# Patient Record
Sex: FEMALE | Race: WHITE | NOT HISPANIC OR LATINO | Employment: UNEMPLOYED | URBAN - METROPOLITAN AREA
[De-identification: names, ages, dates, MRNs, and addresses within clinical notes are randomized per-mention and may not be internally consistent; named-entity substitution may affect disease eponyms.]

---

## 2017-01-05 ENCOUNTER — AMBULATORY - HEALTHEAST (OUTPATIENT)
Dept: LAB | Facility: CLINIC | Age: 42
End: 2017-01-05

## 2017-01-05 ENCOUNTER — AMBULATORY - HEALTHEAST (OUTPATIENT)
Dept: BEHAVIORAL HEALTH | Facility: CLINIC | Age: 42
End: 2017-01-05

## 2017-01-05 ENCOUNTER — COMMUNICATION - HEALTHEAST (OUTPATIENT)
Dept: BEHAVIORAL HEALTH | Facility: CLINIC | Age: 42
End: 2017-01-05

## 2017-01-05 ENCOUNTER — OFFICE VISIT - HEALTHEAST (OUTPATIENT)
Dept: BEHAVIORAL HEALTH | Facility: CLINIC | Age: 42
End: 2017-01-05

## 2017-01-05 DIAGNOSIS — F43.10 PTSD (POST-TRAUMATIC STRESS DISORDER): ICD-10-CM

## 2017-01-05 DIAGNOSIS — F10.20 ALCOHOL DEPENDENCE, CONTINUOUS (H): ICD-10-CM

## 2017-01-05 DIAGNOSIS — F41.1 GAD (GENERALIZED ANXIETY DISORDER): ICD-10-CM

## 2017-01-05 DIAGNOSIS — F14.20 COCAINE DEPENDENCE, CONTINUOUS (H): ICD-10-CM

## 2017-01-05 DIAGNOSIS — F31.31 BIPOLAR 1 DISORDER, DEPRESSED, MILD (H): ICD-10-CM

## 2017-01-05 DIAGNOSIS — Z79.899 ENCOUNTER FOR LONG-TERM (CURRENT) USE OF MEDICATIONS: ICD-10-CM

## 2017-01-05 DIAGNOSIS — F10.21 ALCOHOL DEPENDENCE IN REMISSION (H): ICD-10-CM

## 2017-01-05 DIAGNOSIS — F14.21: ICD-10-CM

## 2017-01-09 ENCOUNTER — AMBULATORY - HEALTHEAST (OUTPATIENT)
Dept: LAB | Facility: CLINIC | Age: 42
End: 2017-01-09

## 2017-01-09 ENCOUNTER — COMMUNICATION - HEALTHEAST (OUTPATIENT)
Dept: BEHAVIORAL HEALTH | Facility: CLINIC | Age: 42
End: 2017-01-09

## 2017-01-09 DIAGNOSIS — F10.21 ALCOHOL DEPENDENCE IN REMISSION (H): ICD-10-CM

## 2017-01-09 DIAGNOSIS — F14.21: ICD-10-CM

## 2017-01-11 ENCOUNTER — OFFICE VISIT - HEALTHEAST (OUTPATIENT)
Dept: ADDICTION MEDICINE | Facility: CLINIC | Age: 42
End: 2017-01-11

## 2017-01-11 DIAGNOSIS — F14.20 COCAINE USE DISORDER, SEVERE, DEPENDENCE (H): ICD-10-CM

## 2017-01-16 ENCOUNTER — COMMUNICATION - HEALTHEAST (OUTPATIENT)
Dept: BEHAVIORAL HEALTH | Facility: CLINIC | Age: 42
End: 2017-01-16

## 2017-01-16 ENCOUNTER — OFFICE VISIT - HEALTHEAST (OUTPATIENT)
Dept: BEHAVIORAL HEALTH | Facility: CLINIC | Age: 42
End: 2017-01-16

## 2017-01-16 DIAGNOSIS — F10.21 ALCOHOL DEPENDENCE IN REMISSION (H): ICD-10-CM

## 2017-01-16 DIAGNOSIS — F43.10 PTSD (POST-TRAUMATIC STRESS DISORDER): ICD-10-CM

## 2017-01-16 DIAGNOSIS — F14.21: ICD-10-CM

## 2017-01-16 DIAGNOSIS — F41.1 GAD (GENERALIZED ANXIETY DISORDER): ICD-10-CM

## 2017-01-16 DIAGNOSIS — F31.31 BIPOLAR 1 DISORDER, DEPRESSED, MILD (H): ICD-10-CM

## 2017-01-18 ENCOUNTER — COMMUNICATION - HEALTHEAST (OUTPATIENT)
Dept: ADDICTION MEDICINE | Facility: CLINIC | Age: 42
End: 2017-01-18

## 2017-01-19 ENCOUNTER — OFFICE VISIT - HEALTHEAST (OUTPATIENT)
Dept: BEHAVIORAL HEALTH | Facility: CLINIC | Age: 42
End: 2017-01-19

## 2017-01-19 DIAGNOSIS — F31.31 BIPOLAR 1 DISORDER, DEPRESSED, MILD (H): ICD-10-CM

## 2017-01-19 DIAGNOSIS — F14.21: ICD-10-CM

## 2017-01-19 DIAGNOSIS — F43.10 PTSD (POST-TRAUMATIC STRESS DISORDER): ICD-10-CM

## 2017-01-23 ENCOUNTER — OFFICE VISIT - HEALTHEAST (OUTPATIENT)
Dept: BEHAVIORAL HEALTH | Facility: CLINIC | Age: 42
End: 2017-01-23

## 2017-01-23 ENCOUNTER — COMMUNICATION - HEALTHEAST (OUTPATIENT)
Dept: BEHAVIORAL HEALTH | Facility: CLINIC | Age: 42
End: 2017-01-23

## 2017-01-23 ENCOUNTER — AMBULATORY - HEALTHEAST (OUTPATIENT)
Dept: LAB | Facility: CLINIC | Age: 42
End: 2017-01-23

## 2017-01-23 DIAGNOSIS — F43.10 PTSD (POST-TRAUMATIC STRESS DISORDER): ICD-10-CM

## 2017-01-23 DIAGNOSIS — F31.31 BIPOLAR 1 DISORDER, DEPRESSED, MILD (H): ICD-10-CM

## 2017-01-23 DIAGNOSIS — F10.21 ALCOHOL DEPENDENCE IN REMISSION (H): ICD-10-CM

## 2017-01-23 DIAGNOSIS — F41.1 GAD (GENERALIZED ANXIETY DISORDER): ICD-10-CM

## 2017-01-23 DIAGNOSIS — F14.21: ICD-10-CM

## 2017-01-24 ENCOUNTER — OFFICE VISIT - HEALTHEAST (OUTPATIENT)
Dept: ADDICTION MEDICINE | Facility: CLINIC | Age: 42
End: 2017-01-24

## 2017-01-24 DIAGNOSIS — F14.20 COCAINE USE DISORDER, SEVERE, DEPENDENCE (H): ICD-10-CM

## 2017-01-25 ENCOUNTER — OFFICE VISIT - HEALTHEAST (OUTPATIENT)
Dept: ADDICTION MEDICINE | Facility: CLINIC | Age: 42
End: 2017-01-25

## 2017-01-25 ENCOUNTER — COMMUNICATION - HEALTHEAST (OUTPATIENT)
Dept: BEHAVIORAL HEALTH | Facility: CLINIC | Age: 42
End: 2017-01-25

## 2017-01-25 DIAGNOSIS — F14.20 COCAINE USE DISORDER, SEVERE, DEPENDENCE (H): ICD-10-CM

## 2017-01-27 ENCOUNTER — OFFICE VISIT - HEALTHEAST (OUTPATIENT)
Dept: ADDICTION MEDICINE | Facility: CLINIC | Age: 42
End: 2017-01-27

## 2017-01-27 ENCOUNTER — AMBULATORY - HEALTHEAST (OUTPATIENT)
Dept: ADDICTION MEDICINE | Facility: CLINIC | Age: 42
End: 2017-01-27

## 2017-01-27 DIAGNOSIS — F14.20 COCAINE USE DISORDER, SEVERE, DEPENDENCE (H): ICD-10-CM

## 2017-01-30 ENCOUNTER — AMBULATORY - HEALTHEAST (OUTPATIENT)
Dept: LAB | Facility: CLINIC | Age: 42
End: 2017-01-30

## 2017-01-30 ENCOUNTER — OFFICE VISIT - HEALTHEAST (OUTPATIENT)
Dept: ADDICTION MEDICINE | Facility: CLINIC | Age: 42
End: 2017-01-30

## 2017-01-30 DIAGNOSIS — F14.20 COCAINE USE DISORDER, SEVERE, DEPENDENCE (H): ICD-10-CM

## 2017-01-30 DIAGNOSIS — F10.21 ALCOHOL DEPENDENCE IN REMISSION (H): ICD-10-CM

## 2017-01-30 DIAGNOSIS — F14.21: ICD-10-CM

## 2017-01-31 ENCOUNTER — COMMUNICATION - HEALTHEAST (OUTPATIENT)
Dept: BEHAVIORAL HEALTH | Facility: CLINIC | Age: 42
End: 2017-01-31

## 2017-02-01 ENCOUNTER — OFFICE VISIT - HEALTHEAST (OUTPATIENT)
Dept: ADDICTION MEDICINE | Facility: CLINIC | Age: 42
End: 2017-02-01

## 2017-02-01 DIAGNOSIS — F14.20 COCAINE USE DISORDER, SEVERE, DEPENDENCE (H): ICD-10-CM

## 2017-02-02 ENCOUNTER — AMBULATORY - HEALTHEAST (OUTPATIENT)
Dept: ADDICTION MEDICINE | Facility: CLINIC | Age: 42
End: 2017-02-02

## 2017-02-02 ENCOUNTER — OFFICE VISIT - HEALTHEAST (OUTPATIENT)
Dept: BEHAVIORAL HEALTH | Facility: CLINIC | Age: 42
End: 2017-02-02

## 2017-02-02 DIAGNOSIS — F41.1 GAD (GENERALIZED ANXIETY DISORDER): ICD-10-CM

## 2017-02-02 DIAGNOSIS — F14.21: ICD-10-CM

## 2017-02-02 DIAGNOSIS — F31.31 BIPOLAR 1 DISORDER, DEPRESSED, MILD (H): ICD-10-CM

## 2017-02-02 DIAGNOSIS — F43.10 PTSD (POST-TRAUMATIC STRESS DISORDER): ICD-10-CM

## 2017-02-03 ENCOUNTER — AMBULATORY - HEALTHEAST (OUTPATIENT)
Dept: ADDICTION MEDICINE | Facility: CLINIC | Age: 42
End: 2017-02-03

## 2017-02-03 ENCOUNTER — OFFICE VISIT - HEALTHEAST (OUTPATIENT)
Dept: ADDICTION MEDICINE | Facility: CLINIC | Age: 42
End: 2017-02-03

## 2017-02-03 DIAGNOSIS — F14.20 COCAINE USE DISORDER, SEVERE, DEPENDENCE (H): ICD-10-CM

## 2017-02-06 ENCOUNTER — COMMUNICATION - HEALTHEAST (OUTPATIENT)
Dept: BEHAVIORAL HEALTH | Facility: CLINIC | Age: 42
End: 2017-02-06

## 2017-02-06 ENCOUNTER — OFFICE VISIT - HEALTHEAST (OUTPATIENT)
Dept: ADDICTION MEDICINE | Facility: CLINIC | Age: 42
End: 2017-02-06

## 2017-02-06 ENCOUNTER — OFFICE VISIT - HEALTHEAST (OUTPATIENT)
Dept: BEHAVIORAL HEALTH | Facility: CLINIC | Age: 42
End: 2017-02-06

## 2017-02-06 ENCOUNTER — AMBULATORY - HEALTHEAST (OUTPATIENT)
Dept: LAB | Facility: CLINIC | Age: 42
End: 2017-02-06

## 2017-02-06 DIAGNOSIS — F14.21: ICD-10-CM

## 2017-02-06 DIAGNOSIS — F10.21 ALCOHOL DEPENDENCE IN REMISSION (H): ICD-10-CM

## 2017-02-06 DIAGNOSIS — F43.10 PTSD (POST-TRAUMATIC STRESS DISORDER): ICD-10-CM

## 2017-02-06 DIAGNOSIS — F14.20 COCAINE USE DISORDER, SEVERE, DEPENDENCE (H): ICD-10-CM

## 2017-02-06 DIAGNOSIS — F41.1 GAD (GENERALIZED ANXIETY DISORDER): ICD-10-CM

## 2017-02-06 DIAGNOSIS — F31.31 BIPOLAR 1 DISORDER, DEPRESSED, MILD (H): ICD-10-CM

## 2017-02-10 ENCOUNTER — OFFICE VISIT - HEALTHEAST (OUTPATIENT)
Dept: ADDICTION MEDICINE | Facility: CLINIC | Age: 42
End: 2017-02-10

## 2017-02-10 DIAGNOSIS — F14.20 COCAINE USE DISORDER, SEVERE, DEPENDENCE (H): ICD-10-CM

## 2017-02-13 ENCOUNTER — OFFICE VISIT - HEALTHEAST (OUTPATIENT)
Dept: ADDICTION MEDICINE | Facility: CLINIC | Age: 42
End: 2017-02-13

## 2017-02-13 ENCOUNTER — OFFICE VISIT - HEALTHEAST (OUTPATIENT)
Dept: BEHAVIORAL HEALTH | Facility: CLINIC | Age: 42
End: 2017-02-13

## 2017-02-13 ENCOUNTER — AMBULATORY - HEALTHEAST (OUTPATIENT)
Dept: LAB | Facility: CLINIC | Age: 42
End: 2017-02-13

## 2017-02-13 DIAGNOSIS — F10.21 ALCOHOL DEPENDENCE IN REMISSION (H): ICD-10-CM

## 2017-02-13 DIAGNOSIS — F14.21: ICD-10-CM

## 2017-02-13 DIAGNOSIS — F43.10 PTSD (POST-TRAUMATIC STRESS DISORDER): ICD-10-CM

## 2017-02-13 DIAGNOSIS — F14.20 COCAINE USE DISORDER, SEVERE, DEPENDENCE (H): ICD-10-CM

## 2017-02-13 DIAGNOSIS — F41.1 GAD (GENERALIZED ANXIETY DISORDER): ICD-10-CM

## 2017-02-13 DIAGNOSIS — F31.31 BIPOLAR 1 DISORDER, DEPRESSED, MILD (H): ICD-10-CM

## 2017-02-14 ENCOUNTER — COMMUNICATION - HEALTHEAST (OUTPATIENT)
Dept: BEHAVIORAL HEALTH | Facility: CLINIC | Age: 42
End: 2017-02-14

## 2017-02-14 ENCOUNTER — OFFICE VISIT - HEALTHEAST (OUTPATIENT)
Dept: ADDICTION MEDICINE | Facility: CLINIC | Age: 42
End: 2017-02-14

## 2017-02-14 DIAGNOSIS — F14.20 COCAINE USE DISORDER, SEVERE, DEPENDENCE (H): ICD-10-CM

## 2017-02-15 ENCOUNTER — OFFICE VISIT - HEALTHEAST (OUTPATIENT)
Dept: ADDICTION MEDICINE | Facility: CLINIC | Age: 42
End: 2017-02-15

## 2017-02-15 DIAGNOSIS — F14.20 COCAINE USE DISORDER, SEVERE, DEPENDENCE (H): ICD-10-CM

## 2017-02-16 ENCOUNTER — OFFICE VISIT - HEALTHEAST (OUTPATIENT)
Dept: BEHAVIORAL HEALTH | Facility: CLINIC | Age: 42
End: 2017-02-16

## 2017-02-16 ENCOUNTER — AMBULATORY - HEALTHEAST (OUTPATIENT)
Dept: ADDICTION MEDICINE | Facility: CLINIC | Age: 42
End: 2017-02-16

## 2017-02-16 DIAGNOSIS — F14.21: ICD-10-CM

## 2017-02-16 DIAGNOSIS — F41.1 GAD (GENERALIZED ANXIETY DISORDER): ICD-10-CM

## 2017-02-16 DIAGNOSIS — F43.10 PTSD (POST-TRAUMATIC STRESS DISORDER): ICD-10-CM

## 2017-02-16 DIAGNOSIS — F31.31 BIPOLAR 1 DISORDER, DEPRESSED, MILD (H): ICD-10-CM

## 2017-02-17 ENCOUNTER — OFFICE VISIT - HEALTHEAST (OUTPATIENT)
Dept: ADDICTION MEDICINE | Facility: CLINIC | Age: 42
End: 2017-02-17

## 2017-02-17 DIAGNOSIS — F14.20 COCAINE USE DISORDER, SEVERE, DEPENDENCE (H): ICD-10-CM

## 2017-02-20 ENCOUNTER — AMBULATORY - HEALTHEAST (OUTPATIENT)
Dept: LAB | Facility: CLINIC | Age: 42
End: 2017-02-20

## 2017-02-20 ENCOUNTER — OFFICE VISIT - HEALTHEAST (OUTPATIENT)
Dept: ADDICTION MEDICINE | Facility: CLINIC | Age: 42
End: 2017-02-20

## 2017-02-20 DIAGNOSIS — F14.20 COCAINE USE DISORDER, SEVERE, DEPENDENCE (H): ICD-10-CM

## 2017-02-20 DIAGNOSIS — F14.21: ICD-10-CM

## 2017-02-20 DIAGNOSIS — F10.21 ALCOHOL DEPENDENCE IN REMISSION (H): ICD-10-CM

## 2017-02-21 ENCOUNTER — COMMUNICATION - HEALTHEAST (OUTPATIENT)
Dept: BEHAVIORAL HEALTH | Facility: CLINIC | Age: 42
End: 2017-02-21

## 2017-02-21 ENCOUNTER — OFFICE VISIT - HEALTHEAST (OUTPATIENT)
Dept: ADDICTION MEDICINE | Facility: CLINIC | Age: 42
End: 2017-02-21

## 2017-02-21 DIAGNOSIS — F14.20 COCAINE USE DISORDER, SEVERE, DEPENDENCE (H): ICD-10-CM

## 2017-02-22 ENCOUNTER — OFFICE VISIT - HEALTHEAST (OUTPATIENT)
Dept: ADDICTION MEDICINE | Facility: CLINIC | Age: 42
End: 2017-02-22

## 2017-02-22 ENCOUNTER — AMBULATORY - HEALTHEAST (OUTPATIENT)
Dept: ADDICTION MEDICINE | Facility: CLINIC | Age: 42
End: 2017-02-22

## 2017-02-22 DIAGNOSIS — F14.20 COCAINE USE DISORDER, SEVERE, DEPENDENCE (H): ICD-10-CM

## 2017-02-24 ENCOUNTER — OFFICE VISIT - HEALTHEAST (OUTPATIENT)
Dept: ADDICTION MEDICINE | Facility: CLINIC | Age: 42
End: 2017-02-24

## 2017-02-24 DIAGNOSIS — F14.20 COCAINE USE DISORDER, SEVERE, DEPENDENCE (H): ICD-10-CM

## 2017-02-27 ENCOUNTER — AMBULATORY - HEALTHEAST (OUTPATIENT)
Dept: LAB | Facility: CLINIC | Age: 42
End: 2017-02-27

## 2017-02-27 ENCOUNTER — COMMUNICATION - HEALTHEAST (OUTPATIENT)
Dept: BEHAVIORAL HEALTH | Facility: CLINIC | Age: 42
End: 2017-02-27

## 2017-02-27 DIAGNOSIS — F14.21: ICD-10-CM

## 2017-02-27 DIAGNOSIS — F10.21 ALCOHOL DEPENDENCE IN REMISSION (H): ICD-10-CM

## 2017-02-28 ENCOUNTER — OFFICE VISIT - HEALTHEAST (OUTPATIENT)
Dept: ADDICTION MEDICINE | Facility: CLINIC | Age: 42
End: 2017-02-28

## 2017-02-28 ENCOUNTER — COMMUNICATION - HEALTHEAST (OUTPATIENT)
Dept: BEHAVIORAL HEALTH | Facility: CLINIC | Age: 42
End: 2017-02-28

## 2017-02-28 DIAGNOSIS — F14.20 COCAINE USE DISORDER, SEVERE, DEPENDENCE (H): ICD-10-CM

## 2017-03-01 ENCOUNTER — OFFICE VISIT - HEALTHEAST (OUTPATIENT)
Dept: ADDICTION MEDICINE | Facility: CLINIC | Age: 42
End: 2017-03-01

## 2017-03-01 DIAGNOSIS — F14.20 COCAINE USE DISORDER, SEVERE, DEPENDENCE (H): ICD-10-CM

## 2017-03-03 ENCOUNTER — OFFICE VISIT - HEALTHEAST (OUTPATIENT)
Dept: ADDICTION MEDICINE | Facility: CLINIC | Age: 42
End: 2017-03-03

## 2017-03-03 DIAGNOSIS — F14.20 COCAINE USE DISORDER, SEVERE, DEPENDENCE (H): ICD-10-CM

## 2017-03-06 ENCOUNTER — OFFICE VISIT - HEALTHEAST (OUTPATIENT)
Dept: ADDICTION MEDICINE | Facility: CLINIC | Age: 42
End: 2017-03-06

## 2017-03-06 DIAGNOSIS — F14.20 COCAINE USE DISORDER, SEVERE, DEPENDENCE (H): ICD-10-CM

## 2017-03-07 ENCOUNTER — AMBULATORY - HEALTHEAST (OUTPATIENT)
Dept: ADDICTION MEDICINE | Facility: CLINIC | Age: 42
End: 2017-03-07

## 2017-03-07 ENCOUNTER — OFFICE VISIT - HEALTHEAST (OUTPATIENT)
Dept: ADDICTION MEDICINE | Facility: CLINIC | Age: 42
End: 2017-03-07

## 2017-03-07 ENCOUNTER — OFFICE VISIT - HEALTHEAST (OUTPATIENT)
Dept: BEHAVIORAL HEALTH | Facility: CLINIC | Age: 42
End: 2017-03-07

## 2017-03-07 DIAGNOSIS — F14.20 COCAINE USE DISORDER, SEVERE, DEPENDENCE (H): ICD-10-CM

## 2017-03-07 DIAGNOSIS — F10.21 ALCOHOL DEPENDENCE IN REMISSION (H): ICD-10-CM

## 2017-03-07 DIAGNOSIS — F31.32 MODERATE DEPRESSED BIPOLAR I DISORDER (H): ICD-10-CM

## 2017-03-07 DIAGNOSIS — F14.21 COCAINE DEPENDENCE, IN REMISSION (H): ICD-10-CM

## 2017-03-07 DIAGNOSIS — F43.10 PTSD (POST-TRAUMATIC STRESS DISORDER): ICD-10-CM

## 2017-03-07 DIAGNOSIS — F12.20 MARIJUANA DEPENDENCE (H): ICD-10-CM

## 2017-03-07 DIAGNOSIS — F31.31 MILD DEPRESSED BIPOLAR I DISORDER (H): ICD-10-CM

## 2017-03-07 DIAGNOSIS — F17.200 NICOTINE ADDICTION: ICD-10-CM

## 2017-03-07 DIAGNOSIS — F41.1 GAD (GENERALIZED ANXIETY DISORDER): ICD-10-CM

## 2017-03-07 ASSESSMENT — MIFFLIN-ST. JEOR: SCORE: 1448.75

## 2017-03-08 ENCOUNTER — OFFICE VISIT - HEALTHEAST (OUTPATIENT)
Dept: ADDICTION MEDICINE | Facility: CLINIC | Age: 42
End: 2017-03-08

## 2017-03-08 DIAGNOSIS — F14.20 COCAINE USE DISORDER, SEVERE, DEPENDENCE (H): ICD-10-CM

## 2017-03-10 ENCOUNTER — OFFICE VISIT - HEALTHEAST (OUTPATIENT)
Dept: ADDICTION MEDICINE | Facility: CLINIC | Age: 42
End: 2017-03-10

## 2017-03-10 DIAGNOSIS — F14.20 COCAINE USE DISORDER, SEVERE, DEPENDENCE (H): ICD-10-CM

## 2017-03-15 ENCOUNTER — OFFICE VISIT - HEALTHEAST (OUTPATIENT)
Dept: ADDICTION MEDICINE | Facility: CLINIC | Age: 42
End: 2017-03-15

## 2017-03-15 DIAGNOSIS — F14.20 COCAINE USE DISORDER, SEVERE, DEPENDENCE (H): ICD-10-CM

## 2017-03-16 ENCOUNTER — AMBULATORY - HEALTHEAST (OUTPATIENT)
Dept: LAB | Facility: CLINIC | Age: 42
End: 2017-03-16

## 2017-03-16 DIAGNOSIS — F10.21 ALCOHOL DEPENDENCE IN REMISSION (H): ICD-10-CM

## 2017-03-16 DIAGNOSIS — F14.21: ICD-10-CM

## 2017-03-17 ENCOUNTER — COMMUNICATION - HEALTHEAST (OUTPATIENT)
Dept: BEHAVIORAL HEALTH | Facility: CLINIC | Age: 42
End: 2017-03-17

## 2017-03-17 ENCOUNTER — OFFICE VISIT - HEALTHEAST (OUTPATIENT)
Dept: ADDICTION MEDICINE | Facility: CLINIC | Age: 42
End: 2017-03-17

## 2017-03-17 DIAGNOSIS — F14.20 COCAINE USE DISORDER, SEVERE, DEPENDENCE (H): ICD-10-CM

## 2017-03-20 ENCOUNTER — AMBULATORY - HEALTHEAST (OUTPATIENT)
Dept: ADDICTION MEDICINE | Facility: CLINIC | Age: 42
End: 2017-03-20

## 2017-03-22 ENCOUNTER — OFFICE VISIT - HEALTHEAST (OUTPATIENT)
Dept: ADDICTION MEDICINE | Facility: CLINIC | Age: 42
End: 2017-03-22

## 2017-03-22 DIAGNOSIS — F14.20 COCAINE USE DISORDER, SEVERE, DEPENDENCE (H): ICD-10-CM

## 2017-03-23 ENCOUNTER — OFFICE VISIT - HEALTHEAST (OUTPATIENT)
Dept: BEHAVIORAL HEALTH | Facility: CLINIC | Age: 42
End: 2017-03-23

## 2017-03-23 DIAGNOSIS — F31.31 MILD DEPRESSED BIPOLAR I DISORDER (H): ICD-10-CM

## 2017-03-23 DIAGNOSIS — F14.21 COCAINE DEPENDENCE, IN REMISSION (H): ICD-10-CM

## 2017-03-23 DIAGNOSIS — F43.10 PTSD (POST-TRAUMATIC STRESS DISORDER): ICD-10-CM

## 2017-03-23 DIAGNOSIS — F41.1 GAD (GENERALIZED ANXIETY DISORDER): ICD-10-CM

## 2017-03-24 ENCOUNTER — OFFICE VISIT - HEALTHEAST (OUTPATIENT)
Dept: ADDICTION MEDICINE | Facility: CLINIC | Age: 42
End: 2017-03-24

## 2017-03-24 DIAGNOSIS — F14.20 COCAINE USE DISORDER, SEVERE, DEPENDENCE (H): ICD-10-CM

## 2017-03-29 ENCOUNTER — OFFICE VISIT - HEALTHEAST (OUTPATIENT)
Dept: ADDICTION MEDICINE | Facility: CLINIC | Age: 42
End: 2017-03-29

## 2017-03-29 DIAGNOSIS — F14.20 COCAINE USE DISORDER, SEVERE, DEPENDENCE (H): ICD-10-CM

## 2017-03-30 ENCOUNTER — AMBULATORY - HEALTHEAST (OUTPATIENT)
Dept: ADDICTION MEDICINE | Facility: CLINIC | Age: 42
End: 2017-03-30

## 2017-03-31 ENCOUNTER — OFFICE VISIT - HEALTHEAST (OUTPATIENT)
Dept: ADDICTION MEDICINE | Facility: CLINIC | Age: 42
End: 2017-03-31

## 2017-03-31 DIAGNOSIS — F14.20 COCAINE USE DISORDER, SEVERE, DEPENDENCE (H): ICD-10-CM

## 2017-04-05 ENCOUNTER — OFFICE VISIT - HEALTHEAST (OUTPATIENT)
Dept: ADDICTION MEDICINE | Facility: CLINIC | Age: 42
End: 2017-04-05

## 2017-04-05 DIAGNOSIS — F14.20 COCAINE USE DISORDER, SEVERE, DEPENDENCE (H): ICD-10-CM

## 2017-04-07 ENCOUNTER — OFFICE VISIT - HEALTHEAST (OUTPATIENT)
Dept: ADDICTION MEDICINE | Facility: CLINIC | Age: 42
End: 2017-04-07

## 2017-04-07 DIAGNOSIS — F14.20 COCAINE USE DISORDER, SEVERE, DEPENDENCE (H): ICD-10-CM

## 2017-04-10 ENCOUNTER — AMBULATORY - HEALTHEAST (OUTPATIENT)
Dept: ADDICTION MEDICINE | Facility: CLINIC | Age: 42
End: 2017-04-10

## 2017-04-12 ENCOUNTER — OFFICE VISIT - HEALTHEAST (OUTPATIENT)
Dept: ADDICTION MEDICINE | Facility: CLINIC | Age: 42
End: 2017-04-12

## 2017-04-12 DIAGNOSIS — F14.20 COCAINE USE DISORDER, SEVERE, DEPENDENCE (H): ICD-10-CM

## 2017-04-13 ENCOUNTER — OFFICE VISIT - HEALTHEAST (OUTPATIENT)
Dept: BEHAVIORAL HEALTH | Facility: CLINIC | Age: 42
End: 2017-04-13

## 2017-04-13 DIAGNOSIS — F31.31 MILD DEPRESSED BIPOLAR I DISORDER (H): ICD-10-CM

## 2017-04-13 DIAGNOSIS — F14.21 COCAINE DEPENDENCE, IN REMISSION (H): ICD-10-CM

## 2017-04-13 DIAGNOSIS — F41.1 GAD (GENERALIZED ANXIETY DISORDER): ICD-10-CM

## 2017-04-13 DIAGNOSIS — F43.10 PTSD (POST-TRAUMATIC STRESS DISORDER): ICD-10-CM

## 2017-04-14 ENCOUNTER — OFFICE VISIT - HEALTHEAST (OUTPATIENT)
Dept: ADDICTION MEDICINE | Facility: CLINIC | Age: 42
End: 2017-04-14

## 2017-04-14 DIAGNOSIS — F14.20 COCAINE USE DISORDER, SEVERE, DEPENDENCE (H): ICD-10-CM

## 2017-04-19 ENCOUNTER — OFFICE VISIT - HEALTHEAST (OUTPATIENT)
Dept: ADDICTION MEDICINE | Facility: CLINIC | Age: 42
End: 2017-04-19

## 2017-04-19 ENCOUNTER — AMBULATORY - HEALTHEAST (OUTPATIENT)
Dept: ADDICTION MEDICINE | Facility: CLINIC | Age: 42
End: 2017-04-19

## 2017-04-19 DIAGNOSIS — F14.20 COCAINE USE DISORDER, SEVERE, DEPENDENCE (H): ICD-10-CM

## 2017-04-21 ENCOUNTER — OFFICE VISIT - HEALTHEAST (OUTPATIENT)
Dept: ADDICTION MEDICINE | Facility: CLINIC | Age: 42
End: 2017-04-21

## 2017-04-21 DIAGNOSIS — F14.20 COCAINE USE DISORDER, SEVERE, DEPENDENCE (H): ICD-10-CM

## 2017-04-24 ENCOUNTER — AMBULATORY - HEALTHEAST (OUTPATIENT)
Dept: ADDICTION MEDICINE | Facility: CLINIC | Age: 42
End: 2017-04-24

## 2017-04-25 ENCOUNTER — OFFICE VISIT - HEALTHEAST (OUTPATIENT)
Dept: ADDICTION MEDICINE | Facility: CLINIC | Age: 42
End: 2017-04-25

## 2017-04-25 DIAGNOSIS — F14.20 COCAINE USE DISORDER, SEVERE, DEPENDENCE (H): ICD-10-CM

## 2017-04-26 ENCOUNTER — COMMUNICATION - HEALTHEAST (OUTPATIENT)
Dept: BEHAVIORAL HEALTH | Facility: CLINIC | Age: 42
End: 2017-04-26

## 2017-04-26 ENCOUNTER — AMBULATORY - HEALTHEAST (OUTPATIENT)
Dept: ADDICTION MEDICINE | Facility: CLINIC | Age: 42
End: 2017-04-26

## 2017-04-26 DIAGNOSIS — F31.32 MODERATE DEPRESSED BIPOLAR I DISORDER (H): ICD-10-CM

## 2017-04-27 ENCOUNTER — COMMUNICATION - HEALTHEAST (OUTPATIENT)
Dept: BEHAVIORAL HEALTH | Facility: CLINIC | Age: 42
End: 2017-04-27

## 2017-04-27 DIAGNOSIS — F31.32 MODERATE DEPRESSED BIPOLAR I DISORDER (H): ICD-10-CM

## 2017-04-28 ENCOUNTER — AMBULATORY - HEALTHEAST (OUTPATIENT)
Dept: ADDICTION MEDICINE | Facility: CLINIC | Age: 42
End: 2017-04-28

## 2017-05-02 ENCOUNTER — OFFICE VISIT - HEALTHEAST (OUTPATIENT)
Dept: ADDICTION MEDICINE | Facility: CLINIC | Age: 42
End: 2017-05-02

## 2017-05-02 DIAGNOSIS — F14.20 COCAINE USE DISORDER, SEVERE, DEPENDENCE (H): ICD-10-CM

## 2017-05-03 ENCOUNTER — AMBULATORY - HEALTHEAST (OUTPATIENT)
Dept: ADDICTION MEDICINE | Facility: CLINIC | Age: 42
End: 2017-05-03

## 2017-05-04 ENCOUNTER — OFFICE VISIT - HEALTHEAST (OUTPATIENT)
Dept: BEHAVIORAL HEALTH | Facility: CLINIC | Age: 42
End: 2017-05-04

## 2017-05-04 DIAGNOSIS — F14.21 COCAINE DEPENDENCE, IN REMISSION (H): ICD-10-CM

## 2017-05-04 DIAGNOSIS — F41.1 GAD (GENERALIZED ANXIETY DISORDER): ICD-10-CM

## 2017-05-04 DIAGNOSIS — F31.31 MILD DEPRESSED BIPOLAR I DISORDER (H): ICD-10-CM

## 2017-05-04 DIAGNOSIS — F43.10 PTSD (POST-TRAUMATIC STRESS DISORDER): ICD-10-CM

## 2017-05-05 ENCOUNTER — OFFICE VISIT - HEALTHEAST (OUTPATIENT)
Dept: ADDICTION MEDICINE | Facility: CLINIC | Age: 42
End: 2017-05-05

## 2017-05-05 DIAGNOSIS — F14.20 COCAINE USE DISORDER, SEVERE, DEPENDENCE (H): ICD-10-CM

## 2017-05-09 ENCOUNTER — OFFICE VISIT - HEALTHEAST (OUTPATIENT)
Dept: ADDICTION MEDICINE | Facility: CLINIC | Age: 42
End: 2017-05-09

## 2017-05-09 DIAGNOSIS — F14.20 COCAINE USE DISORDER, SEVERE, DEPENDENCE (H): ICD-10-CM

## 2017-05-10 ENCOUNTER — OFFICE VISIT - HEALTHEAST (OUTPATIENT)
Dept: BEHAVIORAL HEALTH | Facility: CLINIC | Age: 42
End: 2017-05-10

## 2017-05-10 ENCOUNTER — AMBULATORY - HEALTHEAST (OUTPATIENT)
Dept: ADDICTION MEDICINE | Facility: CLINIC | Age: 42
End: 2017-05-10

## 2017-05-10 DIAGNOSIS — F10.21 ALCOHOL DEPENDENCE IN REMISSION (H): ICD-10-CM

## 2017-05-10 DIAGNOSIS — F31.31 MILD DEPRESSED BIPOLAR I DISORDER (H): ICD-10-CM

## 2017-05-10 DIAGNOSIS — F41.1 GAD (GENERALIZED ANXIETY DISORDER): ICD-10-CM

## 2017-05-10 DIAGNOSIS — Z79.899 ENCOUNTER FOR LONG-TERM (CURRENT) USE OF HIGH-RISK MEDICATION: ICD-10-CM

## 2017-05-10 DIAGNOSIS — F43.10 PTSD (POST-TRAUMATIC STRESS DISORDER): ICD-10-CM

## 2017-05-10 DIAGNOSIS — F14.21 COCAINE DEPENDENCE, IN REMISSION (H): ICD-10-CM

## 2017-05-10 DIAGNOSIS — F17.200 NICOTINE ADDICTION: ICD-10-CM

## 2017-05-10 DIAGNOSIS — F12.11 MARIJUANA ABUSE IN REMISSION: ICD-10-CM

## 2017-05-10 ASSESSMENT — MIFFLIN-ST. JEOR: SCORE: 1485.03

## 2017-05-11 ENCOUNTER — OFFICE VISIT - HEALTHEAST (OUTPATIENT)
Dept: BEHAVIORAL HEALTH | Facility: CLINIC | Age: 42
End: 2017-05-11

## 2017-05-11 DIAGNOSIS — F31.31 MILD DEPRESSED BIPOLAR I DISORDER (H): ICD-10-CM

## 2017-05-11 DIAGNOSIS — F41.1 GAD (GENERALIZED ANXIETY DISORDER): ICD-10-CM

## 2017-05-11 DIAGNOSIS — F43.10 PTSD (POST-TRAUMATIC STRESS DISORDER): ICD-10-CM

## 2017-05-11 DIAGNOSIS — F14.21 COCAINE DEPENDENCE, IN REMISSION (H): ICD-10-CM

## 2017-05-14 ENCOUNTER — COMMUNICATION - HEALTHEAST (OUTPATIENT)
Dept: BEHAVIORAL HEALTH | Facility: CLINIC | Age: 42
End: 2017-05-14

## 2017-05-14 DIAGNOSIS — F14.21 COCAINE DEPENDENCE, IN REMISSION (H): ICD-10-CM

## 2017-05-16 ENCOUNTER — OFFICE VISIT - HEALTHEAST (OUTPATIENT)
Dept: ADDICTION MEDICINE | Facility: CLINIC | Age: 42
End: 2017-05-16

## 2017-05-16 DIAGNOSIS — F14.20 COCAINE USE DISORDER, SEVERE, DEPENDENCE (H): ICD-10-CM

## 2017-05-17 ENCOUNTER — AMBULATORY - HEALTHEAST (OUTPATIENT)
Dept: ADDICTION MEDICINE | Facility: CLINIC | Age: 42
End: 2017-05-17

## 2017-05-18 ENCOUNTER — OFFICE VISIT - HEALTHEAST (OUTPATIENT)
Dept: BEHAVIORAL HEALTH | Facility: CLINIC | Age: 42
End: 2017-05-18

## 2017-05-18 DIAGNOSIS — F43.10 PTSD (POST-TRAUMATIC STRESS DISORDER): ICD-10-CM

## 2017-05-18 DIAGNOSIS — F31.31 MILD DEPRESSED BIPOLAR I DISORDER (H): ICD-10-CM

## 2017-05-18 DIAGNOSIS — F41.1 GAD (GENERALIZED ANXIETY DISORDER): ICD-10-CM

## 2017-05-23 ENCOUNTER — OFFICE VISIT - HEALTHEAST (OUTPATIENT)
Dept: ADDICTION MEDICINE | Facility: CLINIC | Age: 42
End: 2017-05-23

## 2017-05-23 DIAGNOSIS — F14.20 COCAINE USE DISORDER, SEVERE, DEPENDENCE (H): ICD-10-CM

## 2017-05-24 ENCOUNTER — AMBULATORY - HEALTHEAST (OUTPATIENT)
Dept: ADDICTION MEDICINE | Facility: CLINIC | Age: 42
End: 2017-05-24

## 2017-05-25 ENCOUNTER — OFFICE VISIT - HEALTHEAST (OUTPATIENT)
Dept: BEHAVIORAL HEALTH | Facility: CLINIC | Age: 42
End: 2017-05-25

## 2017-05-25 DIAGNOSIS — F41.1 GAD (GENERALIZED ANXIETY DISORDER): ICD-10-CM

## 2017-05-25 DIAGNOSIS — F43.10 PTSD (POST-TRAUMATIC STRESS DISORDER): ICD-10-CM

## 2017-05-25 DIAGNOSIS — F12.11 MARIJUANA ABUSE IN REMISSION: ICD-10-CM

## 2017-05-25 DIAGNOSIS — F14.21 COCAINE DEPENDENCE, IN REMISSION (H): ICD-10-CM

## 2017-05-25 DIAGNOSIS — F31.31 MILD DEPRESSED BIPOLAR I DISORDER (H): ICD-10-CM

## 2017-05-25 DIAGNOSIS — F10.21 ALCOHOL DEPENDENCE IN REMISSION (H): ICD-10-CM

## 2017-05-30 ENCOUNTER — OFFICE VISIT - HEALTHEAST (OUTPATIENT)
Dept: BEHAVIORAL HEALTH | Facility: CLINIC | Age: 42
End: 2017-05-30

## 2017-05-30 ENCOUNTER — OFFICE VISIT - HEALTHEAST (OUTPATIENT)
Dept: ADDICTION MEDICINE | Facility: CLINIC | Age: 42
End: 2017-05-30

## 2017-05-30 DIAGNOSIS — F31.31 MILD DEPRESSED BIPOLAR I DISORDER (H): ICD-10-CM

## 2017-05-30 DIAGNOSIS — F10.21 ALCOHOL DEPENDENCE IN REMISSION (H): ICD-10-CM

## 2017-05-30 DIAGNOSIS — F12.20 MARIJUANA DEPENDENCE (H): ICD-10-CM

## 2017-05-30 DIAGNOSIS — F43.10 PTSD (POST-TRAUMATIC STRESS DISORDER): ICD-10-CM

## 2017-05-30 DIAGNOSIS — F31.9 BIPOLAR 1 DISORDER (H): ICD-10-CM

## 2017-05-30 DIAGNOSIS — F14.20 COCAINE USE DISORDER, SEVERE, DEPENDENCE (H): ICD-10-CM

## 2017-05-30 DIAGNOSIS — F41.1 GAD (GENERALIZED ANXIETY DISORDER): ICD-10-CM

## 2017-05-30 DIAGNOSIS — F17.200 NICOTINE ADDICTION: ICD-10-CM

## 2017-05-30 DIAGNOSIS — F14.21 COCAINE DEPENDENCE, IN REMISSION (H): ICD-10-CM

## 2017-05-30 DIAGNOSIS — F31.32 BIPOLAR 1 DISORDER, DEPRESSED, MODERATE (H): ICD-10-CM

## 2017-05-30 DIAGNOSIS — Z79.899 ENCOUNTER FOR LONG-TERM (CURRENT) USE OF HIGH-RISK MEDICATION: ICD-10-CM

## 2017-05-30 ASSESSMENT — MIFFLIN-ST. JEOR: SCORE: 1475.96

## 2017-05-31 ENCOUNTER — OFFICE VISIT - HEALTHEAST (OUTPATIENT)
Dept: BEHAVIORAL HEALTH | Facility: CLINIC | Age: 42
End: 2017-05-31

## 2017-05-31 ENCOUNTER — AMBULATORY - HEALTHEAST (OUTPATIENT)
Dept: ADDICTION MEDICINE | Facility: CLINIC | Age: 42
End: 2017-05-31

## 2017-05-31 DIAGNOSIS — F14.21 COCAINE DEPENDENCE, IN REMISSION (H): ICD-10-CM

## 2017-05-31 DIAGNOSIS — F41.1 GAD (GENERALIZED ANXIETY DISORDER): ICD-10-CM

## 2017-05-31 DIAGNOSIS — F31.31 MILD DEPRESSED BIPOLAR I DISORDER (H): ICD-10-CM

## 2017-05-31 DIAGNOSIS — F43.10 PTSD (POST-TRAUMATIC STRESS DISORDER): ICD-10-CM

## 2017-06-01 ENCOUNTER — OFFICE VISIT - HEALTHEAST (OUTPATIENT)
Dept: BEHAVIORAL HEALTH | Facility: CLINIC | Age: 42
End: 2017-06-01

## 2017-06-01 DIAGNOSIS — F31.31 MILD DEPRESSED BIPOLAR I DISORDER (H): ICD-10-CM

## 2017-06-01 DIAGNOSIS — F41.1 GAD (GENERALIZED ANXIETY DISORDER): ICD-10-CM

## 2017-06-01 DIAGNOSIS — F43.10 PTSD (POST-TRAUMATIC STRESS DISORDER): ICD-10-CM

## 2017-06-05 ENCOUNTER — AMBULATORY - HEALTHEAST (OUTPATIENT)
Dept: LAB | Facility: CLINIC | Age: 42
End: 2017-06-05

## 2017-06-05 DIAGNOSIS — Z79.899 ENCOUNTER FOR LONG-TERM (CURRENT) USE OF HIGH-RISK MEDICATION: ICD-10-CM

## 2017-06-06 ENCOUNTER — COMMUNICATION - HEALTHEAST (OUTPATIENT)
Dept: BEHAVIORAL HEALTH | Facility: CLINIC | Age: 42
End: 2017-06-06

## 2017-06-06 ENCOUNTER — OFFICE VISIT - HEALTHEAST (OUTPATIENT)
Dept: ADDICTION MEDICINE | Facility: CLINIC | Age: 42
End: 2017-06-06

## 2017-06-06 DIAGNOSIS — F14.20 COCAINE USE DISORDER, SEVERE, DEPENDENCE (H): ICD-10-CM

## 2017-06-06 DIAGNOSIS — F14.21 COCAINE DEPENDENCE, IN REMISSION (H): ICD-10-CM

## 2017-06-07 ENCOUNTER — AMBULATORY - HEALTHEAST (OUTPATIENT)
Dept: ADDICTION MEDICINE | Facility: CLINIC | Age: 42
End: 2017-06-07

## 2017-06-13 ENCOUNTER — OFFICE VISIT - HEALTHEAST (OUTPATIENT)
Dept: ADDICTION MEDICINE | Facility: CLINIC | Age: 42
End: 2017-06-13

## 2017-06-13 DIAGNOSIS — F14.20 COCAINE USE DISORDER, SEVERE, DEPENDENCE (H): ICD-10-CM

## 2017-06-14 ENCOUNTER — OFFICE VISIT - HEALTHEAST (OUTPATIENT)
Dept: BEHAVIORAL HEALTH | Facility: CLINIC | Age: 42
End: 2017-06-14

## 2017-06-14 ENCOUNTER — AMBULATORY - HEALTHEAST (OUTPATIENT)
Dept: ADDICTION MEDICINE | Facility: CLINIC | Age: 42
End: 2017-06-14

## 2017-06-14 DIAGNOSIS — F41.1 GAD (GENERALIZED ANXIETY DISORDER): ICD-10-CM

## 2017-06-14 DIAGNOSIS — F31.31 MILD DEPRESSED BIPOLAR I DISORDER (H): ICD-10-CM

## 2017-06-14 DIAGNOSIS — F43.10 PTSD (POST-TRAUMATIC STRESS DISORDER): ICD-10-CM

## 2017-06-14 DIAGNOSIS — F14.21 COCAINE DEPENDENCE, IN REMISSION (H): ICD-10-CM

## 2017-06-15 ENCOUNTER — OFFICE VISIT - HEALTHEAST (OUTPATIENT)
Dept: BEHAVIORAL HEALTH | Facility: CLINIC | Age: 42
End: 2017-06-15

## 2017-06-15 DIAGNOSIS — F41.1 GAD (GENERALIZED ANXIETY DISORDER): ICD-10-CM

## 2017-06-15 DIAGNOSIS — F31.31 MILD DEPRESSED BIPOLAR I DISORDER (H): ICD-10-CM

## 2017-06-15 DIAGNOSIS — F43.10 PTSD (POST-TRAUMATIC STRESS DISORDER): ICD-10-CM

## 2017-06-20 ENCOUNTER — OFFICE VISIT - HEALTHEAST (OUTPATIENT)
Dept: ADDICTION MEDICINE | Facility: CLINIC | Age: 42
End: 2017-06-20

## 2017-06-20 DIAGNOSIS — F14.20 COCAINE USE DISORDER, SEVERE, DEPENDENCE (H): ICD-10-CM

## 2017-06-21 ENCOUNTER — RECORDS - HEALTHEAST (OUTPATIENT)
Dept: LAB | Facility: CLINIC | Age: 42
End: 2017-06-21

## 2017-06-21 LAB — HIV 1+2 AB+HIV1 P24 AG SERPL QL IA: NEGATIVE

## 2017-06-22 ENCOUNTER — OFFICE VISIT - HEALTHEAST (OUTPATIENT)
Dept: BEHAVIORAL HEALTH | Facility: CLINIC | Age: 42
End: 2017-06-22

## 2017-06-22 DIAGNOSIS — F14.21 COCAINE DEPENDENCE, IN REMISSION (H): ICD-10-CM

## 2017-06-22 DIAGNOSIS — F41.1 GAD (GENERALIZED ANXIETY DISORDER): ICD-10-CM

## 2017-06-22 DIAGNOSIS — F31.31 MILD DEPRESSED BIPOLAR I DISORDER (H): ICD-10-CM

## 2017-06-22 DIAGNOSIS — F43.10 PTSD (POST-TRAUMATIC STRESS DISORDER): ICD-10-CM

## 2017-06-22 LAB — SYPHILIS RPR SCREEN - HISTORICAL: NORMAL

## 2017-06-23 ENCOUNTER — AMBULATORY - HEALTHEAST (OUTPATIENT)
Dept: ADDICTION MEDICINE | Facility: CLINIC | Age: 42
End: 2017-06-23

## 2017-06-26 ENCOUNTER — COMMUNICATION - HEALTHEAST (OUTPATIENT)
Dept: BEHAVIORAL HEALTH | Facility: CLINIC | Age: 42
End: 2017-06-26

## 2017-06-26 DIAGNOSIS — F43.10 PTSD (POST-TRAUMATIC STRESS DISORDER): ICD-10-CM

## 2017-06-27 ENCOUNTER — OFFICE VISIT - HEALTHEAST (OUTPATIENT)
Dept: ADDICTION MEDICINE | Facility: CLINIC | Age: 42
End: 2017-06-27

## 2017-06-27 ENCOUNTER — AMBULATORY - HEALTHEAST (OUTPATIENT)
Dept: ADDICTION MEDICINE | Facility: CLINIC | Age: 42
End: 2017-06-27

## 2017-06-27 DIAGNOSIS — F14.20 COCAINE USE DISORDER, SEVERE, DEPENDENCE (H): ICD-10-CM

## 2017-06-28 ENCOUNTER — OFFICE VISIT - HEALTHEAST (OUTPATIENT)
Dept: BEHAVIORAL HEALTH | Facility: CLINIC | Age: 42
End: 2017-06-28

## 2017-06-28 DIAGNOSIS — F31.9 BIPOLAR 1 DISORDER (H): ICD-10-CM

## 2017-06-28 DIAGNOSIS — F31.32 MODERATE DEPRESSED BIPOLAR I DISORDER (H): ICD-10-CM

## 2017-06-28 DIAGNOSIS — F41.1 GENERALIZED ANXIETY DISORDER: ICD-10-CM

## 2017-06-28 DIAGNOSIS — F43.21 ADJUSTMENT DISORDER WITH DEPRESSED MOOD: ICD-10-CM

## 2017-06-28 DIAGNOSIS — F14.21 COCAINE DEPENDENCE, IN REMISSION (H): ICD-10-CM

## 2017-06-28 DIAGNOSIS — F31.31 BIPOLAR 1 DISORDER, DEPRESSED, MILD (H): ICD-10-CM

## 2017-06-28 DIAGNOSIS — F43.10 PTSD (POST-TRAUMATIC STRESS DISORDER): ICD-10-CM

## 2017-06-28 ASSESSMENT — MIFFLIN-ST. JEOR: SCORE: 1475.96

## 2017-06-29 ENCOUNTER — OFFICE VISIT - HEALTHEAST (OUTPATIENT)
Dept: BEHAVIORAL HEALTH | Facility: CLINIC | Age: 42
End: 2017-06-29

## 2017-06-29 DIAGNOSIS — F43.10 PTSD (POST-TRAUMATIC STRESS DISORDER): ICD-10-CM

## 2017-06-29 DIAGNOSIS — F14.21 COCAINE DEPENDENCE, IN REMISSION (H): ICD-10-CM

## 2017-06-29 DIAGNOSIS — F41.1 GAD (GENERALIZED ANXIETY DISORDER): ICD-10-CM

## 2017-06-29 DIAGNOSIS — F31.31 BIPOLAR 1 DISORDER, DEPRESSED, MILD (H): ICD-10-CM

## 2017-07-06 ENCOUNTER — OFFICE VISIT - HEALTHEAST (OUTPATIENT)
Dept: BEHAVIORAL HEALTH | Facility: CLINIC | Age: 42
End: 2017-07-06

## 2017-07-06 DIAGNOSIS — F14.21 COCAINE DEPENDENCE, IN REMISSION (H): ICD-10-CM

## 2017-07-06 DIAGNOSIS — F43.10 PTSD (POST-TRAUMATIC STRESS DISORDER): ICD-10-CM

## 2017-07-06 DIAGNOSIS — F31.31 BIPOLAR 1 DISORDER, DEPRESSED, MILD (H): ICD-10-CM

## 2017-07-06 DIAGNOSIS — F41.1 GAD (GENERALIZED ANXIETY DISORDER): ICD-10-CM

## 2017-07-11 ENCOUNTER — OFFICE VISIT - HEALTHEAST (OUTPATIENT)
Dept: ADDICTION MEDICINE | Facility: CLINIC | Age: 42
End: 2017-07-11

## 2017-07-11 DIAGNOSIS — F14.20 COCAINE USE DISORDER, SEVERE, DEPENDENCE (H): ICD-10-CM

## 2017-07-12 ENCOUNTER — AMBULATORY - HEALTHEAST (OUTPATIENT)
Dept: ADDICTION MEDICINE | Facility: CLINIC | Age: 42
End: 2017-07-12

## 2017-07-13 ENCOUNTER — OFFICE VISIT - HEALTHEAST (OUTPATIENT)
Dept: BEHAVIORAL HEALTH | Facility: CLINIC | Age: 42
End: 2017-07-13

## 2017-07-13 DIAGNOSIS — F43.10 PTSD (POST-TRAUMATIC STRESS DISORDER): ICD-10-CM

## 2017-07-13 DIAGNOSIS — F31.31 BIPOLAR 1 DISORDER, DEPRESSED, MILD (H): ICD-10-CM

## 2017-07-13 DIAGNOSIS — F14.21 COCAINE DEPENDENCE, IN REMISSION (H): ICD-10-CM

## 2017-07-13 DIAGNOSIS — F41.1 GAD (GENERALIZED ANXIETY DISORDER): ICD-10-CM

## 2017-07-18 ENCOUNTER — OFFICE VISIT - HEALTHEAST (OUTPATIENT)
Dept: ADDICTION MEDICINE | Facility: CLINIC | Age: 42
End: 2017-07-18

## 2017-07-18 DIAGNOSIS — F14.20 COCAINE USE DISORDER, SEVERE, DEPENDENCE (H): ICD-10-CM

## 2017-07-19 ENCOUNTER — AMBULATORY - HEALTHEAST (OUTPATIENT)
Dept: ADDICTION MEDICINE | Facility: CLINIC | Age: 42
End: 2017-07-19

## 2017-07-20 ENCOUNTER — OFFICE VISIT - HEALTHEAST (OUTPATIENT)
Dept: BEHAVIORAL HEALTH | Facility: CLINIC | Age: 42
End: 2017-07-20

## 2017-07-20 DIAGNOSIS — F31.31 BIPOLAR 1 DISORDER, DEPRESSED, MILD (H): ICD-10-CM

## 2017-07-20 DIAGNOSIS — F41.1 GAD (GENERALIZED ANXIETY DISORDER): ICD-10-CM

## 2017-07-20 DIAGNOSIS — F43.10 PTSD (POST-TRAUMATIC STRESS DISORDER): ICD-10-CM

## 2017-07-20 DIAGNOSIS — F14.21 COCAINE DEPENDENCE, IN REMISSION (H): ICD-10-CM

## 2017-07-21 ENCOUNTER — AMBULATORY - HEALTHEAST (OUTPATIENT)
Dept: ADDICTION MEDICINE | Facility: CLINIC | Age: 42
End: 2017-07-21

## 2017-07-25 ENCOUNTER — OFFICE VISIT - HEALTHEAST (OUTPATIENT)
Dept: BEHAVIORAL HEALTH | Facility: CLINIC | Age: 42
End: 2017-07-25

## 2017-07-25 ENCOUNTER — OFFICE VISIT - HEALTHEAST (OUTPATIENT)
Dept: ADDICTION MEDICINE | Facility: CLINIC | Age: 42
End: 2017-07-25

## 2017-07-25 DIAGNOSIS — F14.21 COCAINE DEPENDENCE, IN REMISSION (H): ICD-10-CM

## 2017-07-25 DIAGNOSIS — F43.10 PTSD (POST-TRAUMATIC STRESS DISORDER): ICD-10-CM

## 2017-07-25 DIAGNOSIS — F31.31 BIPOLAR 1 DISORDER, DEPRESSED, MILD (H): ICD-10-CM

## 2017-07-25 DIAGNOSIS — F14.20 COCAINE USE DISORDER, SEVERE, DEPENDENCE (H): ICD-10-CM

## 2017-07-25 DIAGNOSIS — F41.1 GAD (GENERALIZED ANXIETY DISORDER): ICD-10-CM

## 2017-07-26 ENCOUNTER — AMBULATORY - HEALTHEAST (OUTPATIENT)
Dept: ADDICTION MEDICINE | Facility: CLINIC | Age: 42
End: 2017-07-26

## 2017-07-28 ENCOUNTER — OFFICE VISIT - HEALTHEAST (OUTPATIENT)
Dept: BEHAVIORAL HEALTH | Facility: CLINIC | Age: 42
End: 2017-07-28

## 2017-07-28 DIAGNOSIS — F31.31 BIPOLAR 1 DISORDER, DEPRESSED, MILD (H): ICD-10-CM

## 2017-07-28 DIAGNOSIS — F43.10 PTSD (POST-TRAUMATIC STRESS DISORDER): ICD-10-CM

## 2017-07-28 DIAGNOSIS — F14.21 COCAINE DEPENDENCE, IN REMISSION (H): ICD-10-CM

## 2017-07-28 DIAGNOSIS — F41.1 GAD (GENERALIZED ANXIETY DISORDER): ICD-10-CM

## 2017-07-31 ENCOUNTER — RECORDS - HEALTHEAST (OUTPATIENT)
Dept: ADMINISTRATIVE | Facility: OTHER | Age: 42
End: 2017-07-31

## 2017-08-01 ENCOUNTER — OFFICE VISIT - HEALTHEAST (OUTPATIENT)
Dept: ADDICTION MEDICINE | Facility: CLINIC | Age: 42
End: 2017-08-01

## 2017-08-01 DIAGNOSIS — F14.20 COCAINE USE DISORDER, SEVERE, DEPENDENCE (H): ICD-10-CM

## 2017-08-02 ENCOUNTER — AMBULATORY - HEALTHEAST (OUTPATIENT)
Dept: ADDICTION MEDICINE | Facility: CLINIC | Age: 42
End: 2017-08-02

## 2017-08-15 ENCOUNTER — OFFICE VISIT - HEALTHEAST (OUTPATIENT)
Dept: ADDICTION MEDICINE | Facility: CLINIC | Age: 42
End: 2017-08-15

## 2017-08-15 DIAGNOSIS — F14.20 COCAINE USE DISORDER, SEVERE, DEPENDENCE (H): ICD-10-CM

## 2017-08-16 ENCOUNTER — AMBULATORY - HEALTHEAST (OUTPATIENT)
Dept: ADDICTION MEDICINE | Facility: CLINIC | Age: 42
End: 2017-08-16

## 2017-08-22 ENCOUNTER — OFFICE VISIT - HEALTHEAST (OUTPATIENT)
Dept: ADDICTION MEDICINE | Facility: CLINIC | Age: 42
End: 2017-08-22

## 2017-08-22 DIAGNOSIS — F14.20 COCAINE USE DISORDER, SEVERE, DEPENDENCE (H): ICD-10-CM

## 2017-08-23 ENCOUNTER — AMBULATORY - HEALTHEAST (OUTPATIENT)
Dept: ADDICTION MEDICINE | Facility: CLINIC | Age: 42
End: 2017-08-23

## 2017-08-25 ENCOUNTER — OFFICE VISIT - HEALTHEAST (OUTPATIENT)
Dept: BEHAVIORAL HEALTH | Facility: CLINIC | Age: 42
End: 2017-08-25

## 2017-08-25 DIAGNOSIS — F31.31 BIPOLAR 1 DISORDER, DEPRESSED, MILD (H): ICD-10-CM

## 2017-08-25 DIAGNOSIS — F14.21 COCAINE DEPENDENCE, IN REMISSION (H): ICD-10-CM

## 2017-08-25 DIAGNOSIS — F41.1 GAD (GENERALIZED ANXIETY DISORDER): ICD-10-CM

## 2017-08-25 DIAGNOSIS — F43.10 PTSD (POST-TRAUMATIC STRESS DISORDER): ICD-10-CM

## 2017-08-31 ENCOUNTER — COMMUNICATION - HEALTHEAST (OUTPATIENT)
Dept: BEHAVIORAL HEALTH | Facility: CLINIC | Age: 42
End: 2017-08-31

## 2017-08-31 DIAGNOSIS — F31.9 BIPOLAR 1 DISORDER (H): ICD-10-CM

## 2017-09-04 ENCOUNTER — AMBULATORY - HEALTHEAST (OUTPATIENT)
Dept: ADDICTION MEDICINE | Facility: CLINIC | Age: 42
End: 2017-09-04

## 2017-09-05 ENCOUNTER — OFFICE VISIT - HEALTHEAST (OUTPATIENT)
Dept: ADDICTION MEDICINE | Facility: CLINIC | Age: 42
End: 2017-09-05

## 2017-09-05 DIAGNOSIS — F14.20 COCAINE USE DISORDER, SEVERE, DEPENDENCE (H): ICD-10-CM

## 2017-09-06 ENCOUNTER — AMBULATORY - HEALTHEAST (OUTPATIENT)
Dept: ADDICTION MEDICINE | Facility: CLINIC | Age: 42
End: 2017-09-06

## 2017-09-08 ENCOUNTER — OFFICE VISIT - HEALTHEAST (OUTPATIENT)
Dept: BEHAVIORAL HEALTH | Facility: CLINIC | Age: 42
End: 2017-09-08

## 2017-09-08 DIAGNOSIS — F14.21 COCAINE DEPENDENCE, IN REMISSION (H): ICD-10-CM

## 2017-09-08 DIAGNOSIS — F43.10 PTSD (POST-TRAUMATIC STRESS DISORDER): ICD-10-CM

## 2017-09-08 DIAGNOSIS — F41.1 GAD (GENERALIZED ANXIETY DISORDER): ICD-10-CM

## 2017-09-08 DIAGNOSIS — F31.31 BIPOLAR 1 DISORDER, DEPRESSED, MILD (H): ICD-10-CM

## 2017-09-19 ENCOUNTER — OFFICE VISIT - HEALTHEAST (OUTPATIENT)
Dept: ADDICTION MEDICINE | Facility: CLINIC | Age: 42
End: 2017-09-19

## 2017-09-19 DIAGNOSIS — F14.20 COCAINE USE DISORDER, SEVERE, DEPENDENCE (H): ICD-10-CM

## 2017-09-20 ENCOUNTER — AMBULATORY - HEALTHEAST (OUTPATIENT)
Dept: ADDICTION MEDICINE | Facility: CLINIC | Age: 42
End: 2017-09-20

## 2017-09-22 ENCOUNTER — OFFICE VISIT - HEALTHEAST (OUTPATIENT)
Dept: BEHAVIORAL HEALTH | Facility: CLINIC | Age: 42
End: 2017-09-22

## 2017-09-22 DIAGNOSIS — F43.10 PTSD (POST-TRAUMATIC STRESS DISORDER): ICD-10-CM

## 2017-09-22 DIAGNOSIS — F14.21 COCAINE DEPENDENCE, IN REMISSION (H): ICD-10-CM

## 2017-09-22 DIAGNOSIS — F41.1 GAD (GENERALIZED ANXIETY DISORDER): ICD-10-CM

## 2017-09-22 DIAGNOSIS — F31.31 BIPOLAR 1 DISORDER, DEPRESSED, MILD (H): ICD-10-CM

## 2017-09-26 ENCOUNTER — OFFICE VISIT - HEALTHEAST (OUTPATIENT)
Dept: ADDICTION MEDICINE | Facility: CLINIC | Age: 42
End: 2017-09-26

## 2017-09-26 DIAGNOSIS — F14.20 COCAINE USE DISORDER, SEVERE, DEPENDENCE (H): ICD-10-CM

## 2017-09-27 ENCOUNTER — AMBULATORY - HEALTHEAST (OUTPATIENT)
Dept: ADDICTION MEDICINE | Facility: CLINIC | Age: 42
End: 2017-09-27

## 2017-10-03 ENCOUNTER — OFFICE VISIT - HEALTHEAST (OUTPATIENT)
Dept: ADDICTION MEDICINE | Facility: CLINIC | Age: 42
End: 2017-10-03

## 2017-10-03 DIAGNOSIS — F14.20 COCAINE USE DISORDER, SEVERE, DEPENDENCE (H): ICD-10-CM

## 2017-10-04 ENCOUNTER — OFFICE VISIT - HEALTHEAST (OUTPATIENT)
Dept: BEHAVIORAL HEALTH | Facility: CLINIC | Age: 42
End: 2017-10-04

## 2017-10-04 ENCOUNTER — AMBULATORY - HEALTHEAST (OUTPATIENT)
Dept: ADDICTION MEDICINE | Facility: CLINIC | Age: 42
End: 2017-10-04

## 2017-10-04 DIAGNOSIS — F31.32 MODERATE DEPRESSED BIPOLAR I DISORDER (H): ICD-10-CM

## 2017-10-04 DIAGNOSIS — F43.21 ADJUSTMENT DISORDER WITH DEPRESSED MOOD: ICD-10-CM

## 2017-10-04 DIAGNOSIS — F17.200 NICOTINE ADDICTION: ICD-10-CM

## 2017-10-04 DIAGNOSIS — F41.1 GAD (GENERALIZED ANXIETY DISORDER): ICD-10-CM

## 2017-10-04 DIAGNOSIS — F31.31 BIPOLAR 1 DISORDER, DEPRESSED, MILD (H): ICD-10-CM

## 2017-10-04 DIAGNOSIS — F43.10 PTSD (POST-TRAUMATIC STRESS DISORDER): ICD-10-CM

## 2017-10-04 DIAGNOSIS — F12.20 MARIJUANA DEPENDENCE (H): ICD-10-CM

## 2017-10-04 DIAGNOSIS — F14.21 COCAINE DEPENDENCE IN REMISSION (H): ICD-10-CM

## 2017-10-04 DIAGNOSIS — F14.21: ICD-10-CM

## 2017-10-04 DIAGNOSIS — F10.21 ALCOHOL DEPENDENCE IN REMISSION (H): ICD-10-CM

## 2017-10-04 DIAGNOSIS — F31.9 BIPOLAR 1 DISORDER (H): ICD-10-CM

## 2017-10-04 ASSESSMENT — MIFFLIN-ST. JEOR: SCORE: 1530.39

## 2017-10-17 ENCOUNTER — OFFICE VISIT - HEALTHEAST (OUTPATIENT)
Dept: ADDICTION MEDICINE | Facility: CLINIC | Age: 42
End: 2017-10-17

## 2017-10-17 DIAGNOSIS — F14.20 COCAINE USE DISORDER, SEVERE, DEPENDENCE (H): ICD-10-CM

## 2017-10-18 ENCOUNTER — OFFICE VISIT - HEALTHEAST (OUTPATIENT)
Dept: BEHAVIORAL HEALTH | Facility: CLINIC | Age: 42
End: 2017-10-18

## 2017-10-18 DIAGNOSIS — F31.31 BIPOLAR 1 DISORDER, DEPRESSED, MILD (H): ICD-10-CM

## 2017-10-18 DIAGNOSIS — F41.1 GAD (GENERALIZED ANXIETY DISORDER): ICD-10-CM

## 2017-10-18 DIAGNOSIS — F43.10 PTSD (POST-TRAUMATIC STRESS DISORDER): ICD-10-CM

## 2017-10-20 ENCOUNTER — AMBULATORY - HEALTHEAST (OUTPATIENT)
Dept: ADDICTION MEDICINE | Facility: CLINIC | Age: 42
End: 2017-10-20

## 2017-11-06 ENCOUNTER — AMBULATORY - HEALTHEAST (OUTPATIENT)
Dept: ADDICTION MEDICINE | Facility: CLINIC | Age: 42
End: 2017-11-06

## 2017-11-07 ENCOUNTER — OFFICE VISIT - HEALTHEAST (OUTPATIENT)
Dept: ADDICTION MEDICINE | Facility: CLINIC | Age: 42
End: 2017-11-07

## 2017-11-07 DIAGNOSIS — F14.20 COCAINE USE DISORDER, SEVERE, DEPENDENCE (H): ICD-10-CM

## 2017-11-10 ENCOUNTER — AMBULATORY - HEALTHEAST (OUTPATIENT)
Dept: ADDICTION MEDICINE | Facility: CLINIC | Age: 42
End: 2017-11-10

## 2017-11-14 ENCOUNTER — OFFICE VISIT - HEALTHEAST (OUTPATIENT)
Dept: ADDICTION MEDICINE | Facility: CLINIC | Age: 42
End: 2017-11-14

## 2017-11-14 DIAGNOSIS — F14.20 COCAINE USE DISORDER, SEVERE, DEPENDENCE (H): ICD-10-CM

## 2017-11-17 ENCOUNTER — AMBULATORY - HEALTHEAST (OUTPATIENT)
Dept: ADDICTION MEDICINE | Facility: CLINIC | Age: 42
End: 2017-11-17

## 2017-11-21 ENCOUNTER — AMBULATORY - HEALTHEAST (OUTPATIENT)
Dept: ADDICTION MEDICINE | Facility: CLINIC | Age: 42
End: 2017-11-21

## 2017-11-21 ENCOUNTER — OFFICE VISIT - HEALTHEAST (OUTPATIENT)
Dept: ADDICTION MEDICINE | Facility: CLINIC | Age: 42
End: 2017-11-21

## 2017-11-21 DIAGNOSIS — F14.20 COCAINE USE DISORDER, SEVERE, DEPENDENCE (H): ICD-10-CM

## 2017-11-28 ENCOUNTER — OFFICE VISIT - HEALTHEAST (OUTPATIENT)
Dept: ADDICTION MEDICINE | Facility: CLINIC | Age: 42
End: 2017-11-28

## 2017-11-28 DIAGNOSIS — F14.20 COCAINE USE DISORDER, SEVERE, DEPENDENCE (H): ICD-10-CM

## 2017-11-29 ENCOUNTER — AMBULATORY - HEALTHEAST (OUTPATIENT)
Dept: BEHAVIORAL HEALTH | Facility: CLINIC | Age: 42
End: 2017-11-29

## 2017-12-01 ENCOUNTER — AMBULATORY - HEALTHEAST (OUTPATIENT)
Dept: ADDICTION MEDICINE | Facility: CLINIC | Age: 42
End: 2017-12-01

## 2017-12-05 ENCOUNTER — AMBULATORY - HEALTHEAST (OUTPATIENT)
Dept: BEHAVIORAL HEALTH | Facility: HOSPITAL | Age: 42
End: 2017-12-05

## 2017-12-05 ENCOUNTER — AMBULATORY - HEALTHEAST (OUTPATIENT)
Dept: LAB | Facility: CLINIC | Age: 42
End: 2017-12-05

## 2017-12-05 DIAGNOSIS — Z79.899 ENCOUNTER FOR LONG-TERM (CURRENT) USE OF HIGH-RISK MEDICATION: ICD-10-CM

## 2017-12-06 ENCOUNTER — COMMUNICATION - HEALTHEAST (OUTPATIENT)
Dept: BEHAVIORAL HEALTH | Facility: CLINIC | Age: 42
End: 2017-12-06

## 2017-12-06 ENCOUNTER — OFFICE VISIT - HEALTHEAST (OUTPATIENT)
Dept: BEHAVIORAL HEALTH | Facility: CLINIC | Age: 42
End: 2017-12-06

## 2017-12-06 DIAGNOSIS — F31.31 BIPOLAR 1 DISORDER, DEPRESSED, MILD (H): ICD-10-CM

## 2017-12-06 DIAGNOSIS — F12.11 MARIJUANA ABUSE IN REMISSION: ICD-10-CM

## 2017-12-06 DIAGNOSIS — F31.32 MODERATE DEPRESSED BIPOLAR I DISORDER (H): ICD-10-CM

## 2017-12-06 DIAGNOSIS — F41.1 GAD (GENERALIZED ANXIETY DISORDER): ICD-10-CM

## 2017-12-06 DIAGNOSIS — F43.10 PTSD (POST-TRAUMATIC STRESS DISORDER): ICD-10-CM

## 2017-12-06 DIAGNOSIS — F10.21 ALCOHOL DEPENDENCE IN REMISSION (H): ICD-10-CM

## 2017-12-06 DIAGNOSIS — F43.21 ADJUSTMENT DISORDER WITH DEPRESSED MOOD: ICD-10-CM

## 2017-12-06 DIAGNOSIS — F14.21 COCAINE DEPENDENCE IN REMISSION (H): ICD-10-CM

## 2017-12-06 ASSESSMENT — MIFFLIN-ST. JEOR: SCORE: 1544.41

## 2017-12-20 ENCOUNTER — OFFICE VISIT - HEALTHEAST (OUTPATIENT)
Dept: BEHAVIORAL HEALTH | Facility: CLINIC | Age: 42
End: 2017-12-20

## 2017-12-20 DIAGNOSIS — F31.31 BIPOLAR 1 DISORDER, DEPRESSED, MILD (H): ICD-10-CM

## 2017-12-20 DIAGNOSIS — F43.10 PTSD (POST-TRAUMATIC STRESS DISORDER): ICD-10-CM

## 2017-12-20 DIAGNOSIS — F14.21 COCAINE DEPENDENCE IN REMISSION (H): ICD-10-CM

## 2017-12-20 DIAGNOSIS — F41.1 GAD (GENERALIZED ANXIETY DISORDER): ICD-10-CM

## 2017-12-27 ENCOUNTER — AMBULATORY - HEALTHEAST (OUTPATIENT)
Dept: BEHAVIORAL HEALTH | Facility: CLINIC | Age: 42
End: 2017-12-27

## 2018-02-07 ENCOUNTER — COMMUNICATION - HEALTHEAST (OUTPATIENT)
Dept: BEHAVIORAL HEALTH | Facility: CLINIC | Age: 43
End: 2018-02-07

## 2018-03-21 ENCOUNTER — OFFICE VISIT - HEALTHEAST (OUTPATIENT)
Dept: BEHAVIORAL HEALTH | Facility: CLINIC | Age: 43
End: 2018-03-21

## 2018-03-21 DIAGNOSIS — F41.1 GAD (GENERALIZED ANXIETY DISORDER): ICD-10-CM

## 2018-03-21 DIAGNOSIS — F14.21 COCAINE DEPENDENCE IN REMISSION (H): ICD-10-CM

## 2018-03-21 DIAGNOSIS — Z79.899 ENCOUNTER FOR LONG-TERM (CURRENT) USE OF MEDICATIONS: ICD-10-CM

## 2018-03-21 DIAGNOSIS — F31.75 BIPOLAR AFFECTIVE DISORDER, DEPRESSED IN PARTIAL REMISSION (H): ICD-10-CM

## 2018-03-21 DIAGNOSIS — F43.10 PTSD (POST-TRAUMATIC STRESS DISORDER): ICD-10-CM

## 2018-03-21 DIAGNOSIS — F31.32 MODERATE DEPRESSED BIPOLAR I DISORDER (H): ICD-10-CM

## 2018-03-21 DIAGNOSIS — F31.9 BIPOLAR 1 DISORDER (H): ICD-10-CM

## 2018-03-21 DIAGNOSIS — F31.31 BIPOLAR 1 DISORDER, DEPRESSED, MILD (H): ICD-10-CM

## 2018-03-21 ASSESSMENT — MIFFLIN-ST. JEOR: SCORE: 1557.61

## 2018-05-21 ENCOUNTER — RECORDS - HEALTHEAST (OUTPATIENT)
Dept: LAB | Facility: CLINIC | Age: 43
End: 2018-05-21

## 2018-05-21 LAB
ALBUMIN SERPL-MCNC: 3.6 G/DL (ref 3.5–5)
ALP SERPL-CCNC: 75 U/L (ref 45–120)
ALT SERPL W P-5'-P-CCNC: 13 U/L (ref 0–45)
ANION GAP SERPL CALCULATED.3IONS-SCNC: 11 MMOL/L (ref 5–18)
AST SERPL W P-5'-P-CCNC: 12 U/L (ref 0–40)
BILIRUB SERPL-MCNC: 0.4 MG/DL (ref 0–1)
BUN SERPL-MCNC: 10 MG/DL (ref 8–22)
CALCIUM SERPL-MCNC: 9.7 MG/DL (ref 8.5–10.5)
CHLORIDE BLD-SCNC: 101 MMOL/L (ref 98–107)
CHOLEST SERPL-MCNC: 155 MG/DL
CO2 SERPL-SCNC: 26 MMOL/L (ref 22–31)
CREAT SERPL-MCNC: 0.73 MG/DL (ref 0.6–1.1)
FASTING STATUS PATIENT QL REPORTED: NORMAL
GFR SERPL CREATININE-BSD FRML MDRD: >60 ML/MIN/1.73M2
GLUCOSE BLD-MCNC: 102 MG/DL (ref 70–125)
HDLC SERPL-MCNC: 65 MG/DL
LDLC SERPL CALC-MCNC: 79 MG/DL
POTASSIUM BLD-SCNC: 4.7 MMOL/L (ref 3.5–5)
PROT SERPL-MCNC: 6.9 G/DL (ref 6–8)
SODIUM SERPL-SCNC: 138 MMOL/L (ref 136–145)
TRIGL SERPL-MCNC: 56 MG/DL
TSH SERPL DL<=0.005 MIU/L-ACNC: 1.41 UIU/ML (ref 0.3–5)

## 2018-06-20 ENCOUNTER — OFFICE VISIT - HEALTHEAST (OUTPATIENT)
Dept: BEHAVIORAL HEALTH | Facility: CLINIC | Age: 43
End: 2018-06-20

## 2018-06-20 ENCOUNTER — AMBULATORY - HEALTHEAST (OUTPATIENT)
Dept: LAB | Facility: CLINIC | Age: 43
End: 2018-06-20

## 2018-06-20 DIAGNOSIS — F14.21 COCAINE DEPENDENCE IN REMISSION (H): ICD-10-CM

## 2018-06-20 DIAGNOSIS — F31.31 MILD DEPRESSED BIPOLAR I DISORDER (H): ICD-10-CM

## 2018-06-20 DIAGNOSIS — F43.10 PTSD (POST-TRAUMATIC STRESS DISORDER): ICD-10-CM

## 2018-06-20 DIAGNOSIS — F31.31 BIPOLAR 1 DISORDER, DEPRESSED, MILD (H): ICD-10-CM

## 2018-06-20 DIAGNOSIS — F41.1 GENERALIZED ANXIETY DISORDER: ICD-10-CM

## 2018-06-20 DIAGNOSIS — Z79.899 ENCOUNTER FOR LONG-TERM (CURRENT) USE OF MEDICATIONS: ICD-10-CM

## 2018-06-20 LAB
ALBUMIN SERPL-MCNC: 3.3 G/DL (ref 3.5–5)
ALP SERPL-CCNC: 73 U/L (ref 45–120)
ALT SERPL W P-5'-P-CCNC: 29 U/L (ref 0–45)
AST SERPL W P-5'-P-CCNC: ABNORMAL U/L (ref 0–40)
BILIRUB DIRECT SERPL-MCNC: <0.1 MG/DL
BILIRUB SERPL-MCNC: 0.2 MG/DL (ref 0–1)
PLATELET # BLD AUTO: 308 THOU/UL (ref 140–440)
PROT SERPL-MCNC: 6.7 G/DL (ref 6–8)
VALPROATE SERPL-MCNC: 86.4 UG/ML (ref 50–150)
WBC: 8 THOU/UL (ref 4–11)

## 2018-06-20 ASSESSMENT — MIFFLIN-ST. JEOR: SCORE: 1584.83

## 2018-09-19 ENCOUNTER — OFFICE VISIT - HEALTHEAST (OUTPATIENT)
Dept: BEHAVIORAL HEALTH | Facility: CLINIC | Age: 43
End: 2018-09-19

## 2018-09-19 DIAGNOSIS — F10.11 ALCOHOL ABUSE, IN REMISSION: ICD-10-CM

## 2018-09-19 DIAGNOSIS — F41.1 GAD (GENERALIZED ANXIETY DISORDER): ICD-10-CM

## 2018-09-19 DIAGNOSIS — F31.32 MODERATE DEPRESSED BIPOLAR I DISORDER (H): ICD-10-CM

## 2018-09-19 DIAGNOSIS — F14.21 COCAINE DEPENDENCE IN REMISSION (H): ICD-10-CM

## 2018-09-19 DIAGNOSIS — F31.9 BIPOLAR 1 DISORDER (H): ICD-10-CM

## 2018-09-19 DIAGNOSIS — F31.31 BIPOLAR 1 DISORDER, DEPRESSED, MILD (H): ICD-10-CM

## 2018-09-19 RX ORDER — HYDROXYZINE PAMOATE 50 MG/1
50 CAPSULE ORAL 3 TIMES DAILY PRN
Qty: 30 CAPSULE | Refills: 5 | Status: SHIPPED | OUTPATIENT
Start: 2018-09-19 | End: 2022-05-03

## 2018-09-19 ASSESSMENT — MIFFLIN-ST. JEOR: SCORE: 1575.75

## 2018-10-31 ENCOUNTER — COMMUNICATION - HEALTHEAST (OUTPATIENT)
Dept: BEHAVIORAL HEALTH | Facility: CLINIC | Age: 43
End: 2018-10-31

## 2018-10-31 DIAGNOSIS — F31.32 MODERATE DEPRESSED BIPOLAR I DISORDER (H): ICD-10-CM

## 2018-12-19 ENCOUNTER — AMBULATORY - HEALTHEAST (OUTPATIENT)
Dept: LAB | Facility: CLINIC | Age: 43
End: 2018-12-19

## 2018-12-19 ENCOUNTER — OFFICE VISIT - HEALTHEAST (OUTPATIENT)
Dept: BEHAVIORAL HEALTH | Facility: CLINIC | Age: 43
End: 2018-12-19

## 2018-12-19 DIAGNOSIS — Z79.899 ENCOUNTER FOR LONG-TERM (CURRENT) USE OF HIGH-RISK MEDICATION: ICD-10-CM

## 2018-12-19 DIAGNOSIS — F14.21 COCAINE DEPENDENCE IN REMISSION (H): ICD-10-CM

## 2018-12-19 DIAGNOSIS — F43.10 PTSD (POST-TRAUMATIC STRESS DISORDER): ICD-10-CM

## 2018-12-19 DIAGNOSIS — F31.31 BIPOLAR 1 DISORDER, DEPRESSED, MILD (H): ICD-10-CM

## 2018-12-19 DIAGNOSIS — F41.1 GAD (GENERALIZED ANXIETY DISORDER): ICD-10-CM

## 2018-12-19 DIAGNOSIS — F12.21 CANNABIS DEPENDENCE IN REMISSION (H): ICD-10-CM

## 2018-12-19 DIAGNOSIS — F17.200 NICOTINE DEPENDENCE, UNCOMPLICATED, UNSPECIFIED NICOTINE PRODUCT TYPE: ICD-10-CM

## 2018-12-19 DIAGNOSIS — F10.21 ALCOHOL DEPENDENCE IN REMISSION (H): ICD-10-CM

## 2018-12-19 LAB
ALBUMIN SERPL-MCNC: 3.4 G/DL (ref 3.5–5)
ALP SERPL-CCNC: 63 U/L (ref 45–120)
ALT SERPL W P-5'-P-CCNC: 19 U/L (ref 0–45)
AST SERPL W P-5'-P-CCNC: 13 U/L (ref 0–40)
BILIRUB DIRECT SERPL-MCNC: 0.1 MG/DL
BILIRUB SERPL-MCNC: 0.3 MG/DL (ref 0–1)
PLATELET # BLD AUTO: 330 THOU/UL (ref 140–440)
PROT SERPL-MCNC: 6.4 G/DL (ref 6–8)
VALPROATE SERPL-MCNC: 93.3 UG/ML (ref 50–150)
WBC: 8.5 THOU/UL (ref 4–11)

## 2018-12-19 ASSESSMENT — MIFFLIN-ST. JEOR: SCORE: 1585.01

## 2019-03-20 ENCOUNTER — OFFICE VISIT - HEALTHEAST (OUTPATIENT)
Dept: BEHAVIORAL HEALTH | Facility: CLINIC | Age: 44
End: 2019-03-20

## 2019-03-20 DIAGNOSIS — F31.31 BIPOLAR 1 DISORDER, DEPRESSED, MILD (H): ICD-10-CM

## 2019-03-20 DIAGNOSIS — F31.9 BIPOLAR 1 DISORDER (H): ICD-10-CM

## 2019-03-20 DIAGNOSIS — F41.1 GAD (GENERALIZED ANXIETY DISORDER): ICD-10-CM

## 2019-03-20 DIAGNOSIS — F10.21 ALCOHOL DEPENDENCE IN REMISSION (H): ICD-10-CM

## 2019-03-20 DIAGNOSIS — F31.32 MODERATE DEPRESSED BIPOLAR I DISORDER (H): ICD-10-CM

## 2019-03-20 DIAGNOSIS — F17.210 CIGARETTE NICOTINE DEPENDENCE WITHOUT COMPLICATION: ICD-10-CM

## 2019-03-20 DIAGNOSIS — F43.10 PTSD (POST-TRAUMATIC STRESS DISORDER): ICD-10-CM

## 2019-03-20 DIAGNOSIS — F14.21 COCAINE DEPENDENCE IN REMISSION (H): ICD-10-CM

## 2019-03-20 DIAGNOSIS — F12.21 CANNABIS DEPENDENCE IN REMISSION (H): ICD-10-CM

## 2019-03-20 RX ORDER — NICOTINE 21 MG/24HR
1 PATCH, TRANSDERMAL 24 HOURS TRANSDERMAL EVERY 24 HOURS
Qty: 7 PATCH | Refills: 0 | Status: SHIPPED | OUTPATIENT
Start: 2019-03-22 | End: 2022-05-03

## 2019-03-20 ASSESSMENT — MIFFLIN-ST. JEOR: SCORE: 1584.83

## 2019-05-09 ENCOUNTER — AMBULATORY - HEALTHEAST (OUTPATIENT)
Dept: BEHAVIORAL HEALTH | Facility: CLINIC | Age: 44
End: 2019-05-09

## 2019-05-17 ENCOUNTER — AMBULATORY - HEALTHEAST (OUTPATIENT)
Dept: BEHAVIORAL HEALTH | Facility: CLINIC | Age: 44
End: 2019-05-17

## 2019-05-17 ENCOUNTER — OFFICE VISIT - HEALTHEAST (OUTPATIENT)
Dept: BEHAVIORAL HEALTH | Facility: CLINIC | Age: 44
End: 2019-05-17

## 2019-05-17 DIAGNOSIS — F31.31 BIPOLAR 1 DISORDER, DEPRESSED, MILD (H): ICD-10-CM

## 2019-05-17 DIAGNOSIS — F43.10 PTSD (POST-TRAUMATIC STRESS DISORDER): ICD-10-CM

## 2019-05-17 DIAGNOSIS — F41.1 GAD (GENERALIZED ANXIETY DISORDER): ICD-10-CM

## 2019-06-26 ENCOUNTER — COMMUNICATION - HEALTHEAST (OUTPATIENT)
Dept: BEHAVIORAL HEALTH | Facility: CLINIC | Age: 44
End: 2019-06-26

## 2019-08-15 ENCOUNTER — AMBULATORY - HEALTHEAST (OUTPATIENT)
Dept: BEHAVIORAL HEALTH | Facility: CLINIC | Age: 44
End: 2019-08-15

## 2019-08-15 ENCOUNTER — OFFICE VISIT - HEALTHEAST (OUTPATIENT)
Dept: BEHAVIORAL HEALTH | Facility: CLINIC | Age: 44
End: 2019-08-15

## 2019-08-15 DIAGNOSIS — F31.31 BIPOLAR 1 DISORDER, DEPRESSED, MILD (H): ICD-10-CM

## 2019-08-15 DIAGNOSIS — F43.10 PTSD (POST-TRAUMATIC STRESS DISORDER): ICD-10-CM

## 2019-08-15 DIAGNOSIS — F14.21 COCAINE DEPENDENCE IN REMISSION (H): ICD-10-CM

## 2019-09-02 ENCOUNTER — COMMUNICATION - HEALTHEAST (OUTPATIENT)
Dept: BEHAVIORAL HEALTH | Facility: CLINIC | Age: 44
End: 2019-09-02

## 2019-09-02 DIAGNOSIS — F31.32 MODERATE DEPRESSED BIPOLAR I DISORDER (H): ICD-10-CM

## 2019-09-12 ENCOUNTER — COMMUNICATION - HEALTHEAST (OUTPATIENT)
Dept: BEHAVIORAL HEALTH | Facility: CLINIC | Age: 44
End: 2019-09-12

## 2019-09-12 ENCOUNTER — AMBULATORY - HEALTHEAST (OUTPATIENT)
Dept: BEHAVIORAL HEALTH | Facility: CLINIC | Age: 44
End: 2019-09-12

## 2019-09-25 ENCOUNTER — COMMUNICATION - HEALTHEAST (OUTPATIENT)
Dept: BEHAVIORAL HEALTH | Facility: CLINIC | Age: 44
End: 2019-09-25

## 2019-09-25 ENCOUNTER — AMBULATORY - HEALTHEAST (OUTPATIENT)
Dept: BEHAVIORAL HEALTH | Facility: CLINIC | Age: 44
End: 2019-09-25

## 2019-10-02 ENCOUNTER — COMMUNICATION - HEALTHEAST (OUTPATIENT)
Dept: BEHAVIORAL HEALTH | Facility: CLINIC | Age: 44
End: 2019-10-02

## 2019-10-02 DIAGNOSIS — F31.9 BIPOLAR 1 DISORDER (H): ICD-10-CM

## 2019-11-01 ENCOUNTER — COMMUNICATION - HEALTHEAST (OUTPATIENT)
Dept: BEHAVIORAL HEALTH | Facility: CLINIC | Age: 44
End: 2019-11-01

## 2019-11-01 DIAGNOSIS — F31.32 MODERATE DEPRESSED BIPOLAR I DISORDER (H): ICD-10-CM

## 2019-11-12 ENCOUNTER — OFFICE VISIT - HEALTHEAST (OUTPATIENT)
Dept: BEHAVIORAL HEALTH | Facility: CLINIC | Age: 44
End: 2019-11-12

## 2019-11-12 DIAGNOSIS — Z79.899 ENCOUNTER FOR LONG-TERM (CURRENT) USE OF MEDICATIONS: ICD-10-CM

## 2019-11-12 DIAGNOSIS — F31.31 BIPOLAR 1 DISORDER, DEPRESSED, MILD (H): ICD-10-CM

## 2019-11-12 DIAGNOSIS — F17.210 CIGARETTE NICOTINE DEPENDENCE WITHOUT COMPLICATION: ICD-10-CM

## 2019-11-12 DIAGNOSIS — F43.10 PTSD (POST-TRAUMATIC STRESS DISORDER): ICD-10-CM

## 2019-11-12 DIAGNOSIS — F14.21 COCAINE DEPENDENCE IN REMISSION (H): ICD-10-CM

## 2019-11-12 DIAGNOSIS — F10.21 ALCOHOL DEPENDENCE IN REMISSION (H): ICD-10-CM

## 2019-11-12 DIAGNOSIS — F41.1 GAD (GENERALIZED ANXIETY DISORDER): ICD-10-CM

## 2019-11-12 ASSESSMENT — MIFFLIN-ST. JEOR: SCORE: 1516.79

## 2020-02-12 ENCOUNTER — OFFICE VISIT - HEALTHEAST (OUTPATIENT)
Dept: BEHAVIORAL HEALTH | Facility: CLINIC | Age: 45
End: 2020-02-12

## 2020-02-12 DIAGNOSIS — F31.31 BIPOLAR 1 DISORDER, DEPRESSED, MILD (H): ICD-10-CM

## 2020-02-12 DIAGNOSIS — F12.21 CANNABIS DEPENDENCE IN REMISSION (H): ICD-10-CM

## 2020-02-12 DIAGNOSIS — F10.21 ALCOHOL DEPENDENCE IN REMISSION (H): ICD-10-CM

## 2020-02-12 DIAGNOSIS — F17.210 CIGARETTE NICOTINE DEPENDENCE WITHOUT COMPLICATION: ICD-10-CM

## 2020-02-12 DIAGNOSIS — F14.21 COCAINE DEPENDENCE IN REMISSION (H): ICD-10-CM

## 2020-02-12 DIAGNOSIS — F43.10 PTSD (POST-TRAUMATIC STRESS DISORDER): ICD-10-CM

## 2020-02-12 DIAGNOSIS — F41.1 GAD (GENERALIZED ANXIETY DISORDER): ICD-10-CM

## 2020-02-12 ASSESSMENT — MIFFLIN-ST. JEOR: SCORE: 1462.35

## 2020-02-14 ENCOUNTER — COMMUNICATION - HEALTHEAST (OUTPATIENT)
Dept: BEHAVIORAL HEALTH | Facility: CLINIC | Age: 45
End: 2020-02-14

## 2020-02-14 DIAGNOSIS — F31.31 BIPOLAR 1 DISORDER, DEPRESSED, MILD (H): ICD-10-CM

## 2020-02-17 ENCOUNTER — RECORDS - HEALTHEAST (OUTPATIENT)
Dept: LAB | Facility: CLINIC | Age: 45
End: 2020-02-17

## 2020-02-17 LAB
ALBUMIN SERPL-MCNC: 3.8 G/DL (ref 3.5–5)
ALP SERPL-CCNC: 63 U/L (ref 45–120)
ALT SERPL W P-5'-P-CCNC: 19 U/L (ref 0–45)
ANION GAP SERPL CALCULATED.3IONS-SCNC: 10 MMOL/L (ref 5–18)
AST SERPL W P-5'-P-CCNC: 12 U/L (ref 0–40)
BILIRUB SERPL-MCNC: 0.6 MG/DL (ref 0–1)
BUN SERPL-MCNC: 6 MG/DL (ref 8–22)
CALCIUM SERPL-MCNC: 9.7 MG/DL (ref 8.5–10.5)
CHLORIDE BLD-SCNC: 102 MMOL/L (ref 98–107)
CHOLEST SERPL-MCNC: 147 MG/DL
CO2 SERPL-SCNC: 27 MMOL/L (ref 22–31)
CREAT SERPL-MCNC: 0.75 MG/DL (ref 0.6–1.1)
FASTING STATUS PATIENT QL REPORTED: YES
GFR SERPL CREATININE-BSD FRML MDRD: >60 ML/MIN/1.73M2
GLUCOSE BLD-MCNC: 109 MG/DL (ref 70–125)
HDLC SERPL-MCNC: 63 MG/DL
HIV 1+2 AB+HIV1 P24 AG SERPL QL IA: NEGATIVE
LDLC SERPL CALC-MCNC: 77 MG/DL
POTASSIUM BLD-SCNC: 4.5 MMOL/L (ref 3.5–5)
PROT SERPL-MCNC: 6.5 G/DL (ref 6–8)
SODIUM SERPL-SCNC: 139 MMOL/L (ref 136–145)
TRIGL SERPL-MCNC: 37 MG/DL
TSH SERPL DL<=0.005 MIU/L-ACNC: 1.71 UIU/ML (ref 0.3–5)

## 2020-02-26 LAB
BKR LAB AP ABNORMAL BLEEDING: NO
BKR LAB AP BIRTH CONTROL/HORMONES: NORMAL
BKR LAB AP CERVICAL APPEARANCE: NORMAL
BKR LAB AP GYN ADEQUACY: NORMAL
BKR LAB AP GYN INTERPRETATION: NORMAL
BKR LAB AP HPV REFLEX: NORMAL
BKR LAB AP LMP: NORMAL
BKR LAB AP PATIENT STATUS: NORMAL
BKR LAB AP PREVIOUS ABNORMAL: NORMAL
BKR LAB AP PREVIOUS NORMAL: NORMAL
HIGH RISK?: YES
PATH REPORT.COMMENTS IMP SPEC: NORMAL
RESULT FLAG (HE HISTORICAL CONVERSION): NORMAL

## 2020-02-27 LAB
HPV SOURCE: NORMAL
HUMAN PAPILLOMA VIRUS 16 DNA: NEGATIVE
HUMAN PAPILLOMA VIRUS 18 DNA: NEGATIVE
HUMAN PAPILLOMA VIRUS FINAL DIAGNOSIS: NORMAL
HUMAN PAPILLOMA VIRUS OTHER HR: NEGATIVE
SPECIMEN DESCRIPTION: NORMAL

## 2020-03-02 ENCOUNTER — RECORDS - HEALTHEAST (OUTPATIENT)
Dept: LAB | Facility: CLINIC | Age: 45
End: 2020-03-02

## 2020-03-03 LAB
C TRACH DNA SPEC QL PROBE+SIG AMP: NEGATIVE
N GONORRHOEA DNA SPEC QL NAA+PROBE: NEGATIVE

## 2020-05-07 ENCOUNTER — OFFICE VISIT - HEALTHEAST (OUTPATIENT)
Dept: FAMILY MEDICINE | Facility: CLINIC | Age: 45
End: 2020-05-07

## 2020-05-07 ENCOUNTER — VIRTUAL VISIT (OUTPATIENT)
Dept: FAMILY MEDICINE | Facility: OTHER | Age: 45
End: 2020-05-07

## 2020-05-07 DIAGNOSIS — Z20.822 SUSPECTED COVID-19 VIRUS INFECTION: ICD-10-CM

## 2020-05-07 NOTE — PROGRESS NOTES
"Date: 2020 08:39:54  Clinician: Alexandro Boogie  Clinician NPI: [id number]  Patient: Kandy Yañez  Patient : 1975  Patient Address:  [place] [address]  Patient Phone: [phone]  Visit Protocol: URI  Patient Summary:  Kandy is a 44 year old ( : 1975 ) female who initiated a Visit for COVID-19 (Coronavirus) evaluation and screening. When asked the question \"Please sign me up to receive news, health information and promotions. \", Kandy responded \"No\".    Kandy states her symptoms started gradually 3-4 days ago. After her symptoms started, they improved and then got worse again.   Her symptoms consist of myalgia, rhinitis, a cough, nasal congestion, a headache, malaise, and chills.   Symptom details     Nasal secretions: The color of her mucus is yellow.    Cough: Kandy coughs a few times an hour and her cough is not more bothersome at night. Phlegm does not come into her throat when she coughs. She does not believe her cough is caused by post-nasal drip.     Headache: She states the headache is mild (1-3 on a 10 point pain scale).      Kandy denies having fever, facial pain or pressure, sore throat, vomiting, nausea, teeth pain, ageusia, anosmia, diarrhea, ear pain, and wheezing. She also denies taking antibiotic medication for the symptoms, having a sinus infection within the past year, and having recent facial or sinus surgery in the past 60 days. She is not experiencing dyspnea.   Precipitating events  She has not recently been exposed to someone with influenza. Kandy has been in close contact with the following high risk individuals: immunocompromised people.   Pertinent COVID-19 (Coronavirus) information  Kandy does not work or volunteer as healthcare worker or a  and does not work or volunteer in a healthcare facility.   She lives with a healthcare worker.   Kandy has not had a close contact with a laboratory-confirmed COVID-19 patient within 14 days of symptom " onset. She has had a close contact with a suspected COVID-19 patient within 14 days of symptom onset. Additional information about contact with COVID-19 (Coronavirus) patient as reported by the patient (free text): The place I work at has had an outbreak of the coronavirus. Because they have not told us who the positive cases are, I dont know for sure if I have been in direct contact with those people. The people who were in direct contact with the infected were just sent home. I've been in direct contact with those people.   Pertinent medical history  Kandy does not get yeast infections when she takes antibiotics.   Kandy needs a return to work/school note.   Weight: 185 lbs   Kandy smokes or uses smokeless tobacco.   She denies pregnancy and denies breastfeeding. She has menstruated in the past month.   Weight: 185 lbs    MEDICATIONS: Effexor XR oral, Depakote oral, ALLERGIES: NKDA  Clinician Response:  Dear Kandy,   Your symptoms show that you may have coronavirus (COVID-19). This illness can cause fever, cough and trouble breathing. Many people get a mild case and get better on their own. Some people can get very sick.   Will I be tested for COVID-19?  We would recommend you be tested for coronavirus. Here is how to get that scheduled:   Call [phone]. Tell them you were referred by [facility] to have a COVID-19 test. You will be scheduled at one of our [facility] testing locations (drive-up). Please have your [facility] visit information ready when you call including your visit ID number to verify you were referred.    How can I protect others in the meantime?  First, stay home and away from others (self-isolate) until:   You've had no fever---and no medicine that reduces fever---for 3 full days (72 hours). And...    Your other symptoms have gotten better. For example, your cough or breathing has improved. And...    At least 7 days have passed since your symptoms started.   During this time:   Don't go to work, school or  anywhere else.     Stay away from others in your home. No hugging, kissing or shaking hands.    Don't let anyone visit.    Cover your mouth and nose with a mask, tissue or wash cloth to avoid spreading germs.    Wash your hands and face often. Use soap and water.   How can I take care of myself?  1.Take Tylenol (acetaminophen) for fever or pain. If you have liver or kidney problems, ask your family doctor if it's okay to take Tylenol.   Adults can take either:    650 mg (two 325 mg pills) every 4 to 6 hours, or...    1,000 mg (two 500 mg pills) every 8 hours as needed.     Note: Don't take more than 3,000 mg in one day.   For children, check the Tylenol bottle for the right dose. The dose is based on the child's age or weight.  2.If you have other health problems (like cancer, heart failure, an organ transplant or severe kidney disease): Call your specialty clinic if you don't feel better in the next 2 days.  3.Know when to call 911: If your breathing is so bad that it keeps you from doing normal activities, call 911 or go to the emergency room. Tell them that you've been staying home and may have COVID-19.  4.Sign up for [employer]. We know it's scary to hear that you might have COVID-19. We want to track your symptoms to make sure you're okay over the next 2 weeks. Please look for an email from [employer]---this is a free, online program that we'll use to keep in touch. To sign up, follow the link in the email. Learn more at [email or web address].  Where can I get more information?  To learn more about COVID-19 and how to care for yourself at home, please visit the CDC website at https://www.cdc.gov/coronavirus/2019-ncov/about/steps-when-sick.html.  For more about your care at [facility], please visit [email or web address]        COVID-19 (Coronavirus) General Information  Because there is currently no vaccine to prevent infection, the best way to protect yourself is to  avoid being exposed to this virus. Common symptoms of COVID-19 include but are not limited to fever, cough, and shortness of breath. These symptoms appear 2-14 days after you are exposed to the virus that causes COVID-19. Click here for more information from the CDC on how to protect yourself.  If you are sick with COVID-19 or suspect you are infected with the virus that causes COVID-19, follow the steps here from the CDC to help prevent the disease from spreading to people in your home and community.  Click here for general information from the CDC on testing.  If you develop any of these emergency warning signs for COVID-19, get medical attention immediately:     Trouble breathing    Persistent pain or pressure in the chest    New confusion or inability to arouse    Bluish lips or face      Call your doctor or clinic before going in. Call 911 if you have a medical emergency and notify the  you have or think you may have COVID-19.  For more detailed and up to date information on COVID-19 (Coronavirus), please visit the CDC website.   Diagnosis: Cough  Diagnosis ICD: R05  Addendum created: May 07 12:55:14, 2020 created by: Mendoza Lee body: I reviewed the e-visit and discussed the patient with the resident and agree with the resident's assessment and plan of care as documented.

## 2020-05-09 ENCOUNTER — COMMUNICATION - HEALTHEAST (OUTPATIENT)
Dept: FAMILY MEDICINE | Facility: CLINIC | Age: 45
End: 2020-05-09

## 2020-05-11 ENCOUNTER — NURSE TRIAGE (OUTPATIENT)
Dept: NURSING | Facility: CLINIC | Age: 45
End: 2020-05-11

## 2020-05-11 ENCOUNTER — VIRTUAL VISIT (OUTPATIENT)
Dept: FAMILY MEDICINE | Facility: OTHER | Age: 45
End: 2020-05-11

## 2020-05-11 NOTE — PROGRESS NOTES
"Date: 2020 10:56:27  Clinician: Michael Cerda  Clinician NPI: [id number]  Patient: Kandy Yañez  Patient : 1975  Patient Address:  [place] [address]  Patient Phone: [phone]  Visit Protocol: URI  Patient Summary:  Kandy is a 44 year old ( : 1975 ) female who initiated a Visit for COVID-19 (Coronavirus) evaluation and screening. When asked the question \"Please sign me up to receive news, health information and promotions. \", Kandy responded \"No\".    Kandy does not have any symptoms. Kandy denies having fever, myalgias, rhinitis, facial pain or pressure, sore throat, cough, nasal congestion, vomiting, nausea, teeth pain, ageusia, anosmia, diarrhea, ear pain, headache, malaise, wheezing, enlarged lymph nodes, and chills. She also denies taking antibiotic medication for the symptoms and having recent facial or sinus surgery in the past 60 days. She is not experiencing dyspnea.    Pertinent COVID-19 (Coronavirus) information  Kandy does not work or volunteer as healthcare worker or a  and does not work or volunteer in a healthcare facility.   She lives with a healthcare worker.   Kandy has had a close contact with a laboratory-confirmed COVID-19 patient within 14 days of symptom onset. She also has had a close contact with a suspected COVID-19 patient within 14 days of symptom onset. Additional information about contact with COVID-19 (Coronavirus) patient as reported by the patient (free text): I was tested for covid on 20 at 1:30pm at [facility] in [place], [place]. My work wants proof that I tested negative in order for me to return to work.   Triage Point(s) temporarily suspended for COVID-19 (Coronavirus) screening  Kandy reported the following symptoms which were previously protocol referral points. These protocol referral points have temporarily been removed for purposes of COVID-19 (Coronavirus) screening.   Denied all URI symptoms   Pertinent " medical history  Kandy does not get yeast infections when she takes antibiotics.   Kandy needs a return to work/school note.   Weight: 183 lbs   Kandy smokes or uses smokeless tobacco.   She denies pregnancy and denies breastfeeding. She has menstruated in the past month.   Additional information as reported by the patient (free text): I need to know my test results from 5/7/20. [facility] said [facility] is nothing [facility] In order for me to return to work I have to show proof that I tested with negative results.   Weight: 183 lbs    MEDICATIONS: Depakote oral, Effexor XR oral, ALLERGIES: NKDA  Clinician Response:  Dear Kandy,  Based on the information provided, you have a viral upper respiratory infection, otherwise known as a cold. Symptoms vary from person to person, but can include sneezing, coughing, a runny nose, sore throat, and headache and range from mild to severe.  Unfortunately, there are no medications that can cure a cold, so treatment is focused on controlling symptoms as much as possible. Most people gradually feel better until symptoms are gone in 1-2 weeks.  Medication information  Because you have a viral infection, antibiotics will not help you get better. Treating a viral infection with antibiotics could actually make you feel worse.  Self care  Steps you can take to be as comfortable as possible:     Rest.    Drink plenty of fluids.     Also, as your provider, I need you to know that becoming tobacco-free is the most important thing you can do to protect your current and future health.  When to seek care  Please be seen in a clinic or urgent care if any of the following occur:   New symptoms develop, or symptoms become worse   Call ahead before going to the clinic or urgent care.  COVID-19 (Coronavirus) General Information  Because there is currently no vaccine to prevent infection, the best way to protect yourself is to avoid being exposed to this virus. Common  symptoms of COVID-19 include but are not limited to fever, cough, and shortness of breath. These symptoms appear 2-14 days after you are exposed to the virus that causes COVID-19. Click here for more information from the CDC on how to protect yourself.  If you are sick with COVID-19 or suspect you are infected with the virus that causes COVID-19, follow the steps here from the CDC to help prevent the disease from spreading to people in your home and community.  Click here for general information from the CDC on testing.  If you develop any of these emergency warning signs for COVID-19, get medical attention immediately:     Trouble breathing    Persistent pain or pressure in the chest    New confusion or inability to arouse    Bluish lips or face      Call your doctor or clinic before going in. Call 911 if you have a medical emergency and notify the  you have or think you may have COVID-19.  For more detailed and up to date information on COVID-19 (Coronavirus), please visit the CDC website.   Diagnosis: Viral URI  Diagnosis ICD: J06.9

## 2020-05-11 NOTE — TELEPHONE ENCOUNTER
Pateint calling about [facility] covid results.  None in [facility] as [facility].  Call [facility] or go [facility].    Azul Orta RN  [facility] Nurse Advisor

## 2020-05-14 ENCOUNTER — COMMUNICATION - HEALTHEAST (OUTPATIENT)
Dept: FAMILY MEDICINE | Facility: CLINIC | Age: 45
End: 2020-05-14

## 2020-06-09 ENCOUNTER — AMBULATORY - HEALTHEAST (OUTPATIENT)
Dept: LAB | Facility: CLINIC | Age: 45
End: 2020-06-09

## 2020-06-09 DIAGNOSIS — Z79.899 ENCOUNTER FOR LONG-TERM (CURRENT) USE OF MEDICATIONS: ICD-10-CM

## 2020-06-09 LAB
ALBUMIN SERPL-MCNC: 3.9 G/DL (ref 3.5–5)
ALP SERPL-CCNC: 58 U/L (ref 45–120)
ALT SERPL W P-5'-P-CCNC: 13 U/L (ref 0–45)
AST SERPL W P-5'-P-CCNC: 15 U/L (ref 0–40)
BILIRUB DIRECT SERPL-MCNC: 0.2 MG/DL
BILIRUB SERPL-MCNC: 0.7 MG/DL (ref 0–1)
PLATELET # BLD AUTO: 345 THOU/UL (ref 140–440)
PROT SERPL-MCNC: 7.1 G/DL (ref 6–8)
VALPROATE SERPL-MCNC: 47.1 UG/ML (ref 50–150)
WBC: 7.2 THOU/UL (ref 4–11)

## 2020-06-10 ENCOUNTER — OFFICE VISIT - HEALTHEAST (OUTPATIENT)
Dept: BEHAVIORAL HEALTH | Facility: CLINIC | Age: 45
End: 2020-06-10

## 2020-06-10 DIAGNOSIS — Z79.899 ENCOUNTER FOR LONG-TERM (CURRENT) USE OF MEDICATIONS: ICD-10-CM

## 2020-06-10 DIAGNOSIS — F31.31 BIPOLAR 1 DISORDER, DEPRESSED, MILD (H): ICD-10-CM

## 2020-06-10 DIAGNOSIS — F43.23 ADJUSTMENT DISORDER WITH MIXED ANXIETY AND DEPRESSED MOOD: ICD-10-CM

## 2020-06-10 DIAGNOSIS — F43.10 PTSD (POST-TRAUMATIC STRESS DISORDER): ICD-10-CM

## 2020-06-10 DIAGNOSIS — F41.1 GAD (GENERALIZED ANXIETY DISORDER): ICD-10-CM

## 2020-06-10 DIAGNOSIS — F31.32 BIPOLAR 1 DISORDER, DEPRESSED, MODERATE (H): ICD-10-CM

## 2020-06-10 DIAGNOSIS — F17.210 CIGARETTE NICOTINE DEPENDENCE WITHOUT COMPLICATION: ICD-10-CM

## 2020-06-10 RX ORDER — DIVALPROEX SODIUM 500 MG/1
1500 TABLET, EXTENDED RELEASE ORAL AT BEDTIME
Qty: 90 TABLET | Refills: 5 | Status: SHIPPED | OUTPATIENT
Start: 2020-06-10 | End: 2022-04-13 | Stop reason: DRUGHIGH

## 2020-06-10 RX ORDER — VENLAFAXINE HYDROCHLORIDE 150 MG/1
150 CAPSULE, EXTENDED RELEASE ORAL DAILY
Qty: 30 CAPSULE | Refills: 5 | Status: SHIPPED | OUTPATIENT
Start: 2020-06-10 | End: 2022-04-13 | Stop reason: DRUGHIGH

## 2020-06-10 RX ORDER — TRAZODONE HYDROCHLORIDE 50 MG/1
50-100 TABLET, FILM COATED ORAL AT BEDTIME
Qty: 60 TABLET | Refills: 5 | Status: SHIPPED | OUTPATIENT
Start: 2020-06-10 | End: 2022-04-13

## 2020-06-13 ENCOUNTER — COMMUNICATION - HEALTHEAST (OUTPATIENT)
Dept: BEHAVIORAL HEALTH | Facility: CLINIC | Age: 45
End: 2020-06-13

## 2020-06-13 DIAGNOSIS — F31.31 BIPOLAR 1 DISORDER, DEPRESSED, MILD (H): ICD-10-CM

## 2020-06-21 ENCOUNTER — RECORDS - HEALTHEAST (OUTPATIENT)
Dept: LAB | Facility: CLINIC | Age: 45
End: 2020-06-21

## 2020-06-22 LAB
BACTERIA SPEC CULT: ABNORMAL
BACTERIA SPEC CULT: ABNORMAL
HIV 1+2 AB+HIV1 P24 AG SERPL QL IA: NEGATIVE

## 2020-06-23 LAB
C TRACH DNA SPEC QL PROBE+SIG AMP: NEGATIVE
HCV AB SERPL QL IA: NEGATIVE
N GONORRHOEA DNA SPEC QL NAA+PROBE: NEGATIVE
T PALLIDUM AB SER QL: NEGATIVE

## 2020-07-01 ENCOUNTER — COMMUNICATION - HEALTHEAST (OUTPATIENT)
Dept: BEHAVIORAL HEALTH | Facility: CLINIC | Age: 45
End: 2020-07-01

## 2020-08-07 DIAGNOSIS — Z11.59 SCREENING FOR VIRAL DISEASE: ICD-10-CM

## 2020-09-14 ENCOUNTER — COMMUNICATION - HEALTHEAST (OUTPATIENT)
Dept: BEHAVIORAL HEALTH | Facility: CLINIC | Age: 45
End: 2020-09-14

## 2020-11-23 ENCOUNTER — COMMUNICATION - HEALTHEAST (OUTPATIENT)
Dept: SCHEDULING | Facility: CLINIC | Age: 45
End: 2020-11-23

## 2021-05-28 NOTE — PROGRESS NOTES
MENTAL HEALTH VISIT NOTE    05/17/2019  Start time: 901 Stop Time: 958  Session # 18    Kandy Yañez is a 43 y.o. female being seen today for follow-up depression.    New symptoms or complaints: Patient reported struggling with 2 weeks of depression.     Present for session: Patient and therapist     Type of Session: Individual     Functional Impairment:   Personal: 3  Family: 3  Work: 2  Social:3    Mental statue: Patient presented on time for her scheduled session. Patient was pleasant and cooperative. She was oriented X4. She made appropriate eye contact. She was well dressed with good grooming and hygiene. Recent and remote memories were intact. Concentration and focus: Focused and on task. Speech (tone, volume, and rate) were intact. Mood and affect were congruently depressed but appropriate for the content of the session. Fund of knowledge was adequate. Thought process was logical and linear. Insight and judgment were intact. Reviewed and changes made as needed.     Suicidal/Homicidal Ideation present: None Reported This Session    Patient's impression of their current status: Patient indicated feeling depressed. Patient reported she had two weeks of deep depression. Patient indicated she never had suicidal thoughts just very depressed and stayed in bed. Patient reported she made excuses for not going to work and meetings. Patient indicated this week she was finally able to start going places again. Patient reported she is struggling with where she is at in life. Patient indicated now that tax season is over she is hardly getting any hours at work and needs to find a new job. Patient reported also struggling with what she wants in her relationship.     Therapist impression of patients current state: Patient appears to have fair insight into her mental health. This therapist processed with patient what she noticed with her depression symptoms. This therapist challenged patient what she wants from a job and  trying different jobs out. This therapist processed with patient starting to identify what she wants and needs from her relationship.    Progress toward short term goals: Progress as expected medication management with Dr. Betancourt, noticing mental health symptoms.     Review of long term goals: Treatment Plan Updated on 05/17/2019    Diagnosis:   1. Bipolar 1 disorder, depressed, mild (H)    2. JAYLIN (generalized anxiety disorder)    3. PTSD (post-traumatic stress disorder)      Plan and Follow up: Patient will return in one week for scheduled session. Patient should work on identifying what she wants from her relationship. Patient would benefit from starting to identify her interests and jobs that she feels would fit those interests.     Discharge Criteria/Planning: Patient will continue with follow-up until therapy can be discontinued without return of signs and symptoms.    Encounter performed and documented by FOREIGN Vieira

## 2021-05-28 NOTE — PROGRESS NOTES
Outpatient Mental Health Treatment Plan    Name:  Kandy Yañez  :  1975  MRN:  [id number]    Treatment Plan:  Updated Treatment Plan  Intake/initial treatment plan date:  2016  Benefit and risks and alternatives have been discussed: Yes  Is this treatment appropriate with minimal intrusion/restrictions: Yes  Estimated duration of treatment:  Ongoing therapy at this time  Anticipated frequency of services:  Weekly  Necessity for frequency: This frequency is needed to establish therapeutic goals and for continuity of care in order to monitor progress.  Necessity for treatment: To address cognitive, behavioral, and/or emotional barriers in order to work toward goals and to improve quality of life.    Plan:         ?   ? Anxiety    Goal:  Decrease average anxiety level from 4 to 2.   Strategies: ? [x]Learn and practice relaxation techniques and other coping strategies      ? [x] Increase involvement in meaningful activities     ? [x] Discuss sleep hygiene     ? [x] Explore thoughts and expectations about self and others     ? [x] Identify and monitor triggers for panic/anxiety symptoms     ? [x] Implement physical activity routine (with physician approval)     ? [x] Consider introduction of bibliotherapy and/or videos     ? [x] Continue compliance with medical treatment plan                                          Degree to which this is a problem (1-4): 4  Degree to which goal is met (1-4): 2    Date of next review: 2019     ? Depression    Goal:  Decrease average depression level from 4 to 1.   Strategies:    ?[x] Decrease social isolation     [x] Increase involvement in meaningful activities     ?[x] Discuss sleep hygiene     ?[x] Explore thoughts and expectations about self and others     ?[x] Assess for suicide risk     ?[x] Implement physical activity routine (with physician approval)     [x] Consider introduction of bibliotherapy and/or videos     [x] Continue compliance with medical  treatment plan      ?  Degree to which this is a problem (1-4): 4  Degree to which goal is met (1-4): 2    Date of next review: 08/17/2019    Substance use  Goal:  Maintain Sobriety .   Strategies: ? [] Consider referral for chemical dependency evaluation     ? [] Discuss barriers to participating in AA or other peer-facilitated groups         [x] Address environmental factors which may interfere with sobriety     ? [x] Explore short-term versus long-term consequences of use     ? [x] Continue compliance with medical treatment plan     Degree to which this is a problem (1-4): 2  Degree to which goal is met (1-4): 4     ?  Date of next review: 08/17/2019    PTSD   Goal: Identify triggers, separate the traumatic memory from the debilitating emotion associated with it.   Strategies:   [X]Learn and practice relaxation techniques and other coping strategies (e.g., thought stopping, reframing, meditation)   ? [X] Cognitive restructuring   ? [X] Discuss sleep hygiene   ? [X] Identify and change maladaptive coping behaviors   ? [X] Prolonged exposure therapy   ? [X] Identify cues and symptoms of PTSD     Degree to which this is a problem (1-4): 4  Degree to which goal is met (1-4): 2    Date of next review: 08/17/2019    Functional Impairment: 1=Not at all/Rarely  2=Some days  3=Most Days  4=Every Day   Personal: 3  Family: 3  Social: 4  Work: 4    Diagnosis:  bipolar disorder, mixed, severe without psychosis   generalized anxiety disorder   PTSD   cocaine use disorder    Clinical assessments completed: JAYLIN-7, PHQ-9, Mood Questionnaire current, CAGE-AID, PANSI.    Strengths: Patient denied any strengths at this time.    Limitations: Patient indicated her barriers to growth includes herself, drugs, men and money.     Cultural Identification: (Patient indicated the following:)   Race: White or    Experience of cultural bias: Patient denied ever experiencing any cultural bias or discrimination.   Impact of culture:  Patient reported her culture affected her by not having a relationship to look up to in life.   Immigration/history of status[place] in [place].   Level of acculturation: Acculturated   Time orientation: Middle and present focused   Verbal Communication Style/languages: Does best in English.   Locus of Control: Inward and outward      Persons responsible for this plan:  ? [x] Patient ? [x] Provider ? [x] Other: Dr. Betancoutr      Provider:Performed and documented by FOREIGN Vieira 5/17/2019    Date:  5/17/2019  Time:  12:10 PM        Patient Signature:____________________________________ Date: ______________     Guardian Signature: __________________________________ Date: ______________     Therapist Signature: __________________________________ Date: ______________

## 2021-05-30 ENCOUNTER — HEALTH MAINTENANCE LETTER (OUTPATIENT)
Age: 46
End: 2021-05-30

## 2021-05-30 ENCOUNTER — RECORDS - HEALTHEAST (OUTPATIENT)
Dept: ADMINISTRATIVE | Facility: CLINIC | Age: 46
End: 2021-05-30

## 2021-05-30 VITALS — HEIGHT: 65 IN | WEIGHT: 177 LBS | BODY MASS INDEX: 29.49 KG/M2

## 2021-05-30 VITALS — WEIGHT: 185 LBS | HEIGHT: 65 IN | BODY MASS INDEX: 30.82 KG/M2

## 2021-05-30 NOTE — TELEPHONE ENCOUNTER
Left voice message regarding missed appointment. Encouraged to call the Clinic and reschedule.

## 2021-05-31 VITALS — BODY MASS INDEX: 30.49 KG/M2 | HEIGHT: 65 IN | WEIGHT: 183 LBS

## 2021-05-31 VITALS — BODY MASS INDEX: 32.49 KG/M2 | HEIGHT: 65 IN | WEIGHT: 195 LBS

## 2021-05-31 VITALS — WEIGHT: 198.09 LBS | BODY MASS INDEX: 33 KG/M2 | HEIGHT: 65 IN

## 2021-05-31 VITALS — HEIGHT: 65 IN | BODY MASS INDEX: 30.49 KG/M2 | WEIGHT: 183 LBS

## 2021-05-31 NOTE — TELEPHONE ENCOUNTER
Date of Last Office Visit: 3/20/19  Date of Next Office Visit: 9/25/19  No shows since last visit: 6/26/19  Cancellations since last visit: no  ED visits since last visit: no    Medication Effexor  XR 75 date last ordered: 3/20/19  Qty: 90  Refills: 5    venlafaxine (EFFEXOR XR) 75 MG 24 hr capsule 90 capsule 5 3/20/2019     Sig - Route: Take 3 capsules (225 mg total) by mouth daily. - Oral        Lapse in therapy greater than 7 days: no  Medication refill request verified as identical to current order: yes  Result of Last DAM, VPA, Li+ Level, CBC, or Carbamazepine Level (at or since last visit):Depakote level on 12/19/18: 93.3  WBC, Platlets, and hepatic profile  - see under labs, last completed 12/18/19        []Eligibility - not seen in last year    []Supervision - no future appointment    [x]Compliance : N/S  6/26/19    []Verification - order discrepancy    []Controlled Medication    []90 - day supply request    [x]Other LPN pending medications    Current Medication list:    divalproex (DEPAKOTE ER) 500 MG 24 hour tablet Take 3 tablets (1,500 mg total) by mouth daily.   hydrOXYzine pamoate (VISTARIL) 50 MG capsule Take 1 capsule (50 mg total) by mouth 3 (three) times a day as needed for anxiety.   MULTIVITAMIN ORAL Take 1 tablet by mouth daily.    nicotine (NICODERM CQ) 21 mg/24 hr Place 1 patch on the skin daily.   nicotine (NICODERM CQ) 7 mg/24 hr Place 1 patch on the skin daily.   traZODone (DESYREL) 50 MG tablet Take 1-2 tablets ( mg total) by mouth at bedtime.   TRUVADA 200-300 mg per tablet Take 1 tablet by mouth daily.   venlafaxine (EFFEXOR XR) 75 MG 24 hr capsule Take 3 capsules (225 mg total) by mouth daily.         Medication Plan of Care at last office visit with MD/CNP:    PLAN:  divalproex (DEPAKOTE ER) 500 MG 24 hour tablet Take 3 tablets (1,500 mg total) by mouth daily.   hydrOXYzine pamoate (VISTARIL) 50 MG capsule Take 1 capsule (50 mg total) by mouth 3 (three) times a day as needed for  anxiety.   MULTIVITAMIN ORAL Take 1 tablet by mouth daily.    nicotine (NICODERM CQ) 21 mg/24 hr Place 1 patch on the skin daily.   nicotine (NICODERM CQ) 7 mg/24 hr Place 1 patch on the skin daily.   traZODone (DESYREL) 50 MG tablet Take 1-2 tablets ( mg total) by mouth at bedtime.   TRUVADA 200-300 mg per tablet Take 1 tablet by mouth daily.   venlafaxine (EFFEXOR XR) 75 MG 24 hr capsule Take 3 capsules (225 mg total) by mouth daily.         [place], [place], [place], and [place] : NA

## 2021-05-31 NOTE — PROGRESS NOTES
MENTAL HEALTH VISIT NOTE    08/15/2019  Start time: 1017 Stop Time: 1100  Session # 19    Kandy Yañez is a 43 y.o. female being seen today for follow-up depression.    New symptoms or complaints: Patient indicated struggling with unhealthy choices.     Present for session: Patient and therapist     Type of Session: Individual     Functional Impairment:   Personal: 3  Family: 3  Work: 2  Social:3    Mental statues: Patient presented late for her scheduled session. Patient was pleasant and cooperative. She was oriented X4. She made appropriate eye contact. She was well dressed with good grooming and hygiene. Recent and remote memories were intact. Concentration and focus: Focused and on task. Speech (tone, volume, and rate) were intact. Mood and affect were congruently anxious. Fund of knowledge was adequate. Thought process was logical and linear. Insight and judgment were intact. Reviewed and changes have been made as needed.     Suicidal/Homicidal Ideation present: None Reported This Session    Patient's impression of their current status: Patient reported feeling anxious. Patient reported a lot is going good in her life. Patient indicated she is working full time and enjoying it. Patient reported feeling her depression has been minimal over the last few months. Patient indicated her struggles have been with unhealthy choices. Patient reported with her second job she is not going a lot and not calling in. Patient indicated she also has started getting involved with multiple guys. Patient reported this had lead her to start thinking of using. Patient indicated one of them uses and knowing this is a trigger for her. Patient reported knowing she cannot use just a little but finding it hard to convince herself.     Therapist impression of patients current state: Patient appears to have fair insight into her mental health. This therapist challenged patient on ways she struggles with self- sabotage. This therapist  processed with patient playing the tape forward and how use has had many negative impacts on her. This therapist processed with patient ways being involved with multiple men can be a form of addiction which can lead to other addictions. This therapist processed with patient the importance of getting to meeting and talking with her sponsor.     Progress toward short term goals: Progress as expected medication management with Dr. Betancourt, noticing mental health symptoms.     Review of long term goals: Treatment Plan Updated on 08/15/40475    Diagnosis:   1. Bipolar 1 disorder, depressed, mild (H)    2. PTSD (post-traumatic stress disorder)    3. Cocaine dependence in remission (H)      Plan and Follow up: Patient will return in one week for scheduled session. Patient should work on identifying what she wants from her relationship. Patient needs to work on getting to meetings and talking with her sponsor. Patient would benefit from identifying ways being with certain men are triggering for her use and mental health.     Discharge Criteria/Planning: Patient will continue with follow-up until therapy can be discontinued without return of signs and symptoms.    Encounter performed and documented by FOREIGN Vieira

## 2021-05-31 NOTE — PROGRESS NOTES
Outpatient Mental Health Treatment Plan    Name:  Kandy Yañez  :  1975  MRN:  [id number]    Treatment Plan:  Updated Treatment Plan  Intake/initial treatment plan date:  2016  Benefit and risks and alternatives have been discussed: Yes  Is this treatment appropriate with minimal intrusion/restrictions: Yes  Estimated duration of treatment:  Ongoing therapy at this time  Anticipated frequency of services:  Bi-Weekly  Necessity for frequency: This frequency is needed to establish therapeutic goals and for continuity of care in order to monitor progress.  Necessity for treatment: To address cognitive, behavioral, and/or emotional barriers in order to work toward goals and to improve quality of life.    Plan:         ?   ? Anxiety    Goal:  Decrease average anxiety level from 4 to 2.   Strategies: ? [x]Learn and practice relaxation techniques and other coping strategies      ? [x] Increase involvement in meaningful activities     ? [x] Discuss sleep hygiene     ? [x] Explore thoughts and expectations about self and others     ? [x] Identify and monitor triggers for panic/anxiety symptoms     ? [x] Implement physical activity routine (with physician approval)     ? [x] Consider introduction of bibliotherapy and/or videos     ? [x] Continue compliance with medical treatment plan                                          Degree to which this is a problem (1-4): 4  Degree to which goal is met (1-4): 2    Date of next review: 11/15/2019     ? Depression    Goal:  Decrease average depression level from 4 to 1.   Strategies:    ?[x] Decrease social isolation     [x] Increase involvement in meaningful activities     ?[x] Discuss sleep hygiene     ?[x] Explore thoughts and expectations about self and others     ?[x] Assess for suicide risk     ?[x] Implement physical activity routine (with physician approval)     [x] Consider introduction of bibliotherapy and/or videos     [x] Continue compliance with medical  treatment plan      ?  Degree to which this is a problem (1-4): 4  Degree to which goal is met (1-4): 2    Date of next review: 11/15/2019    Substance use  Goal:  Maintain Sobriety .   Strategies: ? [] Consider referral for chemical dependency evaluation     ? [] Discuss barriers to participating in AA or other peer-facilitated groups         [x] Address environmental factors which may interfere with sobriety     ? [x] Explore short-term versus long-term consequences of use     ? [x] Continue compliance with medical treatment plan     Degree to which this is a problem (1-4): 3  Degree to which goal is met (1-4): 3     ?  Date of next review: 11/15/2019    PTSD   Goal: Identify triggers, separate the traumatic memory from the debilitating emotion associated with it.   Strategies:   [X]Learn and practice relaxation techniques and other coping strategies (e.g., thought stopping, reframing, meditation)   ? [X] Cognitive restructuring   ? [X] Discuss sleep hygiene   ? [X] Identify and change maladaptive coping behaviors   ? [X] Prolonged exposure therapy   ? [X] Identify cues and symptoms of PTSD     Degree to which this is a problem (1-4): 4  Degree to which goal is met (1-4): 3    Date of next review: 11/15/2019    Functional Impairment: 1=Not at all/Rarely  2=Some days  3=Most Days  4=Every Day   Personal: 3  Family: 3  Social: 4  Work: 4    Diagnosis:  bipolar disorder, mixed, severe without psychosis   generalized anxiety disorder   PTSD   cocaine use disorder    Clinical assessments completed: JAYLIN-7, PHQ-9, Mood Questionnaire current, CAGE-AID, PANSI.    Strengths: Patient denied any strengths at this time.    Limitations: Patient indicated her barriers to growth includes herself, drugs, men and money.     Cultural Identification: (Patient indicated the following:)   Race: White or    Experience of cultural bias: Patient denied ever experiencing any cultural bias or discrimination.   Impact of culture:  Patient reported her culture affected her by not having a relationship to look up to in life.   Immigration/history of status: Born [place] raised in [place].   Level of acculturation: Acculturated   Time orientation: Middle and present focused   Verbal Communication Style/languages: Does best in English.   Locus of Control: Inward and outward      Persons responsible for this plan:  ? [x] Patient ? [x] Provider ? [x] Other: Dr. Betancourt      Provider:Performed and documented by FOREIGN Vieira 8/15/2019    Date:  8/15/2019  Time:  12:10 PM        Patient Signature:____________________________________ Date: ______________     Guardian Signature: __________________________________ Date: ______________     Therapist Signature: __________________________________ Date: ______________

## 2021-06-01 ENCOUNTER — RECORDS - HEALTHEAST (OUTPATIENT)
Dept: ADMINISTRATIVE | Facility: CLINIC | Age: 46
End: 2021-06-01

## 2021-06-01 VITALS — BODY MASS INDEX: 33.49 KG/M2 | WEIGHT: 201 LBS | HEIGHT: 65 IN

## 2021-06-01 VITALS — WEIGHT: 207 LBS | BODY MASS INDEX: 34.49 KG/M2 | HEIGHT: 65 IN

## 2021-06-01 NOTE — PROGRESS NOTES
Discharge Summary    Session Type: Patient is presenting for an Individual session.    Dates of service 01/08/2019 to 09/25/2019     Diagnosis at Intake   Bipolar disorder, mixed, severe without psychosis   Generalized anxiety disorder   PTSD  Cocaine use disorder    Diagnosis at Discharge  Bipolar disorder, mixed, moderate  PTSD  Cocaine use disorder, in early remission.     Progress toward goal 1: Decrease average anxiety level from 4 to 2.  Met    Progress toward goal 2: Decrease average depression level from 3 to 1.  Partially Met    Additional goals: Maintain Sobriety .  Met    Reason for discharge:Pt dropped out     Prognosis at discharge:Guarded    Recommendations and referrals at discharge: Use referrals sent for one on one psychotherapy and continue medication management with Dr. Betancourt

## 2021-06-01 NOTE — TELEPHONE ENCOUNTER
Left voice message regarding missed appointment. Encouraged to call the Clinic and reschedule.

## 2021-06-02 VITALS — WEIGHT: 207.04 LBS | HEIGHT: 65 IN | BODY MASS INDEX: 34.49 KG/M2

## 2021-06-02 VITALS — BODY MASS INDEX: 34.16 KG/M2 | HEIGHT: 65 IN | WEIGHT: 205 LBS

## 2021-06-02 VITALS — WEIGHT: 207 LBS | HEIGHT: 65 IN | BODY MASS INDEX: 34.49 KG/M2

## 2021-06-02 NOTE — TELEPHONE ENCOUNTER
Date of Last Office Visit: 3/20/19  Date of Next Office Visit: None scheduled. Left message for patient to call back and schedule and appointment  No shows since last visit: 6/26/19, 9/25/19  Cancellations since last visit: none  ED visits since last visit:  none  Medication divalproex 500mg  date last ordered: 3/20/19  Qty: 90  Refills: 5  Lapse in therapy greater than 7 days: yes  Medication refill request verified as identical to current order: yes  Result of Last DAM, VPA, Li+ Level, CBC, or Carbamazepine Level (at or since last visit): N/A     [] Medication refilled per [facility] M-1.   [x] Medication unable to be refilled by RN due to criteria not met as indicated below:     []Eligibility - not seen in last year    [x]Supervision - no future appointment    [x]Compliance     []Verification - order discrepancy    []Controlled Medication    [x]Medication not included in RN Protocol    []90 - day supply request    []Other   Current Medication list:    divalproex (DEPAKOTE ER) 500 MG 24 hour tablet Take 3 tablets (1,500 mg total) by mouth daily.   hydrOXYzine pamoate (VISTARIL) 50 MG capsule Take 1 capsule (50 mg total) by mouth 3 (three) times a day as needed for anxiety.   MULTIVITAMIN ORAL Take 1 tablet by mouth daily.    nicotine (NICODERM CQ) 21 mg/24 hr Place 1 patch on the skin daily.   nicotine (NICODERM CQ) 7 mg/24 hr Place 1 patch on the skin daily.   traZODone (DESYREL) 50 MG tablet Take 1-2 tablets ( mg total) by mouth at bedtime.   TRUVADA 200-300 mg per tablet Take 1 tablet by mouth daily.   venlafaxine (EFFEXOR-XR) 75 MG 24 hr capsule TAKE 3 CAPSULES(225 MG) BY MOUTH DAILY       Medication Plan of Care at last office visit with MD/CNP:    1.Patient will take the medications as prescribed.      Psychiatric medications:  Start Nicotine patch  21 mg transdermal daily for 7 days, , then 14 mg for 7 day , then 7 mg daily for 7 days then stop.  Depakote ER  1500 mg every night  Effexor  mg every  morning   Trazodone 50 -100 mg every night as needed    Vistaril 50 mg 3 times  a day as needed for anxiety and sleep

## 2021-06-02 NOTE — TELEPHONE ENCOUNTER
Message left for Kandy that Dr. Betancourt filled medication for one month, but no further refills until she sees her in clinic.  Asked to call to make appointment and to also update the nursing staff on how she is doing, as we are worried about her.  Also to call if she is getting help somewhere else.

## 2021-06-02 NOTE — TELEPHONE ENCOUNTER
Date of Last Office Visit: 3/20/19  Date of Next Office Visit: 11/12/19 Scheduled on 10/15/19  No shows since last visit:  2 6/26/19 & 9/25/19  Cancellations since last visit: 0  ED visits since last visit:  None  Medication venlafaxine 75 mg 24 hr capsule date last ordered: 9/03/19  Qty: 90  Refills: 0  Lapse in therapy greater than 7 days: Yes  Medication refill request verified as identical to current order: Yes  Result of Last DAM, VPA, Li+ Level, CBC, or Carbamazepine Level (at or since last visit): NA     [] Medication refilled per [facility] M-1.   [] Medication unable to be refilled by RN due to criteria not met as indicated below:     []Eligibility - not seen in last year    []Supervision - no future appointment    [x]Compliance     []Verification - order discrepancy    []Controlled Medication    []Medication not included in RN Protocol    []90 - day supply request    [x]Other CMA pending medication refills    Current Medication list:    divalproex (DEPAKOTE ER) 500 MG 24 hour tablet TAKE 3 TABLETS(1500 MG) BY MOUTH DAILY   hydrOXYzine pamoate (VISTARIL) 50 MG capsule Take 1 capsule (50 mg total) by mouth 3 (three) times a day as needed for anxiety.   MULTIVITAMIN ORAL Take 1 tablet by mouth daily.    nicotine (NICODERM CQ) 21 mg/24 hr Place 1 patch on the skin daily.   nicotine (NICODERM CQ) 7 mg/24 hr Place 1 patch on the skin daily.   traZODone (DESYREL) 50 MG tablet Take 1-2 tablets ( mg total) by mouth at bedtime.   TRUVADA 200-300 mg per tablet Take 1 tablet by mouth daily.   venlafaxine (EFFEXOR-XR) 75 MG 24 hr capsule TAKE 3 CAPSULES(225 MG) BY MOUTH DAILY       Medication Plan of Care at last office visit with MD/CNP:    1.Patient will take the medications as prescribed.   Psychiatric medications:  Start Nicotine patch  21 mg transdermal daily for 7 days, , then 14 mg for 7 day , then 7 mg daily for 7 days then stop.  Depakote ER  1500 mg every night  Effexor  mg every morning   Trazodone 50  -100 mg every night as needed    Vistaril 50 mg 3 times  a day as needed for anxiety and sleep    Patient will not stop taking medications or adjust them without consulting with the provider.  2.Patient will call with any problems between 2 visits.  3.Patient will go to the emergency room if not feeling safe , unable to function in the community, or if suicidal, homicidal or hearing voices or having paranoia.  4.Patient will abstain from drugs and alcohol.  5.Patient will not drive if sedated on medications or under influence of any substance.   6.Patient will not mix psychiatric medications with drugs and alcohol.   7.Patient will watch his diet and exercise.  8.Patient will see non psychiatric providers for non psychiatric disorders.  9. Next appointment in 3 months.  10.Pregnancy protection /tubal ligation  11. Patient will continue to work with  .  12. Psychotherapy  with Jerri every month  13. Liver enzymes, platelet count, wbc and Depakote level for monitoring Depakote  Normal on 12/19/2018

## 2021-06-03 VITALS
WEIGHT: 192 LBS | HEIGHT: 65 IN | SYSTOLIC BLOOD PRESSURE: 120 MMHG | TEMPERATURE: 98.2 F | DIASTOLIC BLOOD PRESSURE: 77 MMHG | BODY MASS INDEX: 31.99 KG/M2 | HEART RATE: 68 BPM

## 2021-06-03 NOTE — PROGRESS NOTES
Pt is here for psychiatric med management follow up. Pt started a new job but lost her ins, had to wait 30 days until she got a new ins thru her work. She is only using 1 tab of Depakote and Effexor a day, takes Trazodone PRN ( only during menses). Pt wants to discuss her meds with provider today    : no records found    Correct pharmacy verified with patient and confirmed in snapshot? [x] yes []no    Charge captured ? [x] yes  [] no    Medications Phoned  to Pharmacy [] yes [x]no  Name of Pharmacist:  List Medications, including dose, quantity and instructions      Medication Prescriptions given to patient   [] yes  [x] no   List the name of the drug the prescription was written for.       Medications ordered this visit were e-scribed.  Verified by order class [x] yes  [] no  Depakote and Effexor  Medication changes or discontinuations were communicated to patient's pharmacy: [x] yes  [] no  Called [facility] and d/c'd all previous prescriptions for Depakote and Effexor except from today.  UA collected [] yes  [x] no    [place] Prescription Monitoring Program Reviewed? [x] yes  [] no    Referrals were made to:  none    Future appointment was made: [x] yes  [] no  2/12/20  Dictation completed at time of chart check: [] yes  [x] no    I have checked the documentation for today s encounters and the above information has been reviewed and completed.

## 2021-06-03 NOTE — PATIENT INSTRUCTIONS - HE
Psychiatric medications:  Increase Depakote ER  1000  mg every night  Increase Effexor   mg every morning   Trazodone 50 -100 mg every night as needed    Vistaril 50 mg 3 times  a day as needed for anxiety and sleep     Patient will not stop taking medications or adjust them without consulting with the provider.  2.Patient will call with any problems between 2 visits.  3.Patient will go to the emergency room if not feeling safe , unable to function in the community, or if suicidal, homicidal or hearing voices or having paranoia.  4.Patient will abstain from drugs and alcohol.  5.Patient will not drive if sedated on medications or under influence of any substance.   6.Patient will not mix psychiatric medications with drugs and alcohol.   7.Patient will watch his diet and exercise.  8.Patient will see non psychiatric providers for non psychiatric disorders.  9. Next appointment in 3 months.  10.Pregnancy protection /tubal ligation  11. Patient will continue to work with  .  12. Liver enzymes, platelet count, wbc and Depakote level for monitoring Depakote   13. Decrease one cigaret per month or every 2 weeks if you can tolerate it. Use Nicotine gum OTC.
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
## 2021-06-04 VITALS
WEIGHT: 180 LBS | BODY MASS INDEX: 29.99 KG/M2 | DIASTOLIC BLOOD PRESSURE: 76 MMHG | HEIGHT: 65 IN | HEART RATE: 64 BPM | SYSTOLIC BLOOD PRESSURE: 134 MMHG

## 2021-06-06 NOTE — TELEPHONE ENCOUNTER
Rx for Depakote already provided on 2/12/2020 for 3 mos, received by pharmacy, and ready for the pt to  - confirmed with [facility]. Please deny    divalproex (DEPAKOTE ER) 500 MG 24 hour tablet 60 tablet 2 2/12/2020  No   Sig - Route: Take 2 tablets (1,000 mg total) by mouth daily. - Oral

## 2021-06-06 NOTE — PROGRESS NOTES
Pt is here for psychiatric med management follow up. Kandy says she is doing well, back to exercising and eating better, med change works well. Pt said she will do her labs this week.    Correct pharmacy verified with patient and confirmed in snapshot? [x] yes []no    Charge captured ? [x] yes  [] no    Medications Phoned  to Pharmacy [] yes [x]no  Name of Pharmacist:  List Medications, including dose, quantity and instructions      Medication Prescriptions given to patient   [] yes  [x] no   List the name of the drug the prescription was written for.       Medications ordered this visit were e-scribed.  Verified by order class [x] yes  [] no  Depakote and Effexor  Medication changes or discontinuations were communicated to patient's pharmacy: [] yes  [x] no    UA collected [] yes  [x] no    [place] Prescription Monitoring Program Reviewed? [] yes  [x] no    Referrals were made to:  none    Future appointment was made: [x] yes  [] no  5/13/2020  Dictation completed at time of chart check: [x] yes  [] no    I have checked the documentation for today s encounters and the above information has been reviewed and completed.

## 2021-06-06 NOTE — PATIENT INSTRUCTIONS - HE
Psychiatric medications:  Depakote ER  1000  mg every night  Effexor   mg every morning   Trazodone 50 -100 mg every night as needed    Vistaril 50 mg 3 times  a day as needed for anxiety and sleep     Patient will not stop taking medications or adjust them without consulting with the provider.  2.Patient will call with any problems between 2 visits.  3.Patient will go to the emergency room if not feeling safe , unable to function in the community, or if suicidal, homicidal or hearing voices or having paranoia.  4.Patient will abstain from drugs and alcohol.  5.Patient will not drive if sedated on medications or under influence of any substance.   6.Patient will not mix psychiatric medications with drugs and alcohol.   7.Patient will watch his diet and exercise.  8.Patient will see non psychiatric providers for non psychiatric disorders.  9. Next appointment in 3 months.  10.Pregnancy protection /tubal ligation  11. Patient will continue to work with  .  12. Liver enzymes, platelet count, wbc and Depakote level for monitoring Depakote   13. Decrease one cigaret per month or every 2 weeks if you can tolerate it. Use Nicotine gum OTC. Pt  says she continues to smoke.
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
## 2021-06-08 NOTE — PROGRESS NOTES
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    Currently on probation through [facility] in [facility].   Theft, prostitution charges recently.     10. What obstacles exist to participating in treatment? (Time off work, childcare, funding, transportation, pending [facility]PC time, living situation)    No obstacles     Dimension VI Ratings   Recovery environment - The placing authority must use the criteria in Dimension VI to determine a client s recovery environment.   RISK DESCRIPTIONS - Severity rating: 3  Client is not engaged in structured, meaningful activity and the client's peers, family , significant other, and living environment are unsupportive, or there is significant criminal justice system involvement.    REASONS SEVERITY WAS ASSIGNED - (What support does the person have for making changes? What structure/stability does the person have in his or her daily life that willincrease the likelihood that changes can be sustained? What problems exist in the person s environment that will jeopardize getting/staying clean and sober?)     Client reports that she is currently unemployed and lives with her significant other and grandmother, reports helping grandmother around the  house. Client reports her significant other is supportive, but is also recently sober. Client reports she is currently on probation through the [facility].          Client Choice/Exceptions     Would you like services specific to language, age, gender, culture, Alevism preference, race, ethnicity, sexual orientation or disability?  no    If yes, specify:      What particular treatment choices and options would you like to have?  MICD outpatient    Do you have a preference for a particular treatment program?  [facility]       .    DSM-V Criteria for Substance Abuse  Instructions  Determine whether the client currently meets the criteria for a Substance Use Disorder using the diagnostic criteria in the DSM-V, pp. 481-589. Current means during the most recent 12 months outside a facility that controls access to substances.    Category of substance Severity ICD-10 Code/DSM V Code   []  Alcohol Use Disorder [] Mild  [] Moderate  [] Severe [] (F10.10) (305.00)  [] (F10.20) (303.90)  [] (F10.20) (303.90)   []  Cannabis Use Disorder [] Mild  [] Moderate  [] Severe [] (F12.10) (305.20)  [] (F12.20) (304.30)  [] (F12.20) (304.30)   [] Hallucinogen Use Disorder [] Mild  [] Moderate  [] Severe [] (F16.10) (305.30)  [] (F16.20) (304.50)  [] (F16.20) (304.50)   []  Inhalant Use Disorder [] Mild  [] Moderate  [] Severe [] (F18.10) (305.90)  [] (F18.20) (304.60)  [] (F18.20) (304.60)   []  Opioid Use Disorder [] Mild  [] Moderate  [] Severe [] (F11.10) (305.50)  [] (F11.20) (304.00)  [] (F11.20) (304.00)   []  Sedative, Hypnotic, or Anxiolytic Use Disorder [] Mild  [] Moderate  [] Severe [] (F13.10) (305.40)   [] (F13.20) (304.10)  [] (F13.20) (304.10)   [x]  Stimulant Related Disorders [] Mild  [] Moderate  [x] Severe [] (F15.10) (305.70) Amphetamine type substance  [] (F14.10) (305.60) Cocaine  [] (F15.10) (305.70) Other or unspecified stimulant  [] (F15.20) (304.40) Amphetamine type substance  [] (F14.20) (304.20)  Cocaine  [] (F15.20) (304.40) Other or unspecified stimulant  [] (F15.20) (304.40) Amphetamine type substance  [x] (F14.20) (304.20) Cocaine  [] (F15.20) (304.40) Other or unspecified stimulant   []  Tobacco use Disorder [] Mild  [] Moderate  [] Severe [] (Z72.0) (305.1)  [] (F17.200) (305.1)  [] (F17.200) (305.1)   []  Other (or unknown) Substance Use Disorder [] Mild  [] Moderate  [] Severe [] (F19.10) (305.90)  [] (F19.20) (304.90)  [] (F19.20) (304.90)       Suggested Level of Care Necessary for Recovery  []  Inpatient  []  Extended Care []  Residential [x]  Outpatient  []  None            Collateral Contact Summary   Number of contacts made:  2  Contact with referring person:  yes     If court related records were reviewed, summarize here:       [x]   Information from collateral contacts supported/largely agreed with information from the client and associated risk ratings.   []   Information from collateral contacts was significantly different from information from the client and lead to different risk ratings.      Summarize new information here:      Rule 25 Assessment Summary and Plan   's Recommendation    Client is assessed as meeting criteria for Cocaine Use Disorder, Severe (F14.20) (304.20).   [facility]       Collateral Contacts     Please duplicate this page for each contact.  If this includes information which is sensitive and not public, separate this page from the rest of the assessment before sharing.  Retain the page in the assessment file.   Name    Yoli Guillen Relationship    [facility]   Phone Number    [phone] Releases    yes       Information Provided:      1/13/17 11:40am LVM requesting call back.   1/17/17 9:55am LVM requesting call back.   Reports all UAs since 12/2916 have been negative  Reports substantial history of use, is requesting treatment, would prefer to be at [facility] due to other services.   Last  reported use 12/2/16.  Client has reported some abandonment issues  Client is participating in [employer], only deal with women involved in prostitution   Client is required to not use, follow all instructions of probation, attend meetings, attend [facility]IDE, mental health screening, chemical health eval.   Client will be on probation until 12/14/2018.   Fax: [phone]       Collateral Contacts     Name    Shwetha Johnson   Relationship    mother Phone Number    [phone] Releases    yes       Information Provided:      1/13/17 11:42am M requesting call back.     Collateral Contacts     Name     [facility]   Medical record   Relationship     provider Phone Number    Releases     [facility] provider      Information Provided:      Confirmed information provided by client.         Staff Name and Title:  JORGE Gore     Date:  1/11/2017  Time:  10:39 AM

## 2021-06-08 NOTE — PROGRESS NOTES
Weekly Progress Note:  Week of 2/6/17  Kandy Yañez  1975  [id number]      D) Pt attended 2 groups this week with 2 absences.  This was the patient's second week in OP MICD TX.  She was enrolled in this group about a year ago but she dropped out.  A) Staff facilitated groups and reviewed tx progress. R) Pt working on the following dimensions:  Dimension #1 - Withdrawal Potential - Risk 0, no change.   Dimension #2 - Biomedical - Risk 0, no concern.  Patient is managing chronic back pain, staff changed from Risk 1 to Risk 0 to reflect this.  Dimension #3 - Emotional/Behavioral/Cognitive - Risk 2 , moderate concern. Patient is managing bipolar disorder and continues to work on her PTSD symptoms in group and with PTSD therapist.  Dimension #4 - Treatment Acceptance/Resistance - Risk 0, no concern. No change.    Dimension #5 - Relapse Potential - Risk 2, moderate concern. No change.  Dimension #6 - Recovery Environment - Risk 3, serious concern. No change at this time.    T) Client educated on Dual Recovery Planning Client has completed 26 of 90 hours of program at this time. Projected discharge date is Spring 2017. Current discharge plan is to be determined closer to discharge.     China Flores [facility] Student       Weekly Educational Topics Date Education   1. Understanding Dual Diagnoses, week 1         Understanding Dual Diagnoses, week 2     2. Coping with Stress     3. Managing Difficult Feelings     4. Building Recovery Support     5. Managing Thinking Problems     6. Developing Assertive Communication Skills     7. The Art of Change     8. Relapse Prevention     9. Relationships/Boundaries     10. Grief and Loss 1/25/17 1/27/17    11. Strengths 1/30/17  1/31/17  2/1/17  2/3/17 The Anonymous People      Mindfulness   12. Dual Recovery Planning 2/6/17  2/10/17    Seeking Safety Unit every Tuesday     HIV Education     OT/RT every Wednesday for one hour

## 2021-06-08 NOTE — PROGRESS NOTES
MENTAL HEALTH VISIT NOTE    01/05/2017  Start time: 1000   Stop Time: 1045   Session # 2    Kandy Yañez is a 41 y.o. female being seen today for follow-up.    New symptoms or complaints: Patient indicated being triggered by her trauma.     Functional Impairment:   Personal: 4  Family: 4  Work: 4  Social:4    The patient was late for her scheduled session. Patient was pleasant and cooperative. She was oriented X4. She made appropriate eye contact. She was well dressed with good grooming and hygiene. Recent and remote memories were intact. Concentration and focus: Focused and on task. Speech (tone, volume, and rate) were intact. Mood and affect were congruently anxious but appropriate for the content of the session. Fund of knowledge was adequate. Thought process was logical and linear. Insight and judgment were intact.     Suicidal/Homicidal Ideation present: None Reported This Session    Patient's impression of their current status: Patient indicated doing good. Patient reported having two different situations that triggered her emotions related to her trauma. Patient talked about how both situations caught her off guard. Patient indicated getting through the situations by using her coping skills. Patient reported this showing her how much her trauma continues to affect her day by day. Patient indicated she has been spending a lot of time caring for her grandmother.     Therapist impression of patients current state: Patient appears to have fair insight into her mental health. This therapist processed with patient ways she was able to successfully use her coping skills. This therapist challenged patient on the importance of self-care.   Progress toward short term goals: Progress as expected medication management with Dr. Betancourt, using coping skills, attending PTSD Skills returning to scheduled session.    Review of long term goals: Not done at today's session    Diagnosis:   1. Bipolar 1 disorder, depressed, mild     2. PTSD (post-traumatic stress disorder)    3. JAYLIN (generalized anxiety disorder)    4. Cocaine dependence in early, early partial, sustained full, or sustained partial remission    5. Alcohol dependence in remission      Plan and Follow up: Patient will return in one week for scheduled session to continue working on reducing mental health symptoms.  Patient will work on continuing to maintain sobriety. Patient will attend the PTSD Skills Group. Patient will continue to process her trauma. Patient will benefit from using assertive communication and not jumping to conclusions. Patient will benefit from continuing to use coping skills taught in session.     Discharge Criteria/Planning: Patient will continue with follow-up until therapy can be discontinued without return of signs and symptoms.    Encounter performed and documented by FOREIGN Vieira

## 2021-06-08 NOTE — PROGRESS NOTES
PTSD Skills Group Psychotherapy   CPT CODE: 03845   Frequency: Once a Week   START TIME: 2:00 STOP TIME: 3:00   [facility]   Date: 02/02/2017  N= 4      Topic: Anger     Group Session #7      Subjective: The patient was seen today for a 60 minute PTSD Skills (Essential Skills for PTSD) session held at [facility] outpatient clinic.      Mental Status Exam:   The patient was on time for scheduled session. Patient was pleasant and cooperative. The patient was oriented X4. The patient made good eye contact. The patient was well dressed with good grooming and hygiene. Recent and remote memories were intact. Concentration and focus: normal and was able to stay on task. Tone, volume, and rate were intact. Mood and affect were congruently anxious but appropriate for the content of the session. Fund of knowledge was normal. Thought process was logical and linear. Insight and judgment were intact.      Objective:   Patient was able to participate and benefit from treatment as evidenced by her verbal expression and understanding of idea discussed. A cognitive behavioral modality was used.      New Symptoms or Complaints: The patient did not disclose any new symptoms to the undersigned.      Reason Patient is participating in the group: This session was necessary in order to teach the patient about PTSD sxs and how to manage them via new adaptive coping strategies.      Patient's response to current intervention: Patient was receptive of feedback given, supportive of other group members and appears to have benefited from the group process. No barriers to learning evidenced.      Progress toward short-term goals: The patient completed their homework.      Patient's Impressions: The patient indicated readiness to learn by the patient s choice to attend group.      Are there any new goals: There were no new goals brought to the undersigned s attention.      Patient education on the above topics  was provided during this session. The patient was given an opportunity to ask questions and participate in the group discussion. The patient exhibited individual understanding by asking relevant questions and making appropriate comments to other group members.      Last session's practice exercises were reviewed. The primary tasks for this session included understanding the physiology of anger, emotional experience of anger (anger is neither good nor bad), problem anger versus healthy anger, physical, emotional and social consequences of problem anger, assess your anger, physical arousal, actions, behaviors and thoughts.     A:      1. Bipolar 1 disorder, depressed, mild    2. PTSD (post-traumatic stress disorder)    3. JAYLIN (generalized anxiety disorder)    4. Cocaine dependence in early, early partial, sustained full, or sustained partial remission      Patient Denied SI, Plan and/or means.      Provider's Assessment: Patient evidenced the ability to understand how PTSD impacts a person s thoughts, feelings, and actions. The patient was active and participatory throughout group and participated in all group activities. The patient continues to be very appropriate for group therapy and appears to benefit from PTSD skills.      P: Continue Essential Skills for PTSD group to address PTSD sxs.      Follow-up: The patient also agreed to continue practicing the anger journal, progressive muscle relaxation, grounding, deep breathing, self-statements and daily diary card.      Discharge Criteria Planning: Patient reports that her sxs have decreased and the patient is ready for an evidenced based PTSD individual treatment (prolonged exposure for PTSD/CPT for PTSD).

## 2021-06-08 NOTE — PROGRESS NOTES
Addiction Services - Initial Services Plan      Name:  Kandy Yañez  :  1975       MRN:  [id number]     Goal Methods   1.  Acceptance of chemical dependency and mental illness as a disease. A.  Comply with all med/psych recommendations  B.  Complete preliminary interviews  C.  Attend all program functions  D.  Attend all individual counseling sessions  E.  Read all assigned literature  F.  Complete psychological testing as recommended  G.  Particpate in any necessary consultations     2.  Acceptance of my need and ability to change A.  Complete any assignments.  B.  Participate in conferences (P.O., , family)  C.  Participate in 12 Step/support groups  D.  Actively participate in treatment planning  E.  Complete all assignments given or recommended on Treatment Plan     3.  Acceptance of staff recommendations as a means to my recovery A.  Participate in all interviews for Continuum of Care Plans  B.  Familiarize self with recovery program and how this applies to your day-to-day behavior       Patient describes their immediate need:  Pt reports none.   Are there any immediate Safety Needs such as (physical, stability, mobility):  Pt reports none.    Immediate Health Needs and Plan:  Pt reports none.   Vulnerable Adult:  No    [x] Continue Current Medications for: mental health and physical health  [] Request Consult for:  [] Notify Attending Physician about:  [] Other:      Issues to be addressed in the first sessions:    Become acquainted with group norms and other group members.    Set up time to meet with primary counselor 1:1.     Patient strengths and needs:  Strengths: determined when clean, motivated     Needs: additional sober relationships and structure, relapse prevention coping skills.     Plan for patient for time between intake and completion of the treatment plan:  Remain abstinent from any illicit substance/alcohol use and start group on 17.   Participate in all treatment  activities and assignments.     Staff Members' Titles authorized to Initiate Services are:    Director of Behavioral Service    Clinical Director of Chemical Dependency    Primary Counselor    MI/VIRAL Corrales. Counselor        Nursing Staff    Vulnerable Adult Review  [x] Review of the facility Abuse Prevention plan was reviewed with the patient  [x] No individual abuse plan is necessary  [] In addition to the facility Abuse Prevention plan, an Individual Abuse Plan will be put in place    I understand these goals to be the Treatment Goals of the Program, and I agree to the stated Methods in attempting to accomplish these goals.    Patient Signature:  _________________________Date:  ___________________    Staff Name/Title:  JORGE Gore  Date:  1/24/2017  Time: 8:55 AM

## 2021-06-08 NOTE — PATIENT INSTRUCTIONS - HE
Psychiatric medications:  Increase Depakote ER  1500  mg every night  Effexor   mg every morning   Trazodone 50 -100 mg every night as needed    Vistaril 50 mg 3 times  a day as needed for anxiety and sleep     Patient will not stop taking medications or adjust them without consulting with the provider.  2.Patient will call with any problems between 2 visits.  3.Patient will go to the emergency room if not feeling safe , unable to function in the community, or if suicidal, homicidal or hearing voices or having paranoia.  4.Patient will abstain from drugs and alcohol.  5.Patient will not drive if sedated on medications or under influence of any substance.   6.Patient will not mix psychiatric medications with drugs and alcohol.   7.Patient will watch his diet and exercise.  8.Patient will see non psychiatric providers for non psychiatric disorders.  9. Next appointment in 4 weeks .  10.Pregnancy protection /tubal ligation  11. Patient will continue to work with  .  12. Depakote level in 1 week at am    13. Decrease one cigaret per month or every 2 weeks if you can tolerate it. Use Nicotine gum OTC. Pt  says she continues to smoke.
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
## 2021-06-08 NOTE — PROGRESS NOTES
Weekly Progress Note:  Week of 2/6/17  Kandy Yañez  1975  [id number]      D) Pt attended 2 groups  this week with 2 absences.  This was the patient's first week in OP MICD TX.  She was enrolled in this group about a year ago but she dropped out.  A) Staff facilitated groups and reviewed tx progress. Assessed for [facility]. R) No VAP needed at this time. Pt working on the following dimensions:  Dimension #1 - Withdrawal Potential - Risk 0, no concern. No change.  Dimension #2 - Biomedical - Risk 0, no concern. Staff lowered the patient's risk level from 1 to 0 at this time.  The patient's medical problems have not interfered with her ability to attend OP MICD TX.   Dimension #3 - Emotional/Behavioral/Cognitive - Risk 2 , moderate concern.     Dimension #4 - Treatment Acceptance/Resistance - Risk 0, no concern. The patient is required to participate in a [place] program called [employer]. It's location along with her PO's office put the patient back in to an area of her past use.  [facility] called and left a voicemail message with her PO to see if it may be possible for the patient to transfer to [facility] in [facility].   Dimension #5 - Relapse Potential - Risk 2, moderate concern. No change.  Dimension #6 - Recovery Environment - Risk 3, serious concern. No change at this time.  T) Client educated on Dual Recovery Planning Client has completed 26 of 90 hours of program at this time. Projected discharge date is Spring 2017. Current discharge plan is to be determined closer to discharge..     Yaa Cadena MSW, LICSW, LADC exempt       Weekly Educational Topics Date Education   1. Understanding Dual Diagnoses, week 1         Understanding Dual Diagnoses, week 2     2. Coping with Stress     3. Managing Difficult Feelings     4. Building Recovery Support     5. Managing Thinking Problems     6. Developing Assertive Communication Skills     7. The Art of Change     8. Relapse Prevention     9. Relationships/Boundaries      10. Grief and Loss 1/25/17 1/27/17    11. Strengths 1/30/17  1/31/17  2/1/17  2/3/17 The Anonymous People      Mindfulness   12. Dual Recovery Planning     Seeking Safety Unit every Tuesday     HIV Education     OT/RT every Wednesday for one hour

## 2021-06-08 NOTE — PROGRESS NOTES
[facility]   Mental Health and Addiction Care   [facility], [facility], and [facility]    [phone] or [phone]   Master Plan   Client Name:  Kandy Yañez    : [birth year]   Counselor:     Monica Garces       Title:   Dimension 1 Withdrawal Potential   Plan Date: 17     Diagnosis:   Risk level 0, no concern   Problem: Client is diagnosed with a serious cocaine use disorder.  She is in early remission with last use date 16.       Goal:  Begin Date:17  Target Date:17     Clean and sober with abstinence from substance use.                        Title:   Dimension 2 Biomedical  Does the patient use nicotine?  Y or N: Yes   Plan Date:   17   Diagnosis:   Risk level 1, minor concern   Problem: Patient has reported back problems that cause chronic pain.       Goal:  Begin Date:17  Target Date:17  She is referred to medical providers and encouraged to follow their recommendations.  Kandy will be given smoking cessation information.                    Title:   Dimension 3 Emotional/Behavioral/Cognitive Conditions and Complications   Plan Date:   17   Diagnosis:   Risk level 2, moderate concern   Problem:   She is diagnosed with bipolar I disorder, JAYLIN and PTSD.  Her symptoms have had a negative impact on all areas of her life.    Goal:  Begin Date:17  Target Date:17   Have stable mental health, skills building to manage symptoms with continued sobriety.         Method 1: Begin Date:17  Target Date:17  Date Completed:   /  /      In treatment client will learn skills that will help her manage her mental health symptoms including, deep breathing, grounding, visualization, and muscle relaxation.  Addtionally, she will learn the fundamental skills of CBT.      Method 2: Begin Date:17  Target Date:17  Date Completed:   /  /        She will be more aware of changes in her mood, attitude or behavior that could indicate a relapse with her  mental health disorders.  She will create a plan to address issues to avoid a relapse.      Method 3: Begin Date:1/24/17  Target Date:5/24/17  Date Completed:   /  /       She is will continue her individual and group work for PTSD.            Title:   Dimension 4 Treatment Acceptance/Resistance   Plan Date:   2/2/17   Diagnosis:   Risk level 0, no concern   Problem:   The patient is willing to make changes but there is legal involvement.     Goal:  Begin Date:1/24/17  Target Date:5/24/17  She will complete treatment and make progress on her personal goals which are, making additional sober relationships, creating structure in her life and increasing her relapse prevention skills.                   Method 1: Begin Date:1/24/17  Target Date:5/24/17  Client will attend all outpatient MICD groups and will contact staff if she will be absent.           Method 2: Begin Date:1/24/17  Target Date:5/24/17  Kandy will work with staff to modify treatment plan to better meet her goals.                Title:   Dimension 5 Relapse/Continued Use/Continued Problem Potential   Plan Date: 2/2/17     Diagnosis:   Risk level 2, moderate concern   Problem:   She has managed sobriety at times but no long term sobriety in recent years.  Her history is using with a significant other and current S.O. Is in early recovery.     Goal:  Begin Date: 1/24/17 Target Date:  5/24/17 Date Completed:   /  /      Maintain long term sobriety by learning relapse prevention skills.  These include creating social connections, becoming aware of her motivation, gaining confidence in her ability to use skills to cope with managing urges and addictive thinking.               Method 1: Begin Date:1/24/17  Target Date:5/24/17  Attend recovery meetings 2-3 times per week to increase her sober social connections.  Explore different types of groups and philosophies about recovery including Narcotics Anonymous, Health Realization, and Women for Sobriety.          Method 2: Begin Date:1/24/17  Target Date:5/24/17  Client will begin to increase awareness of what motivates her to maintain sobriety when faced with adversity or if he S.O. Should relapse.            Method 3: Begin Date:1/24/17  Target Date:5/24/17  She will learn and use coping skills to manage urges and addictive thinking these could include, distraction, deep breathing, grounding and using the fundamental skills of CBT.                  Title:   Dimension 6 Recovery Environment   Plan Date:2/2/17      Diagnosis:   Risk level 3, serious concern   Problem:   Client lives with her elderly grandmother and her significant other who is also in early recovery.  Client has a history of co-dependent relationships which have contributed to her substance use and relapses.     Goal:  Begin Date:1/24/17  Target Date:5/24/17  Create a living environment that is conducive to long term recovery and she will meet her legal obligations.                   Method 1: Begin Date:1/24/17  Target Date:5/24/17  Client will learn about the characteristics of a healthy romantic relationship.  She will complete the workbook on Relationships and Communication and review with counselor.          Method 2: Begin Date:1/24/17  Target Date:5/24/17      She will create a timeline of her relationship history and the outcomes of them and review with her counselor.      Method 3: Begin Date:1/24/17  Target Date:5/24/17  Meet all of her legal requirements.                   Client Signature___________________________________    Date__________________        Staff Signature_________________________________________           Date__________________

## 2021-06-08 NOTE — PROGRESS NOTES
MENTAL HEALTH VISIT NOTE    01/23/2017  Start time: 1200   Stop Time: 1245   Session # 4    Kandy Yañez is a 41 y.o. female being seen today for follow-up.    New symptoms or complaints: None    Functional Impairment:   Personal: 4  Family: 4  Work: 4  Social:4    The patient was late for her scheduled session. Patient was pleasant and cooperative. She was oriented X4. She made appropriate eye contact. She was well dressed with good grooming and hygiene. Recent and remote memories were intact. Concentration and focus: Focused and on task. Speech (tone, volume, and rate) were intact. Mood and affect were congruently calm but appropriate for the content of the session. Fund of knowledge was adequate. Thought process was logical and linear. Insight and judgment were intact.     Suicidal/Homicidal Ideation present: None Reported This Session    Patient's impression of their current status: Patient indicated doing good. Patient reported continuing to stay on track. Patient talked about ways she has struggled with self-sabotage. Patient indicated having many goals yet finding excuses as to why she can not reach these goals. Patient talked about making a choice this week that put her sobriety at risk yet was able to stay sober and realize the danger she put herself into.     Therapist impression of patients current state: Patient appears to have fair insight into her mental health. This therapist processed with patient ways she continues to struggle with self-worth. This therapist challenged patient on ways she can start to challenge negative thoughts about herself.     Progress toward short term goals: Progress as expected medication management with Dr. Betancourt, using coping skills, attending PTSD Skills returning to scheduled session.    Review of long term goals: Not done at today's session    Diagnosis:   1. Bipolar 1 disorder, depressed, mild    2. PTSD (post-traumatic stress disorder)    3. JAYLIN (generalized anxiety  disorder)    4. Cocaine dependence in early, early partial, sustained full, or sustained partial remission      Plan and Follow up: Patient will return in one week for scheduled session to continue working on reducing mental health symptoms.  Patient will work on continuing to maintain sobriety. Patient will attend the PTSD Skills Group. Patient will continue to process her trauma. Patient will benefit from using assertive communication and not jumping to conclusions. Patient will benefit from continuing to use coping skills taught in session. Patient will benefit from self-care.     Discharge Criteria/Planning: Patient will continue with follow-up until therapy can be discontinued without return of signs and symptoms.    Encounter performed and documented by FOREIGN Vieira

## 2021-06-08 NOTE — PROGRESS NOTES
Weekly Progress Note:  Week of 1/23/17  Kandy Yañez  1975  [id number]      D) Pt attended 2 groups  this week with 0 absences.  This was the patient's first week in OP [facility] [place].  She was enrolled in this group about a year ago but she dropped out.  A) Staff facilitated groups and reviewed tx progress. Assessed for [facility]. R) No VAP needed at this time. Pt working on the following dimensions:  Dimension #1 - Withdrawal Potential - Risk 0, no concern. The patient reports no symptoms of withdrawal and none are observed..  Dimension #2 - Biomedical - Risk 1, minor concern.. The patient has reported having sciatic pain that is problematic at times.  Dimension #3 - Emotional/Behavioral/Cognitive - Risk 2, moderate concern. The patient is diagnosed with Bipolar disorder and anxiety.  She has established mental health care at this facility and she reports that she is medication compliant.  Dimension #4 - Treatment Acceptance/Resistance - Risk 0, no concern. The patient is agreeable about attending [place].  There is legal involvement with a [place] program called [facility]  Dimension #5 - Relapse Potential - Risk 2, moderate concern. The patient reports no substance use in about two months.   Dimension #6 - Recovery Environment - Risk 3, serious concern. The patient is living with her grandmother and her SO.  Her SO is also in early recovery.  The patient is helping to care for her elderly grandmother.  T) Client educated on Grief and Loss. Client has completed 6 of 90 hours of program at this time. Projected discharge date is Spring 2017. Current discharge plan is to be determined closer to discharge..     Yaa Cadena MSW, LICSW, LADC exempt       Weekly Educational Topics Date Education   1. Understanding Dual Diagnoses, week 1         Understanding Dual Diagnoses, week 2     2. Coping with Stress     3. Managing Difficult Feelings     4. Building Recovery Support     5. Managing Thinking Problems     6. Developing  Assertive Communication Skills     7. The Art of Change     8. Relapse Prevention     9. Relationships/Boundaries     10. Grief and Loss 1/25/17 1/27/17    11. Strengths     12. Dual Recovery Planning     Seeking Safety Unit every Tuesday     HIV Education     OT/RT every Wednesday for one hour

## 2021-06-08 NOTE — PROGRESS NOTES
PTSD Skills Group Psychotherapy   CPT CODE: 44953   Frequency: Once a Week   START TIME: 11:00 STOP TIME: 12:00   [facility]   Date: 01/05/2017    N= 4    Topic: Skills for Emotional Numbing     Group Session #5     Subjective: The patient was seen today for a 60 minute PTSD Skills (Essential Skills for PTSD) session held at [facility] outpatient clinic.     Mental Status Exam:   The patient was on time for her scheduled session. Patient was pleasant and cooperative. The patient was oriented X4. The patient made good eye contact. The patient was well dressed with good grooming and hygiene. Recent and remote memories were intact. Concentration and focus: normal and she was able to stay on task. Tone, volume, and rate were intact. Mood and affect were congruently anxious but appropriate for the content of the session. Fund of knowledge was normal. Thought process was logical and linear. Insight and judgment were intact.     Objective:   Patient was able to participate and benefit from treatment as evidenced by her verbal expression and understanding of idea discussed. A cognitive behavioral modality was used.     New Symptoms or Complaints: The patient did not disclose any new symptoms to the undersigned.     Reason Patient is participating in the group: This session was necessary in order to teach the patient about PTSD sxs and how to manage them via new adaptive coping strategies.     Patient's response to current intervention: Patient was receptive of feedback given, supportive of other group members and appears to have benefited from the group process. No barriers to learning evidenced.     Progress toward short-term goals: The patient completed homework.     Patient's Impressions: The patient indicated readiness to learn by the patient s choice to attend group.     Are there any new goals: There were no new goals brought to the undersigned s attention.   Patient education on the  above topics was provided during this session. The patient was given an opportunity to ask questions and participate in the group discussion. The patient exhibited individual understanding by asking relevant questions and making appropriate comments to other group members.   Last session's practice exercises were reviewed. The primary tasks for this session included understanding the origins of emotional numbing, learning techniques for identifying feelings (the link between one s physiological state and emotions, faces and feelings, and communication tactics), increasing assertive communication skills (comparison and contrast between aggressive, assertive and passive forms of communications), and assignment of homework.       Diagnoses:   1. PTSD (post-traumatic stress disorder)    2. Bipolar 1 disorder, depressed, mild    3. JAYLIN (generalized anxiety disorder)    4. Cocaine dependence, continuous    5. Alcohol dependence, continuous        Patient Denied SI, Plan and/or means.     Provider's Assessment: Patient evidenced the ability to understand how PTSD impacts a person s thoughts, feelings, and actions. The patient was active and participatory throughout group and participated in all group activities. The patient continues to be very appropriate for group therapy and appears to benefit from PTSD skills.     P: Continue Essential Skills for PTSD group to address PTSD sxs.     Follow-up: The patient also agreed to continue practicing the identification of his emotions and assertively communicating his feelings to others, using the  I  statement worksheet, and monitor daily symptoms using the daily diary card.     Discharge Criteria Planning: Patient reports that her sxs have decreased and the patient is ready for an evidenced based PTSD individual treatment (prolonged exposure for PTSD/CPT for PTSD).    Encounter documented by Jerri Chanel MA, [facility]

## 2021-06-08 NOTE — PROGRESS NOTES
Client experienced significant symptoms of PTSD in group today.  Client stated she felt triggered by the higher number of group participants today and because she did not get her normal seat which faces the two doorways.  Client became overwrought and was on the verge of hyperventilating.  She sat in the hallway with this staff, practiced deep breathing and processed the event.  She was able to rejoin the group and participate in group activities once the symptoms subsided.    China Flores, [facility] [place] [facility]

## 2021-06-08 NOTE — PROGRESS NOTES
PTSD Skills Psychotherapy   Frequency: Once a Week   START TIME: 200 STOP TIME: 3:00   Facilitator:  Jerri Chanel [facility] and SUSHANT Andrade, [facility]  Date: 01/19/2017  N= 5    Topic: Emotional Regulation and Upper Limits   Session #6    Subjective: The patient was seen today for a 60 minute PTSD Skills (Essential Skills for PTSD) session held at [facility] outpatient clinic.     Mental Status Exam:   The patient was on time for their scheduled session. Patient was pleasant and cooperative. The patient was oriented X4. The patient made good eye contact. The patient was well dressed with good grooming and hygiene. Recent and remote memories were intact. Concentration and focus: normal and she was able to stay on task. Tone, volume, and rate were intact. Mood and affect were congruently anxious but appropriate for the content of the session. Fund of knowledge was normal. Thought process was logical and linear. Insight and judgment were intact.     Objective:   Patient was able to participate and benefit from treatment as evidenced by their verbal expression and understanding of idea discussed. A cognitive behavioral modality was used.     New Symptoms or Complaints: The patient did not disclose any new symptoms to the undersigned.     Reason Patient is participating in the group: This session was necessary in order to teach the patient about PTSD sxs and how to manage them via new adaptive coping strategies.     Patient's response to current intervention: Patient was receptive of feedback given. No barriers to learning evidenced.     Progress toward short-term goals: The patient completed their homework.     Patient's Impressions: The patient indicated readiness to learn by the patient s choice to attend session.     Are there any new goals: There were no new goals brought to the undersigned s attention.     Patient education on the above topics was provided during this session. The patient was given an  opportunity to ask questions and participate in the discussion. The patient exhibited individual understanding by asking relevant questions and making appropriate comments.    Last session's practice exercises were reviewed. The primary tasks for this session included understanding emotional regulations and upper limits, ways depression and PTSD interact together, feeling your feelings safely, mindfulness and assignment of homework.     A:     1. PTSD (post-traumatic stress disorder)    2. Bipolar 1 disorder, depressed, mild    3. Cocaine dependence in early, early partial, sustained full, or sustained partial remission                   Patient Denied SI, Plan and/or means.     Provider's Assessment: Patient evidenced the ability to understand how PTSD impacts a person s thoughts, feelings, and actions. The patient was active and participatory throughout group and participated in all group activities. The patient continues to be very appropriate for group therapy and appears to benefit from PTSD skills.     P: Continue Essential Skills for PTSD to address PTSD sxs.     Follow-up: The patient also agreed to continue practicing the identification of her upper limits by identifying what might change if she starts to feel better and could she tolerate having good things happen in her life.     Discharge Criteria Planning: Patient reports that her sxs have decreased and the patient is ready for an evidenced based PTSD individual treatment (prolonged exposure for PTSD/CPT for PTSD).

## 2021-06-08 NOTE — PROGRESS NOTES
MENTAL HEALTH VISIT NOTE    01/23/2017  Start time: 1000   Stop Time: 1045   Session # 3    Kandy Yañez is a 41 y.o. female being seen today for follow-up.    New symptoms or complaints: None    Functional Impairment:   Personal: 4  Family: 4  Work: 4  Social:4    The patient was late for her scheduled session. Patient was pleasant and cooperative. She was oriented X4. She made appropriate eye contact. She was well dressed with good grooming and hygiene. Recent and remote memories were intact. Concentration and focus: Focused and on task. Speech (tone, volume, and rate) were intact. Mood and affect were congruently anxious but appropriate for the content of the session. Fund of knowledge was adequate. Thought process was logical and linear. Insight and judgment were intact.     Suicidal/Homicidal Ideation present: None Reported This Session    Patient's impression of their current status: Patient reported doing okay. Patient indicated having a situation where she wanted to avoid her emotions yet choice to feel them. Patient talked about the struggles she encounters with feeling her emotions due to numbing them for so long. Patient indicated being a caregiver and forgetting to care for herself. Patient talked about ways numbing her feeling have resulted in negative consequences.     Therapist impression of patients current state: Patient appears to have fair insight into her mental health. This therapist challenged patient on ways she neglects her own self-care. This therapist processed with patient the importance of felling and identifying her emotions on a regular basis.     Progress toward short term goals: Progress as expected medication management with Dr. Betancourt, using coping skills, attending PTSD Skills returning to scheduled session.    Review of long term goals: Not done at today's session    Diagnosis:   1. Bipolar 1 disorder, depressed, mild    2. PTSD (post-traumatic stress disorder)    3. JAYLIN  (generalized anxiety disorder)    4. Cocaine dependence in early, early partial, sustained full, or sustained partial remission    5. Alcohol dependence in remission      Plan and Follow up: Patient will return in one week for scheduled session to continue working on reducing mental health symptoms.  Patient will work on continuing to maintain sobriety. Patient will attend the PTSD Skills Group. Patient will continue to process her trauma. Patient will benefit from using assertive communication and not jumping to conclusions. Patient will benefit from continuing to use coping skills taught in session. Patient will benefit from self-care.     Discharge Criteria/Planning: Patient will continue with follow-up until therapy can be discontinued without return of signs and symptoms.    Encounter performed and documented by FOREIGN Vieira

## 2021-06-08 NOTE — PROGRESS NOTES
MENTAL HEALTH VISIT NOTE    01/05/2017  Start time: 100    Stop Time: 145   Session # 1    Kandy Yañez is a 41 y.o. female being seen today for follow-up.    New symptoms or complaints: None    Functional Impairment:   Personal: 4  Family: 4  Work: 4  Social:4    The patient was late for her scheduled session. Patient was pleasant and cooperative. She was oriented X4. She made appropriate eye contact. She was well dressed with good grooming and hygiene. Recent and remote memories were intact. Concentration and focus: Focused and on task. Speech (tone, volume, and rate) were intact. Mood and affect were congruently anxious but appropriate for the content of the session. Fund of knowledge was adequate. Thought process was logical and linear. Insight and judgment were intact.     Suicidal/Homicidal Ideation present: None Reported This Session    Patient's impression of their current status: Patient reported doing okay. Patient indicated she is continuing to stay on the positive track. Patient talked about ways she is continuing to struggle due to her trauma. Patient indicated it being hard to be alone. Patient talked about working on setting healthy boundaries with people in her life.     Therapist impression of patients current state: Patient appears to have fair insight into her mental health. This therapist processed with patient ways to work on the triggers related to her trauma. This therapist challenged patient on starting to use assertive communication to get her needs met.     Progress toward short term goals: Progress as expected medication management with Dr. Betancourt, using coping skills returning to scheduled session.    Review of long term goals: Not done at today's session    Diagnosis:   1. PTSD (post-traumatic stress disorder)    2. Bipolar 1 disorder, depressed, mild    3. JAYLIN (generalized anxiety disorder)    4. Cocaine dependence, continuous      Plan and Follow up: Patient will return in one week  for scheduled session to continue working on reducing mental health symptoms.  Patient will work on continuing to maintain sobriety. Patient will attend the PTSD Skills Group. Patient will continue to process her trauma. Patient will benefit from using assertive communication and not jumping to conclusions.     Discharge Criteria/Planning: Patient will continue with follow-up until therapy can be discontinued without return of signs and symptoms.    Encounter performed and documented by FOREIGN Vieira

## 2021-06-08 NOTE — PROGRESS NOTES
Weekly Progress Note:  Week of 1/30/17  Kandy Yañez  1975  [id number]      D) Pt attended 4 groups  this week with 0 absences.  This was the patient's first week in OP [facility] [place].  She was enrolled in this group about a year ago but she dropped out.  A) Staff facilitated groups and reviewed tx progress. Assessed for VA. R) No VAP needed at this time. Pt working on the following dimensions:  Dimension #1 - Withdrawal Potential - Risk 0, no concern. The patient reports no symptoms of withdrawal and none are observed..  Dimension #2 - Biomedical - Risk 1, minor concern.. The patient has reported having sciatic pain that is problematic at times.  Dimension #3 - Emotional/Behavioral/Cognitive - Risk 2 , moderate concern. The patient met with intern, Meghana CRAFT and colleague Shakira for a 1:1 appointment this week.  The patient's bipolar disorder is being well managed at this time and she is also working with an individual therapist on PTSD issues.  One pattern appears to be especially problematic and that is the patient's relationships with men.  There appears to be a pattern of co-dependence, which is not healthy, and her current SO is also in early recovery.  Each is dependent on the other's success in recovery.    Dimension #4 - Treatment Acceptance/Resistance - Risk 0, no concern. The patient is required to participate in a [place] program called [facility]. It's location along with her PO's office put the patient back in to an area of her past use.  [facility] called and left a voicemail message with her PO to see if it may be possible for the patient to transfer to [facility] in [facility].   Dimension #5 - Relapse Potential - Risk 2, moderate concern. No change.  Dimension #6 - Recovery Environment - Risk 3, serious concern. No change at this time.  T) Client educated on Strengths Client has completed 20 of 90 hours of program at this time. Projected discharge date is Spring 2017. Current discharge plan is to be determined  closer to discharge..     Yaa Cadena MSW, LICSW, LADC exempt       Weekly Educational Topics Date Education   1. Understanding Dual Diagnoses, week 1         Understanding Dual Diagnoses, week 2     2. Coping with Stress     3. Managing Difficult Feelings     4. Building Recovery Support     5. Managing Thinking Problems     6. Developing Assertive Communication Skills     7. The Art of Change     8. Relapse Prevention     9. Relationships/Boundaries     10. Grief and Loss 1/25/17 1/27/17    11. Strengths 1/30/17  1/31/17  2/1/17  2/3/17 The Anonymous People      Mindfulness   12. Dual Recovery Planning     Seeking Safety Unit every Tuesday     HIV Education     OT/RT every Wednesday for one hour

## 2021-06-08 NOTE — PROGRESS NOTES
"  D)Met with client this date for 1 to 1 counseling session. A)In attendance were JORGE Garces Intern and Kandy Mayer, client R) Met with client to review [facility] outpatient treatment plan.  Intern gave client information on smoking cessation, \"Quit Plan\" and workbook assignments on relationships/communication and Anger.  Additional homework given is \"relationship timeline\" to increase client awareness about dysfunctional patterns in romantic relationships. Client stated she is ready to quit smoking and plans to fill nicotine patch prescription today. Discussed additional resources for applying for social security disability insurance. Client stated she has twice weekly UA tests at hospital. She reaffirmed that PTSD work with Jenae Backer is quite helpful at this time. [facility] signed tx plan and agreed to review and update as needed. T) signed treatment plan faxed to [facility] , Yoli Guillen today.      JORGE Garces intern  "

## 2021-06-08 NOTE — PROGRESS NOTES
MENTAL HEALTH VISIT NOTE    02/13/2017  Start time: 1200   Stop Time: 1245   Session # 5    Kandy Yañez is a 41 y.o. female being seen today for follow-up.    New symptoms or complaints: Patient reported having a break-down.     Functional Impairment:   Personal: 4  Family: 4  Work: 4  Social:4    The patient was late for her scheduled session. Patient was pleasant and cooperative. She was oriented X4. She made appropriate eye contact. She was well dressed with good grooming and hygiene. Recent and remote memories were intact. Concentration and focus: Focused and on task. Speech (tone, volume, and rate) were intact. Mood and affect were congruently anxious but appropriate for the content of the session. Fund of knowledge was adequate. Thought process was logical and linear. Insight and judgment were intact.     Suicidal/Homicidal Ideation present: None Reported This Session    Patient's impression of their current status: Patient reported being anxious. Patient indicated getting to group late and sitting in a different spot than usual. Patient talked about how she felt uncomfortable and ways it triggered her PTSD. Patient indicated having a panic attack and feeling very scared. Patient reported now being worried it will happen again and she will not have control.      Therapist impression of patients current state: Patient appears to have fair insight into her mental health. This therapist challenged patient on confronting her PTSD. This therapist processed with patient ways she has tried to avoid her emotions.     Progress toward short term goals: Progress as expected medication management with Dr. Betancourt, using coping skills, attending PTSD Skills returning to scheduled session.    Review of long term goals: Not done at today's session    Diagnosis:   1. Bipolar 1 disorder, depressed, mild    2. PTSD (post-traumatic stress disorder)    3. JAYLIN (generalized anxiety disorder)      Plan and Follow up: Patient will  return in one week for scheduled session to continue working on reducing mental health symptoms.  Patient will work on continuing to maintain sobriety. Patient will attend the PTSD Skills Group. Patient will continue to process her trauma. Patient will benefit from using assertive communication and not jumping to conclusions. Patient will benefit from continuing to use coping skills taught in session. Patient will benefit from self-care.     Discharge Criteria/Planning: Patient will continue with follow-up until therapy can be discontinued without return of signs and symptoms.    Encounter performed and documented by FOREIGN Vieira

## 2021-06-08 NOTE — PROGRESS NOTES
This video/telephone visit will be conducted via a call between you and your physician/provider. We have found that certain health care needs can be provided without the need for an in-person physical exam. This service lets us provide the care you need with a video /telephone conversation. If a prescription is necessary we can send it directly to your pharmacy. If lab work is needed we can place an order for that and you can then stop by our lab to have the test done at a later time.    Just as we bill insurance for in-person visits, we also bill insurance for video/telephone visits. If you have questions about your insurance coverage, we recommend that you speak with your insurance company.    Patient has given verbal consent for video/Telephone visit? yes  Patient would like the video visit invitation sent by: Text to cell phone: QUENTIN or Send to email: QUENTIN DAS/YAMILETH : AD, LPN    Patient verified allergies, medications and pharmacy via phone. Patient states she  is ready for visit.    Pt is asking for Trazodone refill and wants to discuss the Depakote dosage.   : no data found for this patient    ________________________________________  Medications Phoned  to Pharmacy [] yes [x]no  Name of Pharmacist:  List Medications, including dose, quantity and instructions    Medications ordered this visit were e-scribed.  Verified by order class [] yes  [x] no   Depakote, Trazodone, and Effexor  Medication changes or discontinuations were communicated to patient's pharmacy: [x] yes  [] no   Called [facility] and updated of the Depakote dose increase  Dictation completed at time of chart check: [] yes  [x] no    I have checked the documentation for today s encounters and the above information has been reviewed and completed.
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
## 2021-06-08 NOTE — PROGRESS NOTES
D) Kandy is a 41 y.o. y.o. White or  female who is referred to MICD outpatient via [facility]  with funding from [employer].  Patient orientated x3.  Currently meets criteria for   1. Cocaine use disorder, severe, dependence    Patient appears appropriate for MICD at this time.    A) Completed updated intake assessment; preliminary paperwork; presented DAANES, ROIs, grievance procedure, Vulnerable Adult (VA) policy, TB & HIV/AIDS info and resources, confidentiality & HIPAA policies, Facility Abuse Prevention Plan, group rules/expectations, Patient Bill of Rights, counselor & supervisor license number and contact info, and PANSI.  Patient given statement of patient rights & responsibilities.  Conducted VA Assessment.    R) No special VA plan needed at this time.  PANSI score:  Low risk.  Patient denied suicidal ideation/intent/plan/means at this time.  Patient signed and agreed to ROIs, VA Policy, confidentiality & HIPAA polices, group rules/expectations, and PANSI.    Dimension #1 - Withdrawal Potential - Risk 0, no concern. Patient reports date of last use of crack cocaine on 12/2/16, reports most recent pattern of use has been a 3-4 binge followed by recovery in week, usually using $300-400 per binge. Patient also reports occasional use of alcohol, marijuana and meth when others had it. Patient reports does not report or display any withdrawal concerns.  Dimension #2 - Biomedical Conditions - Risk 1, mild concern.  Patient reports health concern of sciatic pain, reports having a primary care physician (Dr. Stapleton, [facility]), and has been referred to a neurologist. Patient reports taking medications as prescribed.  Dimension #3 - Emotional/Behavioral/Cognitive - Risk 2, moderate concern. Patient reports history of mental health diagnosis of depression, anxiety, bipolar I disorder and PTSD. Patient reports receiving psychiatry and individual psychotherapy [facility] and is happy with these services. Patient reports significant history of abuse. Patient reports some suicidal ideation in the past year, but no current ideation/intent/plan/means.  Dimension #4 - Treatment Acceptance/Resistance - Risk 0, no concern. Patient reports external motivation from probation, but also reports a strong desire for recovery.   Dimension #5 - Relapse Potential - Risk 2, moderate concern. Patient reports current abstinent over the past month, prior reports having very short periods of abstinent. Patient reports attempting outpatient last year, but reports discontinuing it, identifies unstable housing and using friends were triggers.    Dimension #6 - Recovery Environment - Risk 3, serious concern. Patient reports that she is currently unemployed and lives with her significant other and grandmother, reports helping grandmother around the house. Patient  reports her significant other is supportive, but is also recently sober. Patient reports she is currently on probation through the [facility] G[facility] court.     T) Explained ENRRIQUE, Robyn, manohar procedure, VA policy, TB & HIV/AIDS info and resources, confidentiality & HIPAA policies, Facility Abuse Prevention Plan, group rules/expectations, Patient Bill of Rights, counselor & supervisor license number and contact info, PANSI.    Patient expected to start group on 1/25/16.      JORGE Gore  1/24/2017, 10:17 AM

## 2021-06-09 NOTE — PROGRESS NOTES
"Weekly Progress Note  Kandy Yañez  1975  [id number]      D) Pt attended 2 groups this week with 0 absences. A) Staff facilitated groups and reviewed tx progress. Client states she struggled with getting enough rest this week and is being mindful about any manic symptoms. She stated that she quit smoking cigarettes ten days ago and is on the patch.  Client advocated for herself with her physician by requesting a higher dose of the nicotine patch. She shared insights into her relationship with both her grandmother and her S.O. In the [facility] outpatient group.  She reported meeting with her P.O. this week and completed several court ordered community service hours at \"[employer]\" food shelf. R) Pt. working on the following dimensions:  Dimension 1 Withdrawal Potential - Risk Level 0, no change.   Dimension 2 Biomedical Risk Level 0, No change.   Dimension 3 Emotional, Behavioral, Cognitive Risk Level 1, Minor concern. Staff changed risk level from a 2 to a 1 because client reports improved coping strategies.   Dimension 4 Treatment Acceptance Risk Level 0, No concern, No change.   Dimension 5 Relapse Potential Risk 2, moderate concern, No change.  Dimension 6 Recovery Environment Risk Level 3, serious concern, No change.    T) Client educated on Building Recovery Support. Client has completed 79 of 90 hours of program at this time. Projected discharge date is TBD.     JORGE Garces Student       Weekly Educational Topics Date Education   1. Understanding Dual Diagnoses, week 1         Understanding Dual Diagnoses, week 2     2. Coping with Stress     3. Managing Difficult Feelings     4. Building Recovery Support 3/15/17 and 3/17/17 The Anonymous People   5. Managing Thinking Problems     6. Developing Assertive Communication Skills     7. The Art of Change     8. Relapse Prevention     9. Relationships/Boundaries     10. Grief and Loss     11. Strengths     12. Dual Recovery Planning     [facility] Safety Unit " every Tuesday     HIV Education     OT/RT every Wednesday for one hour 3/15/17
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
## 2021-06-09 NOTE — PROGRESS NOTES
Correct pharmacy verified with patient and confirmed in snapshot? [x] yes []no    Medications Phoned  to Pharmacy [] yes [x]no  Name of Pharmacist:  List Medications, including dose, quantity and instructions      Medication Prescriptions given to patient   [] yes  [x] no   List the name of the drug the prescription was written for.      Medications ordered this visit were e-scribed.  Verified by order class [x] yes  [] no  Vistaril, Nicotine, Trazodone, Effexor    Medication changes or discontinuations were communicated to patient's pharmacy:  [] yes  [x] no  Pharmacist Spoke With:     UA collected [] yes  [x] no    [place] Prescription Monitoring Program Reviewed? [] yes  [x] no    Referrals/Labs were made to: None    Completed Charge Capture?  [x] yes  [] no    Future appointment was made: [x] yes  [] no  5/10/17  Dictation completed at time of chart check: [] yes  [x] no    I have checked the documentation for today s encounters and the above information has been reviewed and completed.

## 2021-06-09 NOTE — PROGRESS NOTES
"Weekly Progress Note:  Week of 2/27/17  Kandy Yañez  1975  [id number]      D) Pt attended 3 groups this week with 1 absence.  A) Staff facilitated groups and reviewed tx progress. R) Pt working on the following dimensions:  Dimension #1 - Withdrawal Potential - Risk 0, No change.   Dimension #2 - Biomedical - Risk 0, no concern.  No change.  Dimension #3 - Emotional/Behavioral/Cognitive - Risk 2 , moderate concern. Patient is managing bipolar disorder, JAYLIN and continues to work on her PTSD symptoms in group and with PTSD therapist. Patient reports good insight into her mental health concerns this week, in particular identifying symptoms of bipolar disorder.  She is using grounding techniques regularly and states that they are helping with her PTSD symptoms.  Dimension #4 - Treatment Acceptance/Resistance - Risk 0, no concern. No change.    Dimension #5 - Relapse Potential - Risk 2, moderate concern. No change.  Dimension #6 - Recovery Environment - Risk 3, serious concern. No change.    T) Client educated on Coping with Stress. Client has completed 59 of 90 hours of program at this time. Projected discharge date is Spring 2017. Current discharge plan is to be determined closer to discharge.     China Flores [facility] Student       Weekly Educational Topics Date Education   1. Understanding Dual Diagnoses, week 1 2/13/17 NKM2       Understanding Dual Diagnoses, week 2 2/20/17 PTSD and Addiction  [employer] Talk: Ellie Valladares   2. Coping with Stress 2/27/17 Mendoza Anderson: Humor and Stress Reduction  [employer]: \"Portrait of a Killer\"   3. Managing Difficult Feelings     4. Building Recovery Support     5. Managing Thinking Problems     6. Developing Assertive Communication Skills     7. The Art of Change     8. Relapse Prevention     9. Relationships/Boundaries     10. Grief and Loss 1/25/17 1/27/17    11. Strengths 1/30/17  1/31/17  2/1/17  2/3/17 The Anonymous People      Mindfulness   12. Dual Recovery Planning " 2/6/17 2/13/17    [facility]eking Safety Unit every Tuesday     HIV Education     OT/RT every Wednesday for one hour         Weekly Progress Note:  Week of 2/13/17  Kandy Yañez  1975  [id number]      D) Pt attended 4 groups this week with 0 absences.  She was enrolled in this group about a year ago but she dropped out.  A) Staff facilitated groups and reviewed tx progress. R) Pt working on the following dimensions:  Dimension #1 - Withdrawal Potential - Risk 0, No change.   Dimension #2 - Biomedical - Risk 0, no concern.  No change.  Dimension #3 - Emotional/Behavioral/Cognitive - Risk 2 , moderate concern. Patient is managing bipolar disorder and continues to work on her PTSD symptoms in group and with PTSD therapist.   Dimension #4 - Treatment Acceptance/Resistance - Risk 0, no concern. No change.    Dimension #5 - Relapse Potential - Risk 2, moderate concern. No change.  Dimension #6 - Recovery Environment - Risk 3, serious concern. No change.    T) Client educated on Dual Recovery Planning Client has completed 38 of 90 hours of program at this time. Projected discharge date is Spring 2017. Current discharge plan is to be determined closer to discharge.     JORGE Garces Student       Weekly Educational Topics Date Education   1. Understanding Dual Diagnoses, week 1         Understanding Dual Diagnoses, week 2     2. Coping with Stress     3. Managing Difficult Feelings     4. Building Recovery Support     5. Managing Thinking Problems     6. Developing Assertive Communication Skills     7. The Art of Change     8. Relapse Prevention     9. Relationships/Boundaries     10. Grief and Loss 1/25/17 1/27/17    11. Strengths 1/30/17  1/31/17  2/1/17  2/3/17 The Anonymous People      Mindfulness   12. Dual Recovery Planning 2/6/17 2/13/17    Seeking Safety Unit every Tuesday     HIV Education     OT/RT every Wednesday for one hour

## 2021-06-09 NOTE — PROGRESS NOTES
"Weekly Progress Note  Kandy Yañez  1975  [id number]      D) Pt attended 2 groups this week with 0 absences. A) Staff facilitated groups and reviewed tx progress. Assessed for [facility]. R) No VAP needed at this time. Pt working on the following dimensions:  Dimension #1 - Withdrawal Potential - Risk 0. No concern, no change   Dimension #2 - Biomedical - Risk 0, no concern, no change.  Dimension #3 - Emotional/Behavioral/Cognitive - Risk 1. Minor concern.  Client reports positive coping skills in group.  She continues to use mindfulness and grounding techniques to cope with PTSD symptoms.    Dimension #4 - Treatment Acceptance/Resistance - Risk 0, no concern, no change.  Dimension #5 - Relapse Potential - Risk 2. Moderate concern.  Client reports she cannot go to certain neighborhoods or intersection due to it contributing to addictive thinking and urges to use. She reports significant urges to use but maintains sobriety. Client stated in group today \"I know I will get through this without using\".  Dimension #6 - Recovery Environment - Risk 3. Serious concern.  Client lives with her aged grandmother and S.O. Who is also in early recovery.    T) Client educated on Managing Difficult Thinking. Client has completed 84 of approximately 90 hours of the [facility] outpatient program at this time. Projected discharge date is 4/15/17. Current discharge plan is Relapse Prevention program at [facility].     JORGE Garces Student       Weekly Educational Topics Date Education   1. Understanding Dual Diagnoses, week 1         Understanding Dual Diagnoses, week 2     2. Coping with Stress     3. Managing Difficult Feelings     4. Building Recovery Support     5. Managing Thinking Problems 3/22/17  3/24/17   \"The Power of Yet\" Stefanie Whitmore Talk   6. Developing Assertive Communication Skills     7. The Art of Change     8. Relapse Prevention     9. Relationships/Boundaries     10. Grief and Loss     11. Strengths     12. Dual " Recovery Planning     Seeking Safety Unit every Tuesday     HIV Education     OT/RT every Wednesday for one hour
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
## 2021-06-09 NOTE — PROGRESS NOTES
PTSD Skills Group Psychotherapy       CPT CODE: 54243   Frequency: Once a Week   START TIME: 1:00 STOP TIME: 2:00   Facilitator: FOREIGN Vieira and SUSHANT De, [facility]  Date: 02/16/2017  N= 3      Topic: Sleep      Group Session #9       Subjective: The patient was seen today for a 60 minute PTSD Skills (Essential Skills for PTSD) session held at [facility] outpatient clinic.       Mental Status Exam:   The patient was on time for scheduled session. Patient was pleasant and cooperative. The patient was oriented X4. The patient made good eye contact. The patient was well dressed with good grooming and hygiene. Recent and remote memories were intact. Concentration and focus: normal and the patient was able to stay on task. Tone, volume, and rate were intact. Mood and affect were congruently anxious but appropriate for the content of the session. Fund of knowledge was normal. Thought process was logical and linear. Insight and judgment were intact.       Objective:   Patient was able to participate and benefit from treatment as evidenced by her/his verbal expression and understanding of idea discussed. A cognitive behavioral modality was used.       New Symptoms or Complaints: The patient did not disclose any new symptoms to the undersigned.       Reason Patient is participating in the group: This session was necessary in order to teach the patient about PTSD sxs and how to manage them via new adaptive coping strategies.       Patient's response to current intervention: Patient was receptive of feedback given, supportive of other group members and appears to have benefited from the group process. No barriers to learning evidenced.       Progress toward short-term goals: The patient did not have any short-term goals since was the patient s introduction group session.       Patient's Impressions: The patient indicated readiness to learn by the patient s choice to attend group.       Are there  any new goals: There were no new goals brought to the undersigned s attention.       Patient education on the above topics was provided during this session. The patient was given an opportunity to ask questions and participate in the group discussion. The patient exhibited individual understanding by asking relevant questions and making appropriate comments to other group members.       Last session's practice exercises were reviewed and grounding exercises were initiated. The primary tasks for this session included psycho-education sleep disturbances. New skills introduced included improving sleep, sleep hygiene and sleep control technique. Patient agreed to complete the day log, night log and to review and fill out the daily diary card.          Diagnoses:   1. Bipolar 1 disorder, depressed, mild    2. PTSD (post-traumatic stress disorder)    3. JAYLIN (generalized anxiety disorder)    4. Cocaine dependence in early, early partial, sustained full, or sustained partial remission      Patient Denied SI, Plan and/or means.       Provider's Assessment: Patient evidenced the ability to understand how PTSD impacts a person s thoughts, feelings, and actions. The patient was active and participatory throughout group and participated in all group activities. The patient continues to be very appropriate for group therapy and appears to benefit from PTSD skills.       P: Continue Essential Skills for PTSD group to address PTSD sxs.       Follow-up: The patient also agreed to continue practicing the anger journal, progressive muscle relaxation, grounding, deep breathing, self-statements and daily diary card.      Discharge Criteria Planning: Patient reports that their sxs have decreased and the patient is ready for an evidenced based PTSD individual treatment (prolonged exposure for PTSD/CPT for PTSD).       Encounter performed and documented by Jerri Chanel [facility]

## 2021-06-09 NOTE — PROGRESS NOTES
Weekly Progress Note:  Week of 2/13/17  Kandy Yañez  1975  [id number]      D) Pt attended 4 groups this week with 0 absences.  She was enrolled in this group about a year ago but she dropped out.  A) Staff facilitated groups and reviewed tx progress. R) Pt working on the following dimensions:  Dimension #1 - Withdrawal Potential - Risk 0, No change.   Dimension #2 - Biomedical - Risk 0, no concern.  No change.  Dimension #3 - Emotional/Behavioral/Cognitive - Risk 2 , moderate concern. Patient is managing bipolar disorder and continues to work on her PTSD symptoms in group and with PTSD therapist.   Dimension #4 - Treatment Acceptance/Resistance - Risk 0, no concern. No change.    Dimension #5 - Relapse Potential - Risk 2, moderate concern. No change.  Dimension #6 - Recovery Environment - Risk 3, serious concern. No change.    T) Client educated on Dual Recovery Planning Client has completed 38 of 90 hours of program at this time. Projected discharge date is Spring 2017. Current discharge plan is to be determined closer to discharge.     China Flores [facility] Student       Weekly Educational Topics Date Education   1. Understanding Dual Diagnoses, week 1         Understanding Dual Diagnoses, week 2     2. Coping with Stress     3. Managing Difficult Feelings     4. Building Recovery Support     5. Managing Thinking Problems     6. Developing Assertive Communication Skills     7. The Art of Change     8. Relapse Prevention     9. Relationships/Boundaries     10. Grief and Loss 1/25/17 1/27/17    11. Strengths 1/30/17  1/31/17  2/1/17  2/3/17 The Anonymous People      Mindfulness   12. Dual Recovery Planning 2/6/17 2/13/17    Seeking Safety Unit every Tuesday     HIV Education     OT/RT every Wednesday for one hour

## 2021-06-09 NOTE — PROGRESS NOTES
Weekly Progress Note:  Week of 2/20/17  Kandy Yañez  1975  [id number]      D) Pt attended 3 groups this week with 1 excused absence.   A) Staff facilitated groups and reviewed tx progress. R) Pt working on the following dimensions:  Dimension #1 - Withdrawal Potential - Risk 0, No change.   Dimension #2 - Biomedical - Risk 0, no concern.  No change.  Dimension #3 - Emotional/Behavioral/Cognitive - Risk 2 , moderate concern. The patient is increasingly aware of her thinking and her mood.  She had an upsetting encounter with a  one morning on her way to group.  She did process this encounter in group which she found helpful.  Next step will be to help her keep her emotions in check should something like this happen again.    Dimension #4 - Treatment Acceptance/Resistance - Risk 0, no concern. No change.    Dimension #5 - Relapse Potential - Risk 2, moderate concern. No change.  Dimension #6 - Recovery Environment - Risk 3, serious concern. No change.    T) Client educated on Coping With Stress. Client has completed 59 of 90 hours of program at this time. Projected discharge date is Spring 2017. Current discharge plan is to be determined closer to discharge.     Yaa Cadena       Weekly Educational Topics Date Education   1. Understanding Dual Diagnoses, week 1 2/13/17 NKM2       Understanding Dual Diagnoses, week 2 2/20/17 PTSD and Addiction  Haim Talk: Ellie Valladares   2. Coping with Stress 2/28/17  3/1/17  3/3/17     Haim Talk, good stress   3. Managing Difficult Feelings     4. Building Recovery Support     5. Managing Thinking Problems     6. Developing Assertive Communication Skills     7. The Art of Change     8. Relapse Prevention     9. Relationships/Boundaries     10. Grief and Loss 1/25/17 1/27/17    11. Strengths 1/30/17  1/31/17  2/1/17  2/3/17 The Anonymous People      Mindfulness   12. Dual Recovery Planning 2/6/17  2/10/17    Seeking Safety Unit every Tuesday     HIV Education     OT/RT  every Wednesday for one hour

## 2021-06-09 NOTE — PROGRESS NOTES
Chief Complaint   Patient presents with   • Fever     Fever and sore throat starting 2 bernarda ago.       History Of Present Illness  Patient is a 12 year old male presenting to the [facility] in care of family c/o sore throat and fever, onset 2 days ago. Pt's mother reports pt began to experience a fever of 102F 2 days ago. He also complains of sore throat, headache, and cough. Pt's mother has been giving him Tylenol for his fever, and his last dose was this morning. His mother was concerned as his symptoms have not improved, and so she decided to bring him to the [facility] to be further evaluated. Pt's mother denies diarrhea, rash, ear pain, SOB, or any other complaints. He did not receive the flu vaccine this year.        Review of Systems  Review of Systems   Constitutional: Positive for fever. Negative for activity change, chills and unexpected weight change.   HENT: Positive for sore throat. Negative for ear pain, mouth sores and trouble swallowing.    Eyes: Negative for pain and visual disturbance.   Respiratory: Positive for cough. Negative for shortness of breath and wheezing.    Cardiovascular: Negative for chest pain and palpitations.   Gastrointestinal: Negative for abdominal pain, diarrhea, nausea and vomiting.   Genitourinary: Negative for dysuria.   Musculoskeletal: Negative for gait problem.   Skin: Negative for rash.   Neurological: Positive for headaches. Negative for dizziness.   Hematological: Negative for adenopathy.   Psychiatric/Behavioral: Negative for agitation.     10 point review of systems, otherwise negative.       Allergies  ALLERGIES:  No Known Allergies    Previous Medications    No medications on file       Past Medical History  Patient's family denies the patient having any pertinent medical Hx.    Surgical History  Patient's family denies the patient having any pertinent surgical Hx.     Social History  Social History     Tobacco Use   • Smoking status: Not on file   Substance Use Topics   •  Kandy Yañez attended 3 hours of group therapy today.    3/7/2017 3:31 PM China Flores   Alcohol use: Not on file   • Drug use: Not on file    Pt lives at home with family. Attends school.     Family History  Patient's family denies any family history pertinent to chief complaint       Last Recorded Vitals  Vitals:    03/04/20 1539   BP: (!) 151/74   Pulse: (!) 135   Resp: 18   Temp: (!) 102.4 °F (39.1 °C)   SpO2: 100%   Weight: 51.1 kg (112 lb 12.2 oz)   PainSc:  0       Physical Exam  Physical Exam   Constitutional: He is well-developed, well-nourished, and in no distress.   HENT:   Head: Normocephalic and atraumatic.   Mouth/Throat: Oropharynx is clear and moist.   Eyes: Conjunctivae and EOM are normal.   Neck: Normal range of motion. Neck supple.   Cardiovascular: Regular rhythm.   Tachycardic.    Pulmonary/Chest: Breath sounds normal. No respiratory distress. Musculoskeletal: Normal range of motion.     Neurological: He is alert.   Skin: Skin is warm and dry.   Psychiatric: Mood and affect normal.   Nursing note and vitals reviewed.        Labs     Results for orders placed or performed in visit on 03/04/20   POCT RAPID STREP A   Result Value    GRP A STREP Negative    Internal Procedural Controls Acceptable Yes   POCT INFLUENZA A/B   Result Value    Rapid Influenza A Ag Negative    Rapid Influenza B Ag Positive (A)         MDM:   Based on my history, physical exam, and diagnostic evaluation, the patient appears to have symptoms consistent with Influenza B. The pt has normal heart rate, normal oxygen saturation, a normal respiratory pattern and non-diagnostic exam. The pt appears to be in no distress, well-hydrated and ambulating in the immediate care without difficulty. They will be discharged with instructions given to mother to return if difficulty breathing, not tolerating oral food or fluids, any respiratory distress, or new symptoms. I encouraged follow-up with the primary care physician for repeat exam in 24-48 hours       Assessment and Plan    ED Diagnoses        Final diagnoses     Influenza B     Associated Orders          OSELTAMIVIR PHOSPHATE 6 MG/ML PO SUSR     Fever, unspecified fever cause     Associated Orders          POCT INFECTIOUS MONONUCLEOSIS ANTIBODY     POCT RAPID STREP A     IBUPROFEN 100 MG/5ML PO SUSP     POCT INFLUENZA A/B     STREP GROUP A CULTURE            Follow-up Referral  Follow up with your PCP in 2-3 days. Go to ED if symptoms worsen.       Dory Boswell is acting as a medical scribe for ED Clinician Dr. Calixto on 03/04/2020.     This documentation accurately reflects the work and decisions made by me, Kehinde Calixto MD.

## 2021-06-09 NOTE — PROGRESS NOTES
Week of:  3/6/17    D) Counselor met with client on 3/7/17 for a 1 to 1 counseling session and to review treatment plan. Client attended four groups this week. A) Counselor facilitated groups and reviewed tx progress. R) Discussed client's goals for the future, including long term employment goals. Counselor recommended that client begin to develop a relapse prevention plan in the event her S.O. relapses. Homework assignment is to increase recovery support network by attending at least two recovery meetings each week.  Client continues to successfully use mindfulness skills to manage symptoms of PTSD.  She reports that the PTSD skills group enhances her recovery. She reports that the PRIDE group she attends through the [facility] program is less helpful. Client will step down to six hours of [facility] outpatient treatment programming.  She plans to participate in Relapse Prevention program at [facility] once she completes the [facility] outpatient program. She has chosen to attend group on Wednesdays and Fridays. Client is working on the following dimensions:    Dimension 1 Withdrawal Potential - Risk Level 0, no change.    Dimension 2 Biomedical Risk Level 0, No change.    Dimension 3 Emotional, Behavioral, Cognitive Risk Level 2, moderate Concern. Client described an upsetting flashback which occurred in her kitchen and she was able to use mindfulness skills to redirect her thoughts and calm herself down.   Dimension 4 Treatment Acceptance Risk Level 0, No concern, No change.   Dimension 5 Relapse Potential Risk 2, moderate concern, No change.  Dimension 6 Recovery Environment Risk Level 3, serious concern, No change.  T) Client educated on Managing Difficult Feelings.  Client has completed 72 out of 90 treatment hours.     JORGE Garces Intern      Weekly Educational Topics Date Education   1. Understanding Dual Diagnoses, week 1 2/13/17 NKM2   Understanding Dual Diagnoses, week 2 2/20/17 PTSD and Addiction  Haim Talk:  Ellie Valladares   2. Coping with Stress 2/28/17  3/1/17  3/3/17       Haim Talk, good stress   3. Managing Difficult Feelings 3/6/17  Mindfulness exercise video    4. Building Recovery Support       5. Managing Thinking Problems       6. Developing Assertive Communication Skills       7. The Art of Change       8. Relapse Prevention       9. Relationships/Boundaries       10. Grief and Loss 1/25/17 1/27/17     11. Strengths 1/30/17  1/31/17  2/1/17  2/3/17 The Anonymous People        Mindfulness   12. Dual Recovery Planning 2/6/17  2/10/17     Seeking Safety Unit every Tuesday       HIV Education       OT/RT every Wednesday for one hour             China Flores, Intern

## 2021-06-09 NOTE — PROGRESS NOTES
MENTAL HEALTH VISIT NOTE    03/23/2017  Start time: 1000   Stop Time: 1045   Session # 6    Kandy Yañez is a 41 y.o. female being seen today for follow-up.    New symptoms or complaints: None    Functional Impairment:   Personal: 3  Family: 3  Work: 4  Social:3    The patient was late for her scheduled session. Patient was pleasant and cooperative. She was oriented X4. She made appropriate eye contact. She was well dressed with good grooming and hygiene. Recent and remote memories were intact. Concentration and focus: Focused and on task. Speech (tone, volume, and rate) were intact. Mood and affect were congruently overwhelmed but appropriate for the content of the session. Fund of knowledge was adequate. Thought process was logical and linear. Insight and judgment were intact.     Suicidal/Homicidal Ideation present: None Reported This Session    Patient's impression of their current status: Patient indicated feeling overwhelmed at times. Patient reported continuing to try and set boundaries with her grandma. Patient talked about ways she struggles to put her own needs first. Patient indicated trying to start making these positive changes yet feeling guilty at the same time.      Therapist impression of patients current state: Patient appears to have fair insight into her mental health. This therapist processed with patient the importance of self-care. This therapist challenged patient on ways not caring for herself has affected her mental health.    Progress toward short term goals: Progress as expected medication management with Dr. Betancourt, using coping skills, attending PTSD Skills returning to scheduled session.    Review of long term goals: Not done at today's session.     Diagnosis:   1. PTSD (post-traumatic stress disorder)    2. Mild depressed bipolar I disorder    3. JAYLIN (generalized anxiety disorder)    4. Cocaine dependence, in remission      Plan and Follow up: Patient will return in one week for  scheduled session to continue working on reducing mental health symptoms.  Patient will work on continuing to maintain sobriety. Patient will attend the PTSD Skills Group. Patient will continue to process her trauma. Patient will benefit from using assertive communication and not jumping to conclusions. Patient will benefit from continuing to use coping skills taught in session. Patient will benefit from self-care.     Discharge Criteria/Planning: Patient will continue with follow-up until therapy can be discontinued without return of signs and symptoms.    Encounter performed and documented by FOREIGN Vieira

## 2021-06-09 NOTE — PROGRESS NOTES
Kandy Yañez attended 3 hours of group therapy today.  Beginning next week, the patient will attend OP MICD TX groups on Wednesdays and Fridays.    3/10/2017 2:12 PM Yaa Cadena

## 2021-06-09 NOTE — PROGRESS NOTES
Weekly Progress Note:  Week of 2/20/17  Kandy Yañez  1975  [id number]      D) Pt attended 4 groups this week with 0 excused absence.   A) Staff facilitated groups and reviewed tx progress. R) Pt working on the following dimensions:  Dimension #1 - Withdrawal Potential - Risk 0, No change.   Dimension #2 - Biomedical - Risk 0, no concern.  No change.  Dimension #3 - Emotional/Behavioral/Cognitive - Risk 2 , moderate concern. Patient is managing bipolar disorder and continues to work on her PTSD symptoms in group and with PTSD therapist.  Patient experienced a flashback during group this week.  She needed counseling assistance in [facility].  This staff encouraged her to slow her breathing and use grounding techniques.  Patient was able to rejoin [facility] outpatient group for the remainder of the group.   Dimension #4 - Treatment Acceptance/Resistance - Risk 0, no concern. No change.    Dimension #5 - Relapse Potential - Risk 2, moderate concern. No change.  Dimension #6 - Recovery Environment - Risk 3, serious concern. No change.    T) Client educated on Understanding Dual Diagnosis. Client has completed 50 of 90 hours of program at this time. Projected discharge date is Spring 2017. Current discharge plan is to be determined closer to discharge.     China Flores [facility] Student       Weekly Educational Topics Date Education   1. Understanding Dual Diagnoses, week 1 2/13/17 NKM2       Understanding Dual Diagnoses, week 2 2/20/17 PTSD and Addiction  Haim Talk: Ellie Valladares   2. Coping with Stress     3. Managing Difficult Feelings     4. Building Recovery Support     5. Managing Thinking Problems     6. Developing Assertive Communication Skills     7. The Art of Change     8. Relapse Prevention     9. Relationships/Boundaries     10. Grief and Loss 1/25/17 1/27/17    11. Strengths 1/30/17  1/31/17  2/1/17  2/3/17 The Anonymous People      Mindfulness   12. Dual Recovery Planning 2/6/17  2/10/17    [facility] Safety Unit  every Tuesday     HIV Education     OT/RT every Wednesday for one hour
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
## 2021-06-10 NOTE — PROGRESS NOTES
MENTAL HEALTH VISIT NOTE    05/04/2017  Start time: 800   Stop Time: 845   Session # 8    Kandy Yañez is a 41 y.o. female being seen today for follow-up.    New symptoms or complaints: None    Functional Impairment:   Personal: 3  Family: 3  Work: 4  Social:3    The patient was on time for her scheduled session. Patient was pleasant and cooperative. She was oriented X4. She made appropriate eye contact. She was well dressed with good grooming and hygiene. Recent and remote memories were intact. Concentration and focus: Focused and on task. Speech (tone, volume, and rate) were intact. Mood and affect were congruently concerned but appropriate for the content of the session. Fund of knowledge was adequate. Thought process was logical and linear. Insight and judgment were intact.     Suicidal/Homicidal Ideation present: None Reported This Session    Patient's impression of their current status: Patient indicated having her ups and downs. Patient reported having some relationship conflicts. Patient reflected on how she was able to overcome these conflicts and ways she is still struggling. Patient talked about continuing to work on herself her her needs.      Therapist impression of patients current state: Patient appears to have fair insight into her mental health. This therapist processed with patient ways she was able to handle the stressful situations. This therapist challenged patient on ways she continues to struggle with self-care and not feeling guilty over putting her needs first at times.     Progress toward short term goals: Progress as expected medication management with Dr. Betancourt, using coping skills, attending PTSD Skills returning to scheduled session.    Review of long term goals: Not done at today's session.     Diagnosis:   1. PTSD (post-traumatic stress disorder)    2. Mild depressed bipolar I disorder    3. JAYLIN (generalized anxiety disorder)    4. Cocaine dependence, in remission      Plan and  Follow up: Patient will return in one week for scheduled session to continue working on reducing mental health symptoms.  Patient will work on continuing to maintain sobriety. Patient will attend the PTSD Skills Group. Patient will continue to process her trauma. Patient will benefit from using assertive communication and not jumping to conclusions. Patient will benefit from continuing to use coping skills taught in session. Patient will benefit from self-care.     Discharge Criteria/Planning: Patient will continue with follow-up until therapy can be discontinued without return of signs and symptoms.    Encounter performed and documented by FOREIGN Vieira

## 2021-06-10 NOTE — PROGRESS NOTES
PTSD Skills Group Psychotherapy   CPT CODE: 28279   Frequency: Once a Week   START TIME: 1:00 STOP TIME: 2:00   Facilitator: FOREIGN Vieira  and SUSHANT Andrade, Health system   Date: 05/18/2017   N=  8    Topic: Skills for Intrusive Thoughts and Flashbacks     Group Session #3    Subjective: The patient was seen today for a 60 minute PTSD Skills (Essential Skills for PTSD) session held at [facility] outpatient clinic.     Mental Status Exam:   The patient was on time for the scheduled session. Patient was pleasant and cooperative. The patient was oriented X4. The patient made good eye contact. The patient was well dressed with good grooming and hygiene. Recent and remote memories were intact. Concentration and focus: normal and able to stay on task. Tone, volume, and rate were intact. Mood and affect were congruently anxious but appropriate for the content of the session. Fund of knowledge was normal. Thought process was logical and linear. Insight and judgment were intact.     Objective:   Patient was able to participate and benefit from treatment as evidenced by her/his verbal expression and understanding of idea discussed. A cognitive behavioral modality was used.     New Symptoms or Complaints: The patient did not disclose any new symptoms to the undersigned.     Reason Patient is participating in the group: This session was necessary in order to teach the patient about PTSD sxs and how to manage them via new adaptive coping strategies.     Patient's response to current intervention: Patient was receptive of feedback given, supportive of other group members and appears to have benefited from the group process. No barriers to learning evidenced.   Progress toward short-term goals: The patient did not have any short-term goals since was the patient s introduction group session.     Patient's Impressions: The patient indicated readiness to learn by the patient s choice to attend group.     Are there  any new goals: There were no new goals brought to the undersigned s attention.  Patient education on the above topics was provided during this session. The patient was given an opportunity to ask questions and participate in the group discussion. The patient exhibited individual understanding by asking relevant questions and making appropriate comments to other group members.     Introductions to group leaders and members were made; the group collaboratively set guidelines including respect, confidentiality, participation, and no trauma stories; limits to confidentiality were reviewed.     The primary tasks for this session included an introduction to intrusive experiences and flashbacks, psycho-education and normalization of the experience, techniques to alleviate intrusive thought and flashbacks (thought stopping, grounding techniques and channels technique), and assignment of homework.     A:   Diagnoses:  1. PTSD (post-traumatic stress disorder)    2. Mild depressed bipolar I disorder    3. JAYLIN (generalized anxiety disorder)      Patient Denied SI, Plan and/or means.     Provider's Assessment: Patient evidenced the ability to understand how PTSD impacts a person s thoughts, feelings, and actions. The patient was active and participatory throughout group and participated in all group activities. The patient continues to be very appropriate for group therapy and appears to benefit from PTSD skills.     P: Continue Essential Skills for PTSD group to address PTSD sxs.     Follow-up: The patients agreed to monitor daily tension using the  daily diary card. The patient also agreed engaging in  calm  breathing and progressive muscle relaxation daily.     Discharge Criteria Planning: Patient reports that his/her sxs have decreased and the patient is ready for an evidenced based PTSD individual treatment (prolonged exposure for PTSD/CPT   for PTSD).     Encounter performed and documented by FOREIGN Vieira

## 2021-06-10 NOTE — PROGRESS NOTES
Weekly Progress Note: Week of 4/24/17  Kandy Yañez   Patient has completed the MICD Outpatient phase of her treatment and is referred to [facility] Relapse Prevention group.  1975  [id number]      D) Pt attended 0 groups  this week with 0 absences.  A) Staff facilitated groups and reviewed tx progress with client. Assessed for [facility]. R) No VAP needed at this time. Pt working on the following dimensions:  Dimension #1 - Withdrawal Potential - Risk 0. No concern, no change.  Dimension #2 - Biomedical - Risk 0. No concern, no change.  Dimension #3 - Emotional/Behavioral/Cognitive - Risk 1. Minor concern, no change. Client reports strong urges to use and states that she misses the lifestyle revolving around drug use.    Dimension #4 - Treatment Acceptance/Resistance - Risk 0. No concern, no change.  Dimension #5 - Relapse Potential - Risk 2. Moderate concern, no change.  Client reports some urges and addictive thinking but is actively seeking sober support through twelve step meetings.  Dimension #6 - Recovery Environment - Risk 3. Serious concern, no change. Client states she lives with elderly grandmother and S.O. Who is also in early recovery.  T) Client is being transferred to the Relapse Prevention which meets on Tuesdays from 9:30-12:30 pm beginning 4/25/17.     JORGE Garces Student       Weekly Educational Topics Date Education   1. Understanding Dual Diagnoses, week 1         Understanding Dual Diagnoses, week 2     2. Coping with Stress     3. Managing Difficult Feelings     4. Building Recovery Support     5. Managing Thinking Problems     6. Developing Assertive Communication Skills     7. The Art of Change 4/5/17 4/7/17    8. Relapse Prevention 4/12/17 4/14/17          9. Relationships/Boundaries        10. Grief and Loss     11. Strengths     12. Dual Recovery Planning     Seeking Safety Unit every Tuesday     HIV Education     OT/RT every Wednesday for one hour
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
## 2021-06-10 NOTE — PROGRESS NOTES
PTSD Skills Group Psychotherapy   CPT CODE: 49825  Frequency: Once a Week   START TIME: 2:00 STOP TIME: 3:00  Facilitator: FOREIGN Vieira and SUSHANT Andrade, [facility]  Date: 05/4/2017  N= 8    Topic: Introduction to PTSD Skills Group   Group Session #1    Subjective: The patient was seen today for a 60 minute PTSD Skills (Essential Skills for PTSD) session held at [facility] outpatient clinic.     Mental Status Exam:  The patient was on time for the scheduled session. Patient was pleasant and cooperative. The patient was oriented X4. The patient made good eye contact. The patient was well dressed with good grooming and hygiene. Recent and remote memories were intact. Concentration and focus: normal and able to stay on task. Tone, volume, and rate were intact. Mood and affect were congruently anxious but appropriate for the content of the session. Fund of knowledge was normal. Thought process was logical and linear. Insight and judgment were intact.     Objective:  Patient was able to participate and benefit from treatment as evidenced by her/his verbal expression and understanding of idea discussed. A cognitive behavioral modality was used.     New Symptoms or Complaints: The patient did not disclose any new symptoms to the undersigned.     Reason Patient is participating in the group: This session was necessary in order to teach the patient about PTSD sxs and how to manage them via new adaptive coping strategies.     Patient's response to current intervention: Patient was receptive of feedback given, supportive of other group members and appears to have benefited from the group process. No barriers to learning evidenced.    Progress toward short-term goals: The patient did not have any short-term goals since was the patient s introduction group session.    Patient's Impressions: The patient indicated readiness to learn by the patient s choice to attend group.    Are there any new goals: There  were no new goals brought to the undersigned s attention.   Patient education on the above topics was provided during this session. The patient was given an opportunity to ask questions and participate in the group discussion. The patient exhibited individual understanding by asking relevant questions and making appropriate comments to other group members.     Introductions to group leaders and members were made; the group collaboratively set guidelines including respect, confidentiality, participation, and no trauma stories; limits to confidentiality were reviewed.     The primary tasks for this session included an introduction to group tx, PTSD   psychoeducation, normalization of the experience of PTSD, an outline of topics covered in this treatment, introduction to the three-part (CBT) model, goal setting discussion, and assignment of homework.    A:  Diagnoses:   1. PTSD (post-traumatic stress disorder)    2. Mild depressed bipolar I disorder    3. JAYLIN (generalized anxiety disorder)    4. Cocaine dependence, in remission      Provider's Assessment: Patient evidenced the ability to understand how PTSD impacts a person s thoughts, feelings, and actions. The patient was active and participatory throughout group and participated in all group activities. The patient continues to be very appropriate for group therapy and appears to benefit from PTSD skills.    P: Continue Essential Skills for PTSD group to address PTSD sxs.  Follow-up: The patients agreed to monitor daily tension using the ES daily diary card. The patient also agreed to set two realistic goals.    Discharge Criteria Planning: Patient reports that his/her sxs have decreased and the patient is ready for an evidenced based PTSD individual treatment (prolonged exposure for PTSD/CPT for PTSD).    Performed by FOREIGN Vieira and SUSHANT Andrade, [facility] and documented by FOREIGN Vieira

## 2021-06-10 NOTE — PROGRESS NOTES
Kandy Yañez attended 3 hours of group therapy today.    4/19/2017 2:49 PM China Flores  
Anemia
dyspnea
star
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
## 2021-06-10 NOTE — PROGRESS NOTES
"Met with Patient for 1:1 for Treatment Plan Review. Patient had discussed during group this past week that she was having intense urges along with problems with her interpersonal relationship. Patient and Staff discussed her recent discovery of her boyfriend taking medication for HIV and not disclosing this information to her. Patient expresses feelings of betrayal, anger, and fear towards her boyfriend's non-disclosure. Pt discussed how she has been trying to gently bring her concerns up in conversation. Discussed the possibility of them doing couples therapy along with seeing medical doctors together. Pt endorses the idea of starting couples therapy, this writer will provide her with some resources of where to start this process. Pt believes that her relationship with her boyfriend is healthy, however recognizes that she does have some codependency concerns with him. Patient also discussed concerns with her care taking of her grandma, whom she lives with. Discussed with patient her lack of self-care and importance of finding time for herself. Patient reports she has started to develop a more structured schedule in her day and not allowing her grandmothers \"emergencies\" to get in the way of her day. Pt also reports that she will seek out help from her mom and extended family to assist with her grandmothers care. Pt recognizes that being the sole care giver is placing her at a higher risk of relapse. Pt discussed her desire to return to work, however is feeling lost as to where to start this process; not wanting to return to her previous line of work as a . Pt may benefit from seeking out vocational/career advice. Pt expresses gratitude for being in the Relapse Prevention program and asks to continue to be held accountable to her way of thinking, challenging times when she may be in denial or not seeing the entire picture of what is going on.     Dolores Tavares   [facility] Student  5/5/2017  "
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
## 2021-06-10 NOTE — PROGRESS NOTES
Correct pharmacy verified with patient and confirmed in snapshot? [x] yes []no    Medications Phoned  to Pharmacy [] yes [x]no  Name of Pharmacist:  List Medications, including dose, quantity and instructions      Medication Prescriptions given to patient   [] yes  [x] no   List the name of the drug the prescription was written for.      Medications ordered this visit were e-scribed.  Verified by order class [] yes  [x] no      Medication changes or discontinuations were communicated to patient's pharmacy:  [] yes  [x] no  Pharmacist Spoke With:     UA collected [] yes  [x] no    [place] Prescription Monitoring Program Reviewed? [x] yes  [] no    Referrals/Labs were made to: None    Completed Charge Capture?  [x] yes  [] no    Future appointment was made: [x] yes  [] no  7/12/17  Dictation completed at time of chart check: [] yes  [x] no    I have checked the documentation for today s encounters and the above information has been reviewed and completed.

## 2021-06-10 NOTE — PROGRESS NOTES
Correct pharmacy verified with patient and confirmed in snapshot? [x] yes []no    Medications Phoned  to Pharmacy [] yes [x]no  Name of Pharmacist:  List Medications, including dose, quantity and instructions      Medication Prescriptions given to patient   [] yes  [x] no   List the name of the drug the prescription was written for.      Medications ordered this visit were e-scribed.  Verified by order class [x] yes  [] no  Depakote    Medication changes or discontinuations were communicated to patient's pharmacy:  [x] yes  [] no  Pharmacist Spoke With: Shwetha regarding dose change in Depakote    UA collected [] yes  [x] no    [place] Prescription Monitoring Program Reviewed? [x] yes  [] no    Referrals/Labs were made to:     Completed Charge Capture?  [x] yes  [] no    Future appointment was made: [x] yes  [] no  6/28/17  Dictation completed at time of chart check: [] yes  [x] no    I have checked the documentation for today s encounters and the above information has been reviewed and completed.
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
## 2021-06-10 NOTE — PROGRESS NOTES
Weekly Progress Note: Week of 4/3/17  Kandy Yañez  1975  [id number]      D) Pt attended 2 groups  this week with 0 absences. A) This writer was on vacation for the week noted, 4/3/17-4/7/17.  Staff facilitated groups and reviewed tx progress with client. Assessed for [facility]. R) No VAP needed at this time. Pt working on the following dimensions:  Dimension #1 - Withdrawal Potential - Risk 0. No concern, no change.  Dimension #2 - Biomedical - Risk 0. No concern, no change.  Dimension #3 - Emotional/Behavioral/Cognitive - Risk 1. Minor concern. Client reports strong urges to use and states that she misses the lifestyle revolving around drug use.    Dimension #4 - Treatment Acceptance/Resistance - Risk 0. No concern, no change.  Dimension #5 - Relapse Potential - Risk 2. Moderate concern.  Client reports some urges and addictive thinking but is actively seeking sober support through twelve step meetings.  Dimension #6 - Recovery Environment - Risk 3. Serious concern.  Client states she lives with elderly grandmother and S.O. Who is also in early recovery.  T) Client educated on The Art of Change. Client has completed 96 hours of programming at this time. Projected discharge date is TBD. Current discharge plan is TBD.     China Flores [facility] Student       Weekly Educational Topics Date Education   1. Understanding Dual Diagnoses, week 1         Understanding Dual Diagnoses, week 2     2. Coping with Stress     3. Managing Difficult Feelings     4. Building Recovery Support     5. Managing Thinking Problems     6. Developing Assertive Communication Skills     7. The Art of Change 4/5/17 and 4/7/17    8. Relapse Prevention     9. Relationships/Boundaries     10. Grief and Loss     11. Strengths     12. Dual Recovery Planning     Seeking Safety Unit every Tuesday     HIV Education     OT/RT every Wednesday for one hour

## 2021-06-10 NOTE — PROGRESS NOTES
Weekly Progress Note: Week of 4/10/17  Kandy Yañez  1975  [id number]      D) Pt attended 2 groups  this week with 0 absences. Pt. completed 1 hour of 1 to 1 counseling. A) Staff facilitated groups and reviewed tx progress with client. Assessed for VA. R) No VAP needed at this time. Pt working on the following dimensions:  Dimension #1 - Withdrawal Potential - Risk 0. No concern, no change.  Dimension #2 - Biomedical - Risk 0. No concern, no change.  Dimension #3 - Emotional/Behavioral/Cognitive - Risk 1. Minor concern, no change. Client reports strong urges to use and states that she misses the lifestyle revolving around drug use.    Dimension #4 - Treatment Acceptance/Resistance - Risk 0. No concern, no change.  Dimension #5 - Relapse Potential - Risk 2. Moderate concern, no change.  Client reports some urges and addictive thinking but is actively seeking sober support through twelve step meetings.  Dimension #6 - Recovery Environment - Risk 3. Serious concern, no change. Client states she lives with elderly grandmother and S.O. Who is also in early recovery.  T) Client educated on Relapse Prevention . Client has completed 102 hours of programming at this time. Discharge date is 4/21/17. Current discharge plan is Relapse Prevention on Tuesdays from 9:30-12:30 pm beginning 4/25/17.     JORGE Garces Student       Weekly Educational Topics Date Education   1. Understanding Dual Diagnoses, week 1         Understanding Dual Diagnoses, week 2     2. Coping with Stress     3. Managing Difficult Feelings     4. Building Recovery Support     5. Managing Thinking Problems     6. Developing Assertive Communication Skills     7. The Art of Change 4/5/17 4/7/17    8. Relapse Prevention 4/12/17 4/14/17          9. Relationships/Boundaries        10. Grief and Loss     11. Strengths     12. Dual Recovery Planning     Seeking Safety Unit every Tuesday     HIV Education     OT/RT every Wednesday for one hour

## 2021-06-10 NOTE — PROGRESS NOTES
MENTAL HEALTH VISIT NOTE    04/13/2017  Start time: 800   Stop Time: 845   Session # 7    Kandy Yañez is a 41 y.o. female being seen today for follow-up.    New symptoms or complaints: None    Functional Impairment:   Personal: 3  Family: 3  Work: 4  Social:3    The patient was late for her scheduled session. Patient was pleasant and cooperative. She was oriented X4. She made appropriate eye contact. She was well dressed with good grooming and hygiene. Recent and remote memories were intact. Concentration and focus: Focused and on task. Speech (tone, volume, and rate) were intact. Mood and affect were congruently concerned but appropriate for the content of the session. Fund of knowledge was adequate. Thought process was logical and linear. Insight and judgment were intact.     Suicidal/Homicidal Ideation present: None Reported This Session    Patient's impression of their current status: Patient reported being concerned at times. Patient indicated that she has a lot going on in her life. Patient reported many of the things are good just change. Patient talked about ways she has struggled with change in the past. Patient indicated working to understand her own barriers and ways to move forward.      Therapist impression of patients current state: Patient appears to have fair insight into her mental health. This therapist challenged patient on ways she allows herself to stay suck. This therapist processed with patient ways to work on moving forward and addressing the uncomfortable feelings that occur.     Progress toward short term goals: Progress as expected medication management with Dr. Betancourt, using coping skills, attending PTSD Skills returning to scheduled session.    Review of long term goals: Not done at today's session.     Diagnosis:   1. PTSD (post-traumatic stress disorder)    2. Mild depressed bipolar I disorder    3. JAYLIN (generalized anxiety disorder)    4. Cocaine dependence, in remission      Plan  and Follow up: Patient will return in one week for scheduled session to continue working on reducing mental health symptoms.  Patient will work on continuing to maintain sobriety. Patient will attend the PTSD Skills Group. Patient will continue to process her trauma. Patient will benefit from using assertive communication and not jumping to conclusions. Patient will benefit from continuing to use coping skills taught in session. Patient will benefit from self-care.     Discharge Criteria/Planning: Patient will continue with follow-up until therapy can be discontinued without return of signs and symptoms.    Encounter performed and documented by FOREIGN Vieira
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
## 2021-06-10 NOTE — PROGRESS NOTES
"D. Client attended 2 hours of group therapy today and 1 hour of 1 to 1 counseling.  Client has completed 99 hours of outpatient [facility] group therapy.  A. During 1 to 1 session, client reported that she is clean and sober. This writer inquired about recovery support.  Client stated that she attends twelve step meetings twice per week and that she finds this helpful.  She also noted that there is a potential sponsor in one of her support groups. She cited that she has completed one round of PTSD groups with Jenae Chanel but will pursue this group again due to legal requirements.  Discussed client insight into hypervigilance symptoms of PTSD and her utilization of mindfulness and grounding skills to cope. Client states that she will continue to work with psychiatrist and a psychotherapist on an individual basis once she graduates from [facility] outpatient.  Writer discussed moving on to relapse prevention group. Client endorsed that she \"feels ready\" to graduate from the [facility] program on April 21, 2017 and wishes to begin the Tuesday morning relapse prevention group. R. 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 Writer suggested some strategies to meet this goal.  T. This writer to follow up with transfer to relapse prevention group and prepare for client graduation on April 21, 2017.  "

## 2021-06-10 NOTE — PROGRESS NOTES
PTSD Skills Group Psychotherapy   CPT CODE: 72198   Frequency: Once a Week   START TIME: 2:00pm STOP TIME: 3:00pm   Facilitator: Jerri Chanel MA, [facility] and SUSHANT Andrade, *[facility]  Date: 05/11/2017  N= 8     Topic: Skills for Hypervigilance and Startle Response     Group Session #2     Subjective: The patient was seen today for a 60 minute PTSD Skills (Essential Skills for PTSD) session held at [facility] outpatient clinic.     Mental Status Exam:   The patient was on time for her scheduled session. Patient was pleasant and cooperative. The patient was oriented X4. The patient made good eye contact. The patient was well dressed with good grooming and hygiene. Recent and remote memories were intact. Concentration and focus: normal and she was able to stay on task. Tone, volume, and rate were intact. Mood and affect were congruently anxious but appropriate for the content of the session. Fund of knowledge was normal. Thought process was logical and linear. Insight and judgment were intact.     Objective:   Patient was able to participate and benefit from treatment as evidenced by her/his verbal expression and understanding of idea discussed. A cognitive behavioral modality was used.     New Symptoms or Complaints: The patient did not disclose any new symptoms to the undersigned.     Reason Patient is participating in the group: This session was necessary in order to teach the patient about PTSD sxs and how to manage them via new adaptive coping strategies.    Patient's response to current intervention: Patient was receptive of feedback given,   supportive of other group members and appears to have benefited from the group process. No barriers to learning evidenced.     Progress toward short-term goals: The patient did not have any short-term goals since was the patient s first group session.     Patient's Impressions: The patient indicated readiness to learn by the patient s choice to attend group.      Are there any new goals: There were no new goals brought to the undersigned s attention.   Patient education on the above topics was provided during this session. The patient was given an opportunity to ask questions and participate in the group discussion. The patient exhibited individual understanding by asking relevant questions and making appropriate comments to other group members.     The primary tasks for this session included describing how hypervigilance and the startle response develop; explain the effects of hypervigilance on the body, emotions, and thoughts; and provide instruction on relaxation techniques to manage symptoms associated with hypervigilance.     A:   1. PTSD (post-traumatic stress disorder)    2. JAYLIN (generalized anxiety disorder)    3. Mild depressed bipolar I disorder    4. Cocaine dependence, in remission        Patient Denied SI, Plan and/or means.     Provider's Assessment: Patient evidenced the ability to understand how PTSD impacts a person s thoughts, feelings, and actions. The patient was active and participatory throughout group and participated in all group activities. The patient continues to be very appropriate for group therapy and appears to benefit from PTSD skills.     P: Continue Essential Skills for PTSD group to address PTSD sxs.     Follow-up: The patients agreed to monitor daily tension using the  daily diary card. The patient also agreed to Practice deep breathing and PRM.     Discharge Criteria Planning: Patient reports that sxs have decreased and the patient is ready for an evidenced based PTSD individual treatment (prolonged exposure for PTSD/CPT for PTSD).    Encounter performed and documented by DK Andrade
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
## 2021-06-11 NOTE — PROGRESS NOTES
PTSD Skills Group Psychotherapy   CPT CODE: 60842   Frequency: Once a Week   START TIME: 2:00 STOP TIME: 3:00   Facilitator: FOREIGN Vieira and SUSHANT Andrade, [facility]  Date: 06/22/2017  N= 6    Topic: Anger    Group Session #7     Subjective: The patient was seen today for a 60 minute PTSD Skills (Essential Skills for PTSD) session held at [facility] outpatient clinic.     Mental Status Exam:   The patient was on time for scheduled session. Patient was pleasant and cooperative. The patient was oriented X4. The patient made good eye contact. The patient was well dressed with good grooming and hygiene. Recent and remote memories were intact. Concentration and focus: normal and she was able to stay on task. Tone, volume, and rate were intact. Mood and affect were congruently anxious but appropriate for the content of the session. Fund of knowledge was normal. Thought process was logical and linear. Insight and judgment were intact.     Objective:   Patient was able to participate and benefit from treatment as evidenced by their verbal expression and understanding of idea discussed. A cognitive behavioral modality was used.     New Symptoms or Complaints: The patient did not disclose any new symptoms to the undersigned.     Reason Patient is participating in the group: This session was necessary in order to teach the patient about PTSD sxs and how to manage them via new adaptive coping strategies.     Patient's response to current intervention: Patient was receptive of feedback given, supportive of other group members and appears to have benefited from the group process. No barriers to learning evidenced.     Progress toward short-term goals: The patient completed their homework.     Patient's Impressions: The patient indicated readiness to learn by the patient s choice to attend group.     Are there any new goals: There were no new goals brought to the undersigned s attention.   Patient education  on the above topics was provided during this session. The patient was given an opportunity to ask questions and participate in the group discussion. The patient exhibited individual understanding by asking relevant questions and making appropriate comments to other group members.     Last session's practice exercises were reviewed. The primary tasks for this session included understanding the physiology of anger, emotional experience of anger (anger is neither good nor bad), problem anger versus healthy anger, physical, emotional and social consequences of problem anger, assess your anger, physical arousal, actions, behaviors and thoughts.    A:   1. PTSD (post-traumatic stress disorder)    2. Mild depressed bipolar I disorder    3. JAYLIN (generalized anxiety disorder)    4. Cocaine dependence, in remission      Patient Denied SI, Plan and/or means.     Provider's Assessment: Patient evidenced the ability to understand how PTSD impacts a person s thoughts, feelings, and actions. The patient was active and participatory throughout group and participated in all group activities. The patient continues to be very appropriate for group therapy and appears to benefit from PTSD skills.     P: Continue Essential Skills for PTSD group to address PTSD sxs.     Follow-up: The patient also agreed to continue practicing the anger journal, progressive muscle relaxation, grounding, deep breathing, self-statements and daily diary card.     Discharge Criteria Planning: Patient reports that their sxs have decreased and the patient is ready for an evidenced based PTSD individual treatment (prolonged exposure for PTSD/CPT for PTSD).
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
## 2021-06-11 NOTE — PROGRESS NOTES
PTSD Skills Psychotherapy   Frequency: Once a Week   START TIME: 2:00 STOP TIME: 3:00   Facilitator:  Jerri Chanel [facility] and SUSHANT Andrade, [facility]  Date: 06/01/2017  N= 6    Topic: Emotional Regulation and Upper Limits   Session #6    Subjective: The patient was seen today for a 60 minute PTSD Skills (Essential Skills for PTSD) session held at [facility] outpatient clinic.     Mental Status Exam:   The patient was on time for scheduled session. Patient was pleasant and cooperative. The patient was oriented X4. The patient made good eye contact. The patient was well dressed with good grooming and hygiene. Recent and remote memories were intact. Concentration and focus: normal and she was able to stay on task. Tone, volume, and rate were intact. Mood and affect were congruently anxious but appropriate for the content of the session. Fund of knowledge was normal. Thought process was logical and linear. Insight and judgment were intact.     Objective:   Patient was able to participate and benefit from treatment as evidenced by verbal expression and understanding of idea discussed. A cognitive behavioral modality was used.     New Symptoms or Complaints: The patient did not disclose any new symptoms to the undersigned.   Reason Patient is participating in the group: This session was necessary in order to teach the patient about PTSD sxs and how to manage them via new adaptive coping strategies.     Patient's response to current intervention: Patient was receptive of feedback given. No barriers to learning evidenced.     Progress toward short-term goals: The patient completed homework.     Patient's Impressions: The patient indicated readiness to learn by the patient s choice to attend session.     Are there any new goals: There were no new goals brought to the undersigned s attention.     Patient education on the above topics was provided during this session. The patient was given an opportunity to ask  questions and participate in the discussion. The patient exhibited individual understanding by asking relevant questions and making appropriate comments.    Last session's practice exercises were reviewed. The primary tasks for this session included understanding emotional regulations and upper limits, ways depression and PTSD interact together, feeling your feelings safely, mindfulness and assignment of homework.     A:     1. Mild depressed bipolar I disorder    2. JAYLIN (generalized anxiety disorder)    3. PTSD (post-traumatic stress disorder)                  Patient Denied SI, Plan and/or means.     Provider's Assessment: Patient evidenced the ability to understand how PTSD impacts a person s thoughts, feelings, and actions. The patient was active and participatory throughout group and participated in all group activities. The patient continues to be very appropriate for group therapy and appears to benefit from PTSD skills.     P: Continue Essential Skills for PTSD to address PTSD sxs.     Follow-up: The patient also agreed to continue practicing the identification of her upper limits by identifying what might change if patient starts to feel better and could tolerate having good things happen in her life.     Discharge Criteria Planning: Patient reports that sxs have decreased and the patient is ready for an evidenced based PTSD individual treatment (prolonged exposure for PTSD/CPT for PTSD).
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
## 2021-06-11 NOTE — PROGRESS NOTES
PTSD Skills Group Psychotherapy       CPT CODE: 36338   Frequency: Once a Week   START TIME: 1:00 STOP TIME: 2:00   Facilitator: FOREIGN Vieira and SUSHANT Andrade, [facility]  Date: 07/06/2017  N= 3      Topic: Sleep      Group Session #9       Subjective: The patient was seen today for a 60 minute PTSD Skills (Essential Skills for PTSD) session held at [facility] outpatient clinic.       Mental Status Exam:   The patient was on time for scheduled session. Patient was pleasant and cooperative. The patient was oriented X4. The patient made good eye contact. The patient was well dressed with good grooming and hygiene. Recent and remote memories were intact. Concentration and focus: normal and the patient was able to stay on task. Tone, volume, and rate were intact. Mood and affect were congruently anxious but appropriate for the content of the session. Fund of knowledge was normal. Thought process was logical and linear. Insight and judgment were intact.       Objective:   Patient was able to participate and benefit from treatment as evidenced by her/his verbal expression and understanding of idea discussed. A cognitive behavioral modality was used.       New Symptoms or Complaints: The patient did not disclose any new symptoms to the undersigned.       Reason Patient is participating in the group: This session was necessary in order to teach the patient about PTSD sxs and how to manage them via new adaptive coping strategies.       Patient's response to current intervention: Patient was receptive of feedback given, supportive of other group members and appears to have benefited from the group process. No barriers to learning evidenced.       Progress toward short-term goals: The patient did not have any short-term goals since was the patient s introduction group session.       Patient's Impressions: The patient indicated readiness to learn by the patient s choice to attend group.       Are there any  new goals: There were no new goals brought to the undersigned s attention.       Patient education on the above topics was provided during this session. The patient was given an opportunity to ask questions and participate in the group discussion. The patient exhibited individual understanding by asking relevant questions and making appropriate comments to other group members.       Last session's practice exercises were reviewed and grounding exercises were initiated. The primary tasks for this session included psycho-education sleep disturbances. New skills introduced included improving sleep, sleep hygiene and sleep control technique. Patient agreed to complete the day log, night log and to review and fill out the daily diary card.     Diagnosis:  1. PTSD (post-traumatic stress disorder)    2. JAYLIN (generalized anxiety disorder)    3. Bipolar 1 disorder, depressed, mild    4. Cocaine dependence, in remission      Patient Denied SI, Plan and/or means.       Provider's Assessment: Patient evidenced the ability to understand how PTSD impacts a person s thoughts, feelings, and actions. The patient was active and participatory throughout group and participated in all group activities. The patient continues to be very appropriate for group therapy and appears to benefit from PTSD skills.       P: Continue Essential Skills for PTSD group to address PTSD sxs.       Follow-up: The patient also agreed to continue practicing the anger journal, progressive muscle relaxation, grounding, deep breathing, self-statements and daily diary card.      Discharge Criteria Planning: Patient reports that their sxs have decreased and the patient is ready for an evidenced based PTSD individual treatment (prolonged exposure for PTSD/CPT for PTSD).       Encounter performed and documented by FOREIGN Vieira
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
## 2021-06-11 NOTE — TELEPHONE ENCOUNTER
Start Effexor, trazodone and Vistaril at present dose.  Start Depakote 500 mg at night for a week then thousand milligrams at night for a week and then schedule appointment with me.  I do not see any appointment scheduled at this time.  She has enough medications from the order from Dorene 10, 2020 visit.

## 2021-06-11 NOTE — PROGRESS NOTES
MENTAL HEALTH VISIT NOTE    05/31/2017  Start time: 800   Stop Time: 845   Session # 8    Kandy Yañez is a 41 y.o. female being seen today for follow-up.    New symptoms or complaints: None    Functional Impairment:   Personal: 3  Family: 3  Work: 4  Social:3    The patient was on time for her scheduled session. Patient was pleasant and cooperative. She was oriented X4. She made appropriate eye contact. She was well dressed with good grooming and hygiene. Recent and remote memories were intact. Concentration and focus: Focused and on task. Speech (tone, volume, and rate) were intact. Mood and affect were congruently irritable but appropriate for the content of the session. Fund of knowledge was adequate. Thought process was logical and linear. Insight and judgment were intact.     Suicidal/Homicidal Ideation present: None Reported This Session    Patient's impression of their current status: Patient reported feeling irritable. Patient indicated she has not been able to get past this feeling. Patient talked about ways she has snapped on people and then the guilt she feels after. Patient indicated having a hard time identifying why she is feeling this way. Patient reported she does recognize that she is struggling with loneliness. Patient indicated knowing she is loved yet feeling alone at the same time.      Therapist impression of patients current state: Patient appears to have fair insight into her mental health. This therapist challenged patient on ways she is pushing herself too hard. This therapist processed with patient working on saying to herself what she would say to other people. This therapist challenged patient on ways she feels stuck in loneliness.     Progress toward short term goals: Progress as expected medication management with Dr. Betancourt, using coping skills, attending PTSD Skills returning to scheduled session.    Review of long term goals: Not done at today's session.     Diagnosis:   1. Mild  depressed bipolar I disorder    2. JAYLIN (generalized anxiety disorder)    3. PTSD (post-traumatic stress disorder)    4. Cocaine dependence, in remission      Plan and Follow up: Patient will return in one week for scheduled session to continue working on reducing mental health symptoms.  Patient will work on continuing to maintain sobriety. Patient will attend the PTSD Skills Group. Patient will continue to process her trauma. Patient will benefit from using assertive communication and not jumping to conclusions. Patient will benefit from continuing to use coping skills taught in session. Patient will benefit from self-care.     Discharge Criteria/Planning: Patient will continue with follow-up until therapy can be discontinued without return of signs and symptoms.    Encounter performed and documented by FOREIGN Vieira
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
## 2021-06-11 NOTE — PROGRESS NOTES
MENTAL HEALTH VISIT NOTE    06/22/2017  Start time: 0900   Stop Time: 0955  Session # 10    Kandy Yañez is a 41 y.o. female being seen today for follow-up.    New symptoms or complaints: None    Functional Impairment:   Personal: 3  Family: 3  Work: 4  Social:3    The patient was on time for her scheduled session. Patient was pleasant and cooperative. She was oriented X4. She made appropriate eye contact. She was well dressed with good grooming and hygiene. Recent and remote memories were intact. Concentration and focus: Focused and on task. Speech (tone, volume, and rate) were intact. Mood and affect were congruently irritated but appropriate for the content of the session. Fund of knowledge was adequate. Thought process was logical and linear. Insight and judgment were intact.     Suicidal/Homicidal Ideation present: None Reported This Session    Patient's impression of their current status: Patient reported feeling irritated. Patient indicated she is working to try and take care of herself yet other people getting in the way. Patient talked about how she can feel herself taking on too much at this time. Patient indicated knowing she needs to do self-care and working to try and find the time. Patient reported she has noticed how much her mental health is affected when she does not take time to herself. Patient indicated she is continuing to work on building trust in her relationship.    Therapist impression of patients current state: Patient appears to have fair insight into her mental health. This therapist challenged patient on ways she is continuing to put other people's needs before her own. This therapist processed with patient how this results in irritability and anger. This therapist challenged patient on what is underneath her anger. This therapist processed with patient the hurt she feels due to other people lying and not being there for her.     Progress toward short term goals: Progress as expected  medication management with Dr. Betancourt, using coping skills, attending PTSD Skills returning to scheduled session.    Review of long term goals: Not done at today's session.     Diagnosis:   1. PTSD (post-traumatic stress disorder)    2. Mild depressed bipolar I disorder    3. JAYLIN (generalized anxiety disorder)    4. Cocaine dependence, in remission      Plan and Follow up: Patient will return in one week for scheduled session to continue working on reducing mental health symptoms.  Patient will work on continuing to maintain sobriety. Patient will attend the PTSD Skills Group. Patient will continue to process her trauma. Patient will benefit from using assertive communication and not jumping to conclusions. Patient will benefit from continuing to use coping skills taught in session. Patient will benefit from self-care.     Discharge Criteria/Planning: Patient will continue with follow-up until therapy can be discontinued without return of signs and symptoms.    Encounter performed and documented by FOREIGN Vieira

## 2021-06-11 NOTE — PROGRESS NOTES
PTSD Skills Group Psychotherapy   CPT CODE: 33704   Frequency: Once a Week   START TIME: 1:00 STOP TIME: 2:00   Facilitator:  Jerri Chanel MA, [facility]   Date: 07/13/2017  N= 4    Topic: PTSD and Nightmares     Group Session #10    Subjective: The patient was seen today for a 60 minute PTSD Skills (Essential Skills for PTSD) session held at [facility] outpatient clinic.     Mental Status Exam:   The patient was on time for scheduled session. Patient was pleasant and cooperative. The patient was oriented X4. The patient made good eye contact. The patient was well dressed with good grooming and hygiene. Recent and remote memories were intact. Concentration and focus: normal and she was able to stay on task. Tone, volume, and rate were intact. Mood and affect were congruently anxious but appropriate for the content of the session. Fund of knowledge was normal. Thought process was logical and linear. Insight and judgment were intact.     Objective:   Patient was able to participate and benefit from treatment as evidenced by her/his verbal expression and understanding of idea discussed. A cognitive behavioral modality was used.     New Symptoms or Complaints: The patient did not disclose any new symptoms to the undersigned.     Reason Patient is participating in the group: This session was necessary in order to teach the patient about PTSD sxs and how to manage them via new adaptive coping strategies.     Patient's response to current intervention: Patient was receptive of feedback given, supportive of other group members and appears to have benefited from the group process. No barriers to learning evidenced.     Progress toward short-term goals: The patient did not have any short-term goals since was the patient s introduction group session.     Patient's Impressions: The patient indicated readiness to learn by the patient s choice to attend group.     Are there any new goals: There were no new goals brought  to the undersigned s attention.     Patient education on the above topics was provided during this session. The patient was given an opportunity to ask questions and participate in the group discussion. The patient exhibited individual understanding by asking relevant questions and making appropriate comments to other group members.   Last session's practice exercises were reviewed and grounding exercises were initiated. The primary tasks for this session included psychoeducation on nightmares, why we dream and characteristics of trauma-related nightmares. New skills introduced included rehearsal methods, discovering an action plan and what to expect.     A:   Diagnoses:  1. PTSD (post-traumatic stress disorder)    2. JAYLIN (generalized anxiety disorder)    3. Bipolar 1 disorder, depressed, mild    4. Cocaine dependence, in remission      Patient Denied SI, Plan and/or means.     Provider's Assessment: Patient evidenced the ability to understand how PTSD impacts a person s thoughts, feelings, and actions. The patient was active and participatory throughout group and participated in all group activities. The patient continues to be very appropriate for group therapy and appears to benefit from PTSD skills.     P: Continue Essential Skills for PTSD group to address PTSD sxs.     Follow-up: The patient also agreed to continue practicing the anger journal, progressive muscle relaxation, grounding, deep breathing, self-statements and daily diary card.    Discharge Criteria Planning: Patient reports that their sxs have decreased and the patient is ready for an evidenced based PTSD individual treatment (prolonged exposure for PTSD/CPT for PTSD).     Encounter documented by Jerri Chanel [facility]

## 2021-06-11 NOTE — PROGRESS NOTES
PTSD Skills Psychotherapy   Frequency: Once a Week   START TIME: 2:00pm STOP TIME: 3:00pm  Facilitator:  FOREIGN Vieira and DK Andrade  Date: 06/15/17  N= 6    Topic: Emotional Regulation and Upper Limits   Session #6    Subjective: The patient was seen today for a 60 minute PTSD Skills (Essential Skills for PTSD) session held at [facility] outpatient clinic.     Mental Status Exam:   The patient was on time for scheduled session. Patient was pleasant and cooperative. The patient was oriented X4. The patient made good eye contact. The patient was well dressed with good grooming and hygiene. Recent and remote memories were intact. Concentration and focus: normal and she was able to stay on task. Tone, volume, and rate were intact. Mood and affect were congruently anxious but appropriate for the content of the session. Fund of knowledge was normal. Thought process was logical and linear. Insight and judgment were intact.     Objective:   Patient was able to participate and benefit from treatment as evidenced by her verbal expression and understanding of idea discussed. A cognitive behavioral modality was used.       New Symptoms or Complaints: The patient did not disclose any new symptoms to the undersigned.   Reason Patient is participating in the group: This session was necessary in order to teach the patient about PTSD sxs and how to manage them via new adaptive coping strategies.     Patient's response to current intervention: Patient was receptive of feedback given. No barriers to learning evidenced.     Progress toward short-term goals: The patient completed and discussed their homework.     Patient's Impressions: The patient indicated readiness to learn by the patient s choice to attend session.     Are there any new goals: There were no new goals brought to the undersigned s attention.     Patient education on the above topics was provided during this session. The patient was given  an opportunity to ask questions and participate in the discussion. The patient exhibited individual understanding by asking relevant questions and making appropriate comments.    Last session's practice exercises were reviewed. The primary tasks for this session included understanding emotional regulations and upper limits, ways depression and PTSD interact together, feeling your feelings safely, mindfulness and assignment of homework.     A:     1. PTSD (post-traumatic stress disorder)    2. JAYLIN (generalized anxiety disorder)    3. Mild depressed bipolar I disorder                Patient Denied SI, Plan and/or means.     Provider's Assessment: Patient evidenced the ability to understand how PTSD impacts a person s thoughts, feelings, and actions. The patient was active and participatory throughout group and participated in all group activities. The patient continues to be very appropriate for group therapy and appears to benefit from PTSD skills.     P: Continue Essential Skills for PTSD to address PTSD sxs.     Follow-up: The patient also agreed to continue practicing the identification of her upper limits by identifying what might change if she starts to feel better and could she tolerate having good things happen in her life.     Discharge Criteria Planning: Patient reports that sxs have decreased and the patient is ready for an evidenced based PTSD individual treatment (prolonged exposure for PTSD/CPT for PTSD).           Patient Denied SI, Plan and/or means.     Provider's Assessment: Patient evidenced the ability to understand how PTSD impacts a person s thoughts, feelings, and actions. The patient was active and participatory throughout group and participated in all group activities. The patient continues to be very appropriate for group therapy and appears to benefit from PTSD skills.     P: Continue Essential Skills for PTSD to address PTSD sxs.     Follow-up: The patient also agreed to continue practicing  the identification of her upper limits by identifying what might change if she starts to feel better and could she tolerate having good things happen in her life.     Discharge Criteria Planning: Patient reports that her sxs have decreased and the patient is ready for an evidenced based PTSD individual treatment (prolonged exposure for PTSD/CPT for PTSD).     Monica Storm, LICSW
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
## 2021-06-11 NOTE — PROGRESS NOTES
Pt is here for psychiatric med management follow up. Currently under a lot of stress. She is taking care of her grandmother who is 80 y.o and has dementia. Her boyfriend is HIV positive, client said they had sex last time back in April . Her test was negative last week. Pt is following up with PCP provider for that. Doing PTSD groups and seeing Jerri. Topamax works for cravings.     Correct pharmacy verified with patient and confirmed in snapshot? [x] yes []no    Medications Phoned  to Pharmacy [] yes [x]no  Name of Pharmacist:  List Medications, including dose, quantity and instructions      Medication Prescriptions given to patient   [] yes  [x] no   List the name of the drug the prescription was written for.       Medications ordered this visit were e-scribed.  Verified by order class [x] yes  [] no  Depakote, Vistaril, Minipress, Topamax, Trazodone, and Effexor   Medication changes or discontinuations were communicated to patient's pharmacy: [] yes  [x] no    UA collected [] yes    [x] no    [place] Prescription Monitoring Program Reviewed? [] yes  [x] no    Referrals were made to: none     Future appointment was made: [x] yes  [] no  7/12/17  Dictation completed at time of chart check: [x] yes  [] no    I have checked the documentation for today s encounters and the above information has been reviewed and completed.

## 2021-06-11 NOTE — TELEPHONE ENCOUNTER
Pt called and reports she relapsed and needs to get back on her medication.  Next appt offered to her was 9/30 and pt reports she is unable to wait this long to be seen.  Would like a call back from RN staff to discuss possible RN appt or resuming medication.  Can be reached at [phone]

## 2021-06-11 NOTE — PROGRESS NOTES
PTSD Skills Group Psychotherapy   CPT CODE: 20502   Frequency: Once a Week   START TIME: 2:00PM   STOP TIME: 3:00PM   Facilitators: FOREIGN Vieira and SUSHANT Andrade, [facility]  Date: 5/25/2017  N= 8     Topic: Skills for Avoidance and Cognitive Priming   Group Session #4      Subjective: The patient was seen today for a 60 minute PTSD Skills (Essential Skills for PTSD)   session held at [facility] outpatient clinic.     Mental Status Exam:   The patient was on time for their scheduled session. Patient was pleasant and cooperative. The patient was oriented X4. The patient made good eye contact. The patient was well dressed with good grooming and hygiene. Recent and remote memories were intact. Concentration and focus: normal able to stay on task. Tone, volume, are normal and rate is somewhat pressured. Mood and affect were congruently anxious but appropriate for the content of the session. Fund of knowledge was normal. Thought process was logical and linear. Insight and judgment were intact.      Objective:   Patient was able to participate and benefit from treatment as evidenced by her verbal expression and understanding of idea discussed. A cognitive behavioral modality was used.      New Symptoms or Complaints: The patient did not disclose any new symptoms to the undersigned.      Reason Patient is participating in the group: This session was necessary in order to teach the patient about PTSD sxs and how to manage them via new adaptive coping strategies.      Patient's response to current intervention: Patient was receptive of feedback given, supportive of other group members and appears to have benefited from the group process. No barriers to learning evidenced.     Progress toward short-term goals: The patient reported understanding and occasional practice of assigned homework.     Patient's Impressions: The patient indicated readiness to learn by the patient s choice to attend group.       Are there any new goals: There were no new goals brought to the undersigned s attention.    Patient education on the above topics was provided during this session. The patient was given an opportunity to ask questions and participate in the group discussion. The patient exhibited individual understanding by asking relevant questions and making appropriate comments to other group members.   Last session's practice exercises were reviewed. The primary tasks for this session included an introduction to avoidance, psychoeducation and normalization of the experience, techniques to interrupt avoidance (thought stopping, grounding techniques, self-dialogue, and introduction to in-vivo exposure), and assignment of homework.      Diagnoses:   1. PTSD (post-traumatic stress disorder)    2. Mild depressed bipolar I disorder    3. JAYLIN (generalized anxiety disorder)    4. Cocaine dependence, in remission    5. Alcohol dependence in remission    6. Marijuana abuse in remission      Patient Denied SI, Plan and/or means.      Provider's Assessment: Patient evidenced the ability to understand how PTSD impacts a person s thoughts, feelings, and actions. The patient was active and participatory throughout group and participated in all group activities. The patient continues to be very appropriate for group therapy and appears to benefit from PTSD skills.      P: Continue Essential Skills for PTSD group to address PTSD sxs.      Follow-up: The patients agreed to monitor daily tension using the ES daily diary card. The patient also agreed to complete the distress and avoidance journal and to consider conducting some in-vivo exposure  .   Discharge Criteria Planning: Patient reports that sxs have decreased and the patient is ready for an evidenced based PTSD individual treatment (prolonged exposure for PTSD, CPT for PTSD, or other).     Encounter performed and documented by SUSHANT Andrade, DK

## 2021-06-11 NOTE — PROGRESS NOTES
PTSD Skills Group Psychotherapy   CPT CODE: 87515   Frequency: Once a Week   START TIME: 2:00 STOP TIME: 3:00PM  Facilitator: FOREIGN Vieira and SUSHANT Andrade, [facility]  Date: 06/29/2016  N= 5    Topic: Self-Blame, Guilt, and Shame    Group Session #8    Subjective: The patient was seen today for a 60 minute PTSD Skills (Essential Skills for PTSD) session held at [facility] outpatient clinic.    Mental Status Exam: The patient was on time for scheduled session. Patient was pleasant and cooperative. The patient was oriented X4. The patient made good eye contact. The patient was well dressed with good grooming and hygiene. Recent and remote memories were intact. Concentration and focus: normal and she was able to stay on task. Tone, volume, and rate were intact. Mood and affect were congruently anxious but appropriate for the content of the session. Fund of knowledge was normal. Thought process was logical and linear. Insight and judgment were intact.     Objective: Patient was able to participate and benefit from treatment as evidenced by verbal expression and understanding of idea discussed. A cognitive behavioral modality was used.     New Symptoms or Complaints: The patient did not disclose any new symptoms to the undersigned.     Reason Patient is participating in the group: This session was necessary in order to teach the patient about PTSD sxs and how to manage them via new adaptive coping strategies.     Patient's response to current intervention: Patient was receptive of feedback given, supportive of other group members and appears to have benefited from the group process. No barriers to learning evidenced.    Progress toward short-term goals: The patient completed and discussed part of the assigned homework.    Patient's Impressions: The patient indicated readiness to learn by the patient s choice to attend group.     Are there any new goals: There were no new goals brought to the  undersigned s attention.   Patient education on the above topics was provided during this session. The patient was given an opportunity to ask questions and participate in the group discussion. The patient exhibited individual understanding by asking relevant questions and making appropriate comments to other group members.     Last session's practice exercises were reviewed and grounding exercises were initiated. The primary tasks for this session included psychoeducation on self-blame, guilt, and shame. New skills introduced included cognitive restructuring and percentage of responsibility techniques. Patient agreed to complete the cognitive distortion worksheet and to review and fill out the daily diary card.     A:     1. PTSD (post-traumatic stress disorder)    2. JAYLNI (generalized anxiety disorder)    3. Bipolar 1 disorder, depressed, mild    4. Cocaine dependence, in remission          Patient Denied SI, Plan and/or means.     Provider's Assessment: Patient evidenced the ability to understand how PTSD impacts a person s thoughts, feelings, and actions. The patient was active and participatory throughout group and participated in all group activities. The patient continues to be very appropriate for group therapy and appears to benefit from PTSD skills.     P: Continue Essential Skills for PTSD group to address PTSD sxs.     Follow-up: The patient also agreed to continue practicing the identification of their upper limits by identifying what might change if they starts to feel better and could they tolerate having good things happen in their life.  .   Discharge Criteria Planning: Patient reports that their sxs have decreased and the patient is ready for an evidenced based PTSD individual treatment (prolonged exposure for PTSD/CPT for PTSD).    Encounter performed and documented by SUSHANT Andrade, [facility]

## 2021-06-12 NOTE — PROGRESS NOTES
MENTAL HEALTH VISIT NOTE    08/25/2017  Start time: 0700  Stop Time: 4865  Session # 13    Kandy Yañez is a 41 y.o. female being seen today for follow-up.    New symptoms or complaints: None    Functional Impairment:   Personal: 3  Family: 3  Work: 4  Social:3    The patient was on time for her scheduled session. Patient was pleasant and cooperative. She was oriented X4. She made appropriate eye contact. She was well dressed with good grooming and hygiene. Recent and remote memories were intact. Concentration and focus: Focused and on task. Speech (tone, volume, and rate) were intact. Mood and affect were congruently anxious but appropriate for the content of the session. Fund of knowledge was adequate. Thought process was logical and linear. Insight and judgment were intact.     Suicidal/Homicidal Ideation present: None Reported This Session    Patient's impression of their current status: Patient indicated feeling anxious. Patient reported continuing to have a lot of ups and downs. Patient talked about how she is working to put her needs first. Patient indicated she is trying to take care of herself and not feel guilty. Patient talked about her continued struggles in her relationship and ways this has affected her mood at times.     Therapist impression of patients current state: Patient appears to have fair insight into her mental health. This therapist processed with patient the benefits of putting her own needs first. This therapist processed with patient communication and what has been missing in her relationship.     Progress toward short term goals: Progress as expected medication management with Dr. Betancourt, using coping skills, attending PTSD Skills returning to scheduled session.    Review of long term goals: Not done at today's session.     Diagnosis:   1. PTSD (post-traumatic stress disorder)    2. JAYLIN (generalized anxiety disorder)    3. Bipolar 1 disorder, depressed, mild    4. Cocaine dependence, in  remission      Plan and Follow up: Patient will return in one week for scheduled session to continue working on reducing mental health symptoms.  Patient will work on continuing to maintain sobriety. Patient will attend the PTSD Skills Group. Patient will continue to process her trauma. Patient will benefit from using assertive communication and not jumping to conclusions. Patient will benefit from continuing to use coping skills taught in session. Patient will benefit from self-care.     Discharge Criteria/Planning: Patient will continue with follow-up until therapy can be discontinued without return of signs and symptoms.    Encounter performed and documented by FOREIGN Vieira
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
## 2021-06-12 NOTE — PROGRESS NOTES
PTSD Skills Group Psychotherapy    CPT CODE: 10104   Frequency: Once a Week   START TIME: 2pm STOP TIME: 3pm   Facilitator: FOREIGN Vieira and SUSHANT Andrade, [facility]  Date: 07/20/2017  N= 3    Topic: Wrap Up    Group Session #11     Subjective: The patient was seen today for a 60 minute PTSD Skills (Essential Skills for PTSD) session held at [facility] outpatient clinic.    Mental Status Exam: The patient was on time for her scheduled session. Patient was pleasant and cooperative. The patient was oriented X4. The patient made good eye contact. The patient was well dressed with good grooming and hygiene. Recent and remote memories were intact. Concentration and focus: normal and she was able to stay on task. Tone and rate were pressured and volume was slightly loud. Mood and affect were congruently anxious, somewhat tearful but appropriate for the content of the session. Fund of knowledge was normal. Thought process was logical and linear. Insight and judgment were intact.     Objective: Patient was able to participate and benefit from treatment as evidenced by her verbal expression and understanding of idea discussed. A cognitive behavioral modality was used.     New Symptoms or Complaints: The patient disclosed difficulty with closure of the group and associated loss.    Reason Patient is participating in the group: This session was necessary in order to teach the patient about PTSD sxs and how to manage them via new adaptive coping strategies.     Patient's response to current intervention: Patient was receptive of feedback given, supportive of other group members and appears to have benefited from the group process. No barriers to learning evidenced.     Progress toward short-term goals: The patient practiced homework from previous session.    Patient's Impressions: The patient indicated readiness to learn by the patient s choice to attend group.     Are there any new goals: There were no  new goals brought to the undersigned s attention.     Patient education on the above topics was provided during this session. The patient was given an opportunity to ask questions and participate in the group discussion. The patient exhibited individual understanding by asking relevant questions and making appropriate comments to other group members.     Last session's practice exercises were reviewed and grounding exercises were initiated. The primary tasks for this session included review of topics covered in the PTSD skills group. The group reviewed PTSD symptoms and where they fit in the 4 criteria's. Patients were to identify different coping skills they had been taught and how they fit in with thoughts, feelings and behaviors.     A:   1. PTSD (post-traumatic stress disorder)    2. JAYLIN (generalized anxiety disorder)    3. Bipolar 1 disorder, depressed, mild    4. Cocaine dependence, in remission          Provider's Assessment: Patient evidenced the ability to understand how PTSD impacts a person s thoughts, feelings, and actions. The patient was active and participatory throughout group and participated in all group activities. The patient continues to be very appropriate for group therapy and appears to benefit from PTSD skills.     P: Recommend this patient move to group therapy where she can process feelings.  She will continue work on trauma with her individual psychotherapist.    Follow-up: The patient also agreed to continue practicing the identification of their upper limits by identifying what might change if they starts to feel better and could they tolerate having good things happen in their life.  .   Discharge Criteria Planning: Patient reports that their sxs have decreased and the patient is ready for an evidenced based PTSD individual treatment (prolonged exposure for PTSD/CPT for PTSD).    Encounter performed and documented by SUSHANT Andrade, [facility]
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
## 2021-06-12 NOTE — PROGRESS NOTES
MENTAL HEALTH VISIT NOTE    09/08/2017  Start time: 907  Stop Time: 1000  Session # 14    Kandy Yañez is a 41 y.o. female being seen today for follow-up.    New symptoms or complaints: None    Functional Impairment:   Personal: 3  Family: 3  Work: 4  Social:3    The patient was on time for her scheduled session. Patient was pleasant and cooperative. She was oriented X4. She made appropriate eye contact. She was well dressed with good grooming and hygiene. Recent and remote memories were intact. Concentration and focus: Focused and on task. Speech (tone, volume, and rate) were intact. Mood and affect were congruently depressed but appropriate for the content of the session. Fund of knowledge was adequate. Thought process was logical and linear. Insight and judgment were intact.     Suicidal/Homicidal Ideation present: None Reported This Session    Patient's impression of their current status: Patient reported feeling depressed. Patient indicated she is noticing a pattern of depression occurring around her menstrual cycle. Patient talked about during this time shutting down. Patient indicated she isolates and does not attend meetings. Patient talked about feeling the need to move forward in life. Patient indicated feeling this may mean returning to work. Patient talked about how work has been positive and negative for her.      Therapist impression of patients current state: Patient appears to have fair insight into her mental health. This therapist encouraged patient to meet with her doctor related to her menstrual cycle concerns. This therapist processed with patient ways she can decrease her depression symptoms and isolation. This therapist processed with patient the pros and cons of returning to work.    Progress toward short term goals: Progress as expected medication management with Dr. Betancourt, using coping skills, attending PTSD Skills returning to scheduled session.    Review of long term goals: Not done at  today's session.     Diagnosis:   1. PTSD (post-traumatic stress disorder)    2. JAYLIN (generalized anxiety disorder)    3. Bipolar 1 disorder, depressed, mild    4. Cocaine dependence, in remission      Plan and Follow up: Patient will return in one week for scheduled session to continue working on reducing mental health symptoms.  Patient will work on continuing to maintain sobriety. Patient will attend the PTSD Skills Group. Patient will continue to process her trauma. Patient will benefit from using assertive communication and not jumping to conclusions. Patient will benefit from continuing to use coping skills taught in session. Patient will benefit from self-care. Patient should looks at the pros and cons of returning to work    Discharge Criteria/Planning: Patient will continue with follow-up until therapy can be discontinued without return of signs and symptoms.    Encounter performed and documented by FOREIGN Vieira
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
## 2021-06-12 NOTE — PROGRESS NOTES
MENTAL HEALTH VISIT NOTE    07/28/2017  Start time: 1005   Stop Time: 1100  Session # 12    Kandy Yañez is a 41 y.o. female being seen today for follow-up.    New symptoms or complaints: None    Functional Impairment:   Personal: 3  Family: 3  Work: 4  Social:3    The patient was on time for her scheduled session. Patient was pleasant and cooperative. She was oriented X4. She made appropriate eye contact. She was well dressed with good grooming and hygiene. Recent and remote memories were intact. Concentration and focus: Focused and on task. Speech (tone, volume, and rate) were intact. Mood and affect were congruently sad but appropriate for the content of the session. Fund of knowledge was adequate. Thought process was logical and linear. Insight and judgment were intact.     Suicidal/Homicidal Ideation present: None Reported This Session    Patient's impression of their current status: Patient reported feeling sad. Patient indicated she is continuing to have struggles in her relationship. Patient talked about trying to ways she has noticed her self-esteem is affected. Patient indicated she is working hard to better herself and continue to move forward positively. Patient talked about knowing the importance of putting herself first and needing to care for herself.     Therapist impression of patients current state: Patient appears to have fair insight into her mental health. This therapist challenged patient on ways she neglects her own needs in her relationship. This therapist processed with patient ways she can work on bettering herself and how this will help her self-esteem.     Progress toward short term goals: Progress as expected medication management with Dr. Betancourt, using coping skills, attending PTSD Skills returning to scheduled session.    Review of long term goals: Not done at today's session.     Diagnosis:   1. PTSD (post-traumatic stress disorder)    2. JAYLIN (generalized anxiety disorder)    3.  Bipolar 1 disorder, depressed, mild    4. Cocaine dependence, in remission      Plan and Follow up: Patient will return in one week for scheduled session to continue working on reducing mental health symptoms.  Patient will work on continuing to maintain sobriety. Patient will attend the PTSD Skills Group. Patient will continue to process her trauma. Patient will benefit from using assertive communication and not jumping to conclusions. Patient will benefit from continuing to use coping skills taught in session. Patient will benefit from self-care.     Discharge Criteria/Planning: Patient will continue with follow-up until therapy can be discontinued without return of signs and symptoms.    Encounter performed and documented by FOREIGN Vieira

## 2021-06-13 NOTE — PROGRESS NOTES
MENTAL HEALTH VISIT NOTE    10/18/2017  Start time: 1003 Stop Time: 1100  Session # 17    Kandy Yañez is a 42 y.o. female being seen today for follow-up.    New symptoms or complaints: None    Functional Impairment:   Personal: 3  Family: 3  Work: 4  Social:3    Mental statue: Patient presented on time for her scheduled session. Patient was pleasant and cooperative. She was oriented X4. She made appropriate eye contact. She was well dressed with good grooming and hygiene. Recent and remote memories were intact. Concentration and focus: Focused and on task. Speech (tone, volume, and rate) were intact. Mood and affect were congruently happy but appropriate for the content of the session. Fund of knowledge was adequate. Thought process was logical and linear. Insight and judgment were intact.     Suicidal/Homicidal Ideation present: None Reported This Session    Patient's impression of their current status: Patient reported feeling great. Patient indicated she has started a new routine and is feeling energized. Patient talked about setting strict boundaries with her grandma. Patient reported this has helped her mood as well. Patient indicated she is realizing she was not taking enough time for herself and putting everyone else's needs before her own.    Therapist impression of patients current state: Patient appears to have fair insight into her mental health. This therapist challenged patient on ways she can continue to stick to this routine. This therapist processed with patient the importance of self-care.     Progress toward short term goals: Progress as expected medication management with Dr. Betancourt, using coping skills, attending outpatient group, noticing triggers to mental health symptoms and returning to scheduled session.    Review of long term goals: Not done at today's session.     Diagnosis:   1. Bipolar 1 disorder, depressed, mild    2. PTSD (post-traumatic stress disorder)    3. JAYLIN (generalized  anxiety disorder)      Plan and Follow up: Patient will return in one week for scheduled session to continue working on reducing mental health symptoms.  Patient will work on continuing to maintain sobriety. Patient will attend the PTSD Skills Group. Patient will continue to process her trauma. Patient will benefit from using assertive communication and not jumping to conclusions. Patient will benefit from continuing to use coping skills taught in session. Patient will benefit from self-care. Patient should looks at the pros and cons of returning to work    Discharge Criteria/Planning: Patient will continue with follow-up until therapy can be discontinued without return of signs and symptoms.    Encounter performed and documented by FOREIGN Vieira

## 2021-06-13 NOTE — PROGRESS NOTES
MENTAL HEALTH VISIT NOTE    09/22/2017  Start time: 902  Stop Time: 1000  Session # 15    Kandy Yañez is a 41 y.o. female being seen today for follow-up.    New symptoms or complaints: None    Functional Impairment:   Personal: 3  Family: 3  Work: 4  Social:3    The patient was on time for her scheduled session. Patient was pleasant and cooperative. She was oriented X4. She made appropriate eye contact. She was well dressed with good grooming and hygiene. Recent and remote memories were intact. Concentration and focus: Focused and on task. Speech (tone, volume, and rate) were intact. Mood and affect were congruently depressed but appropriate for the content of the session. Fund of knowledge was adequate. Thought process was logical and linear. Insight and judgment were intact.     Suicidal/Homicidal Ideation present: None Reported This Session    Patient's impression of their current status: Patient reported continuing to feel depressed and irritable. Patient talked about her continuing to try and monitor triggers to her mood. Patient indicated her relationship with her grandma being a huge stressor. Patient reported she is seeking more and more attention which gives patient very little time for herself. Patient indicated she has not been to a meeting for a while due to not wanting to hear her grandma complain. Patient talked about how that makes her feel bitter towards her grandma and knowing this is not good for her recovery.      Therapist impression of patients current state: Patient appears to have fair insight into her mental health. This therapist challenged patient on ways she continues to struggle with self-care. This therapist processed with patient what is important in her life and ways she can work on her self. This therapist challenged patient on ways she can work on asking for help.     Progress toward short term goals: Progress as expected medication management with Dr. Betancourt, using coping skills,  attending PTSD Skills returning to scheduled session.    Review of long term goals: Not done at today's session.     Diagnosis:   1. PTSD (post-traumatic stress disorder)    2. JAYLIN (generalized anxiety disorder)    3. Bipolar 1 disorder, depressed, mild    4. Cocaine dependence, in remission      Plan and Follow up: Patient will return in one week for scheduled session to continue working on reducing mental health symptoms.  Patient will work on continuing to maintain sobriety. Patient will attend the PTSD Skills Group. Patient will continue to process her trauma. Patient will benefit from using assertive communication and not jumping to conclusions. Patient will benefit from continuing to use coping skills taught in session. Patient will benefit from self-care. Patient should looks at the pros and cons of returning to work    Discharge Criteria/Planning: Patient will continue with follow-up until therapy can be discontinued without return of signs and symptoms.    Encounter performed and documented by FOREIGN Vieira
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
## 2021-06-13 NOTE — PROGRESS NOTES
Correct pharmacy verified with patient and confirmed in snapshot? [x] yes []no    Medications Phoned  to Pharmacy [] yes [x]no  Name of Pharmacist:  List Medications, including dose, quantity and instructions      Medication Prescriptions given to patient   [] yes  [x] no   List the name of the drug the prescription was written for.      Medications ordered this visit were e-scribed.  Verified by order class [x] yes  [] no  Hydroxyzine, Prazosin, Trazodone, Effexor, Depakote    Medication changes or discontinuations were communicated to patient's pharmacy:  [x] yes  [] no  Pharmacist Spoke With: Thaddeus regarding Correct Depakote, Extended release.    UA collected [] yes  [x] no    [place] Prescription Monitoring Program Reviewed? [x] yes  [] no    Referrals/Labs were made to:     Completed Charge Capture?  [x] yes  [] no    Future appointment was made: [x] yes  [] no  12/6/17  Dictation completed at time of chart check: [] yes  [x] no    I have checked the documentation for today s encounters and the above information has been reviewed and completed.
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
## 2021-06-13 NOTE — PROGRESS NOTES
MENTAL HEALTH VISIT NOTE    10/04/2017  Start time: 930 Stop Time: 1000  Session # 16    Kandy Yañez is a 42 y.o. female being seen today for follow-up.    New symptoms or complaints: None    Functional Impairment:   Personal: 3  Family: 3  Work: 4  Social:3    Mental statue: Patient presented late for her scheduled session due to meeting with doctor prior to this appointment. Patient was pleasant and cooperative. She was oriented X4. She made appropriate eye contact. She was well dressed with good grooming and hygiene. Recent and remote memories were intact. Concentration and focus: Focused and on task. Speech (tone, volume, and rate) were intact. Mood and affect were congruently irritable but appropriate for the content of the session. Fund of knowledge was adequate. Thought process was logical and linear. Insight and judgment were intact.     Suicidal/Homicidal Ideation present: None Reported This Session    Patient's impression of their current status: Patient indicated feeling irritable. Patient reported she plans to meet with her doctor to discuss struggles with her menstrual cycles. Patient indicated she is continuing to work on moving forward in life. Patient reported trying to decide if she is ready to return to work. Patient talked about knowing she needs to keep her recovery close and not get lost in work. Patient indicated knowing she would need a job that is low stress. Patient reported knowing that she needs to pay attention to her stress level and ways it affects her mental health.      Therapist impression of patients current state: Patient appears to have fair insight into her mental health. This therapist processed with patient goals she wants to achieve in the next 30 and 60 days. This therapist challenged patient on ways she is hesitate to make change in her life at this time.     Progress toward short term goals: Progress as expected medication management with Dr. Betancourt, using coping skills,  attending outpatient group, noticing triggers to mental health symptoms and returning to scheduled session.    Review of long term goals: Not done at today's session.     Diagnosis:   1. PTSD (post-traumatic stress disorder)    2. JAYLIN (generalized anxiety disorder)    3. Bipolar 1 disorder, depressed, mild    4. Cocaine dependence in early, early partial, sustained full, or sustained partial remission      Plan and Follow up: Patient will return in one week for scheduled session to continue working on reducing mental health symptoms.  Patient will work on continuing to maintain sobriety. Patient will attend the PTSD Skills Group. Patient will continue to process her trauma. Patient will benefit from using assertive communication and not jumping to conclusions. Patient will benefit from continuing to use coping skills taught in session. Patient will benefit from self-care. Patient should looks at the pros and cons of returning to work    Discharge Criteria/Planning: Patient will continue with follow-up until therapy can be discontinued without return of signs and symptoms.    Encounter performed and documented by FOREIGN Vieira
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
## 2021-06-14 NOTE — PROGRESS NOTES
[facility] SUBSTANCE USE DISORDER  DISCHARGE SUMMARY            NAME:  Kandy Yañez   Physician:  Ursula Stapleton PA-C   MRN:  [id number] :  Donald Welander, [facility]   #:  [id number] Funding Source:  [employer]   Admit Date: 17 Discharge Date: 2017     :  1975 Hours Completed: 105 hours MICD IOP, 72 hours Relapse Prevention   Initial Diagnosis:  Cocaine Use Disorder, Severe, F14.20 Final Diagnosis:  Cocaine Use Disorder, Severe, F14.20, in early remission   Discharge Address:     [facility] [id number]      Discharge Type:  With Staff Approval (WSA)    Reasons for and circumstances of service termination:  Kandy has successfully completed both MICD IOP and the Relapse Prevention aftercare program. Pt met all treatment attendance, engagement, and abstinence expectations and is discharged on this date, 2017, WSA.     Kandy was a pleasure to have in group sessions.        Dimension/Course of Treatment/Individualized Care:   1.  Withdrawal Potential - Risk level -  0  Treatment plan goals and progress towards those goals: Met  Pt reports last use 2016. Patient reports no withdrawal symptoms.     2.  Biomedical Conditions and Complications - Risk level -  0  Treatment plan goals and progress towards those goals: met  Patient reports health concern of sciatic pain, reports having a primary care physician (Dr. Stapleton, [facility]), and has been referred to a neurologist. Patient reports taking medications as prescribed. Pt reports she continues to take HIV prevention meds due to SO possibly exposing her to HIV.      3.  Emotional/Behavioral/Cognitive Conditions and Complications - Risk level -  1  Treatment plan goals and progress towards those goals: Making progress  Patient reports history of mental health diagnosis of depression, anxiety, bipolar I disorder and PTSD. Patient reports receiving psychiatry and individual psychotherapy in [facility] clinic and is happy  with these services. Patient reports significant history of abuse.     4.  Treatment Acceptance/Resistance - Risk Level -  0  Treatment plan goals and progress towards those goals: Met  Pt reports a strong desire to live a sober lifestyle and met or exceeded all treatment expectations. Pt was an asset to her peers in group sessions.      5.  Relapse/Continued Use/Continued Problem Potential - Risk level -  1  Treatment plan goals and progress towards those goals: Met  Pt remains vulnerable to relapse due to history of return to use and mental health greatly affects Pt's impulsive use. Pt made significant progress with developing sober coping skills to avoid using substances. Pt is approaching a year of sober living.      6.  Recovery Environment - Risk level -  2  Treatment plan goals and progress towards those goals: making progress  Patient reports that she is currently unemployed and lives with her significant other and grandmother, reports helping grandmother around the house. Patient reports she is currently on probation through the [facility] court. Pt reports she may have an upcoming job opportunity. Pt has strong connection to the  community and attends groups regularly.      Strengths: Smart, kind, supportive of peers, good sense of humor and good work ethic.   Needs: Remain abstinent from all substances of abuse, continue with mental health appointments, and continue to stay connected with sober peers.    Services Provided: Intake, assessment, treatment planning, education, group discussion, film, lectures, 1x1 therapy, and recommendations at discharge.            Program Involvement: Good  Attendance: Good  Ability to relate in group/   Other program activities: Excellent  Assignment Completion: Good  Overall Behavior: Good  Reported Family/Significant   Other Involvement: Fair    Prognosis: Good      Recommendations       Remain abstinent from all substances of abuse, find part-time work,  comply with probationary obligations, and continue to stay connected with sober peers.      Mental Health Referral  Individual Therapy and Med Compliance      Physical Health Referral:  Defer to MD         Counselor Name and Title:  Donald Welander, [facility]        Date:  12/1/2017  Time:  2:27 PM

## 2021-06-14 NOTE — PROGRESS NOTES
Weekly Progress Note  Kandy Yañez  1975  [id number]    D) Pt attended 1 groups this week with 0 absences. Patient attended 0 individual sessions this week.   A) Staff facilitated groups and reviewed tx progress. Assessed for [facility]. R) No VAP needed at this time. Pt working on the following dimensions:  Any significant events, defines as events that impact patients relationship with others inside and outside of treatment No    Indicate any changes or monitoring of physical or mental health problems Yes, depression symptoms increased.     Indicate involvement by any outside supports Yes: AA.    IAPP reviewed and modified as needed. NA  Dimension #1 - Withdrawal Potential - Risk 0. Pt reports last use 12/5/2016. Patient reports no withdrawal symptoms.  Specific goals from treatment plan addressed this week:  Maintain abstinence while attending Relapse Prevention as a way of gaining awareness of your thoughts, feelings and aspirations for recovery. Report any relapses, if any, on any substances of abuse to staff immediately.   Effectiveness of strategies:  Pt displays no signs or symptoms of intoxication or withdrawal.     Dimension #2 - Biomedical - Risk 0. Patient reports health concern of sciatic pain, reports having a primary care physician (Dr. Stapleton, [facility]), and has been referred to a neurologist. Patient reports taking medications as prescribed. Pt reports she continues to take HIV prevention meds due to SO possibly exposing her to HIV.   Specific goals from treatment plan addressed this week:  Get proper rest, nutrition, and exercise in order to promote good brain and body function. Inform staff immediately of any changes in your health that may affect your active participation in group therapy or attendance.  Effectiveness of strategies: Pt reports no new concerns.    Dimension #3 - Emotional/Behavioral/Cognitive - Risk 1. Patient reports history of mental health diagnosis of depression,  anxiety, bipolar I disorder and PTSD. Patient reports receiving psychiatry and individual psychotherapy in [facility] and is happy with these services. Patient reports significant history of abuse.  Specific goals from treatment plan addressed this week:  Remain medication and therapy compliant.  Report to staff any changes in your mental health that may affect your attendance or participation in group therapy.   Effectiveness of strategies:  Pt reports she has been very busy with grandma at the nursing home. Pt reported her depression was stronger this week and she spent a couple of days in bed.     Dimension #4 - Treatment Acceptance/Resistance - Risk 0. Pt has internal and external motivation for treatment.  Specific goals from treatment plan addressed this week:  Attend Relapse Prevention groups Tuesdays 9:30am to 12:30pm and share thoughts, feelings and urges to use, as well as sober supports with staff and peers.  Effectiveness of strategies:  Pt was active in group session and supportive of peers as usual. Pt reports continued benefit from the group sessions, but is also in agreement with staff that it is time to move on soon.     Dimension #5 - Relapse Potential - Risk 2. Pt reports decreased urges than previous weeks, partially attributes this to medication. Pt appears to have gained some skills during time in [facility] to cope with triggers and urges. Pt will need to continue to apply these skills to everyday life.  Specific goals from treatment plan addressed this week:  Dealing effectively with relapse triggers and stressors while building coping skills in order to handle life events without resorting to drug/alcohol use.   Effectiveness of strategies:  Pt reported mild triggers this week and was able to cope by playing the tape through.     Dimension #6 - Recovery Environment - Risk 2. Patient reports that she is currently unemployed and lives with her significant other and grandmother, reports  helping grandmother around the house. Patient reports she is currently on probation through the [facility] court. Pt reports life with grandma has been more manageable.   Specific goals from treatment plan addressed this week:  Find 2 sober support groups you feel comfortable attending on a weekly basis and inform counselor how these meetings are impacting you.   Effectiveness of strategies: Pt reports she attended AA. Pt is also continuing to spend time with her grandma at the nursing home. Pt expects grandma to moved back home on 11/22/17.     T) Treatment plan updated Yes.  Patient notified and in agreement Yes.  Patient educated on Mindfulness. Patient has completed 105 hours of MICD IOP. Pt has completed 69 hours of Relapse Prevention aftercare.  Projected discharge date is 12/1/17. Current discharge plan is continue NA attendance and 1x1 therapy, possibly another PTSD MH group.     Donald Welander, [facility]  11/21/2017, 3:44 PM      Relapse Prevention Aftercare  Psycho-Educational Curriculum  Date Attended  Mindfulness Curriculum  Date Attended    Crossing over into Sober Living--Pat Alcoholism h/o  Neurobiology of Mindfulness    Staying motivated in Long Term Recovery-h/o  Core Attitudes    Keeping it Simple-h/o  Daily practice    Relapse Prevention Quiz-h/o  Awareness    Prioritizing Sober Living-24hours a day h/o 11/7/17 Thoughts 11/14/17   Symptoms of Relapse-h/o 10/17/17 Emotions    Tools for Recovery-h/o  Physical body     Acceptance-group exercise  Intention    Finding gratitude  ESTELA    Building sober habits  Gratitude 11/21/17   Sober support groups  Optimism    Have a Relapse Prevention Plan-written  Happiness

## 2021-06-14 NOTE — PROGRESS NOTES
Kandy Yañez attended 1 hours of group therapy today.    11/7/2017 3:51 PM Donald Welander   Pt. arrived to cardiac cath lab at 1300 as STEMI w/ PCI; transferred to cath lab recovery area; awaiting transfer to CCU prior to above finding.

## 2021-06-14 NOTE — PROGRESS NOTES
Correct pharmacy verified with patient and confirmed in snapshot? [x] yes []no    Charge captured ? [x] yes  [] no    Medications Phoned  to Pharmacy [] yes [x]no  Name of Pharmacist:  List Medications, including dose, quantity and instructions      Medication Prescriptions given to patient   [] yes  [x] no   List the name of the drug the prescription was written for.       Medications ordered this visit were e-scribed.  Verified by order class [x] yes  [] no    Medication changes or discontinuations were communicated to patient's pharmacy: [x] yes  [] no  Trazodone 100 mg    UA collected [] yes  [x] no    [place] Prescription Monitoring Program Reviewed? [] yes  [x] no, no medication to report on the [place] .    Referrals were made to:  NA    Future appointment was made: [x] yes  [] no    Dictation completed at time of chart check: [] yes  [x] no    I have checked the documentation for today s encounters and the above information has been reviewed and completed.

## 2021-06-14 NOTE — PROGRESS NOTES
Reason for visit - follow-up.  Things overall good, has just hit the 1 year ade for sobriety.  Caring for Grandma and this can be challenging.   Sleep - good, but deprived due to helping Grandma, needs the Trazodone to be in a 50 mg dose.  Trying to get a schedule.  Encouraged to look at respite help with Grandma.  Depression - comes and goes.  Week before was really depressed, had menses, and may have been associated with that, and the stress with Grandma, and is it the bipolar.  Will do good and then burnt out.   Anxiety - high, alot  Panic attacks - none  SI/HI - no  Therapist - Jerri   Side effects from medication - none    No medication to report on the [place] .

## 2021-06-14 NOTE — PROGRESS NOTES
This therapist contacted patient and patient indicated she forgot about her appointment. Patient is scheduled to meet with this therapist on 12/20/17.

## 2021-06-14 NOTE — PROGRESS NOTES
Weekly Progress Note  Kandy Yañez  1975  [id number]    D) Pt attended 1 groups this week with 0 absences. Patient attended 0 individual sessions this week.   A) Staff facilitated groups and reviewed tx progress. Assessed for [facility]. R) No VAP needed at this time. Pt working on the following dimensions:  Any significant events, defines as events that impact patients relationship with others inside and outside of treatment Yes: grandmother in nursing home due to injury    Indicate any changes or monitoring of physical or mental health problems No    Indicate involvement by any outside supports Yes: AA, plus an AA speaker jam.     IAPP reviewed and modified as needed. NA  Dimension #1 - Withdrawal Potential - Risk 0. Pt reports last use 12/5/2016. Patient reports no withdrawal symptoms.  Specific goals from treatment plan addressed this week:  Maintain abstinence while attending Relapse Prevention as a way of gaining awareness of your thoughts, feelings and aspirations for recovery. Report any relapses, if any, on any substances of abuse to staff immediately.   Effectiveness of strategies:  Pt displays no signs or symptoms of intoxication or withdrawal.     Dimension #2 - Biomedical - Risk 0. Patient reports health concern of sciatic pain, reports having a primary care physician (Dr. Stapleton, [facility]), and has been referred to a neurologist. Patient reports taking medications as prescribed. Pt reports she continues to take HIV prevention meds due to SO possibly exposing her to HIV.   Specific goals from treatment plan addressed this week:  Get proper rest, nutrition, and exercise in order to promote good brain and body function. Inform staff immediately of any changes in your health that may affect your active participation in group therapy or attendance.  Effectiveness of strategies: Pt reports no new concerns.    Dimension #3 - Emotional/Behavioral/Cognitive - Risk 1. Patient reports history of mental  health diagnosis of depression, anxiety, bipolar I disorder and PTSD. Patient reports receiving psychiatry and individual psychotherapy in [facility] and is happy with these services. Patient reports significant history of abuse.  Specific goals from treatment plan addressed this week:  Remain medication and therapy compliant.  Report to staff any changes in your mental health that may affect your attendance or participation in group therapy.   Effectiveness of strategies:  Pt reports she has been very busy with grandma at the nursing home. Pt was upbeat about her recovery and reported no notable low points.     Dimension #4 - Treatment Acceptance/Resistance - Risk 0. Pt has internal and external motivation for treatment.  Specific goals from treatment plan addressed this week:  Attend Relapse Prevention groups Tuesdays 9:30am to 12:30pm and share thoughts, feelings and urges to use, as well as sober supports with staff and peers.  Effectiveness of strategies:  Pt was active in group session and supportive of peers as usual. Pt reports continued benefit from the group sessions, but is also in agreement with staff that it is time to move on soon.     Dimension #5 - Relapse Potential - Risk 2. Pt reports decreased urges than previous weeks, partially attributes this to medication. Pt appears to have gained some skills during time in [facility] to cope with triggers and urges. Pt will need to continue to apply these skills to everyday life.  Specific goals from treatment plan addressed this week:  Dealing effectively with relapse triggers and stressors while building coping skills in order to handle life events without resorting to drug/alcohol use.   Effectiveness of strategies:  Pt reported no notable triggers this week.     Dimension #6 - Recovery Environment - Risk 2. Patient reports that she is currently unemployed and lives with her significant other and grandmother, reports helping grandmother around the  house. Patient reports she is currently on probation through the [facility] G[facility] court. Pt reports life with grandma has been more manageable.   Specific goals from treatment plan addressed this week:  Find 2 sober support groups you feel comfortable attending on a weekly basis and inform counselor how these meetings are impacting you.   Effectiveness of strategies: Pt reports she attended AA including a speaker francie. Pt is also continuing to spend time with her grandma at the nursing home. Pt expects grandma to moved back home soon.     T) Treatment plan updated Yes.  Patient notified and in agreement Yes.  Patient educated on Mindfulness. Patient has completed 105 hours of MICD IOP. Pt has completed 66 hours of Relapse Prevention aftercare.  Projected discharge date is 12/1/17. Current discharge plan is continue NA attendance and 1x1 therapy, possibly another PTSD MH group.     Donald Welander, LADC  11/17/2017, 11:57 AM      Relapse Prevention Aftercare  Psycho-Educational Curriculum  Date Attended  Mindfulness Curriculum  Date Attended    Crossing over into Sober Living--Pat Alcoholism h/o  Neurobiology of Mindfulness    Staying motivated in Long Term Recovery-h/o  Core Attitudes    Keeping it Simple-h/o  Daily practice    Relapse Prevention Quiz-h/o  Awareness    Prioritizing Sober Living-24hours a day h/o 11/7/17 Thoughts 11/14/17   Symptoms of Relapse-h/o 10/17/17 Emotions    Tools for Recovery-h/o  Physical body     Acceptance-group exercise  Intention    Finding gratitude  C.AJACQUE.    Building sober habits  Clarity    Sober support groups  Optimism    Have a Relapse Prevention Plan-written  Happiness

## 2021-06-14 NOTE — PROGRESS NOTES
Weekly Progress Note  Kandy Yañez  1975  [id number]    D) Pt attended 1 groups this week with 0 absences. Patient attended 0 individual sessions this week.   A) Staff facilitated groups and reviewed tx progress. Assessed for [facility]. R) No VAP needed at this time. Pt working on the following dimensions:  Any significant events, defines as events that impact patients relationship with others inside and outside of treatment Yes: grandmother in nursing home due to injury    Indicate any changes or monitoring of physical or mental health problems No    Indicate involvement by any outside supports Yes: AA, but not as frequent    IAPP reviewed and modified as needed. NA  Dimension #1 - Withdrawal Potential - Risk 0. Pt reports last use 12/5/2016. Patient reports no withdrawal symptoms.  Specific goals from treatment plan addressed this week:  Maintain abstinence while attending Relapse Prevention as a way of gaining awareness of your thoughts, feelings and aspirations for recovery. Report any relapses, if any, on any substances of abuse to staff immediately.   Effectiveness of strategies:  Pt displays no signs or symptoms of intoxication or withdrawal.     Dimension #2 - Biomedical - Risk 0. Patient reports health concern of sciatic pain, reports having a primary care physician (Dr. Stapleton, [facility]), and has been referred to a neurologist. Patient reports taking medications as prescribed. Pt reports she continues to take HIV prevention meds due to SO possibly exposing her to HIV.   Specific goals from treatment plan addressed this week:  Get proper rest, nutrition, and exercise in order to promote good brain and body function. Inform staff immediately of any changes in your health that may affect your active participation in group therapy or attendance.  Effectiveness of strategies: Pt reports no new concerns.    Dimension #3 - Emotional/Behavioral/Cognitive - Risk 1. Patient reports history of mental health  diagnosis of depression, anxiety, bipolar I disorder and PTSD. Patient reports receiving psychiatry and individual psychotherapy in [facility] and is happy with these services. Patient reports significant history of abuse.  Specific goals from treatment plan addressed this week:  Remain medication and therapy compliant.  Report to staff any changes in your mental health that may affect your attendance or participation in group therapy.   Effectiveness of strategies:  Pt reports she has been very busy with grandma at the nursing home and feels somewhat down about just living on fast food.     Dimension #4 - Treatment Acceptance/Resistance - Risk 0. Pt has internal and external motivation for treatment.  Specific goals from treatment plan addressed this week:  Attend Relapse Prevention groups Tuesdays 9:30am to 12:30pm and share thoughts, feelings and urges to use, as well as sober supports with staff and peers.  Effectiveness of strategies:  Pt was active in group session and supportive of peers as usual. Pt reports continued benefit from the group sessions, but is also in agreement with staff that it is time to move on soon.     Dimension #5 - Relapse Potential - Risk 2. Pt reports decreased urges than previous weeks, partially attributes this to medication. Pt appears to have gained some skills during time in [facility] to cope with triggers and urges. Pt will need to continue to apply these skills to everyday life.  Specific goals from treatment plan addressed this week:  Dealing effectively with relapse triggers and stressors while building coping skills in order to handle life events without resorting to drug/alcohol use.   Effectiveness of strategies:  Pt reported no notable triggers this week.     Dimension #6 - Recovery Environment - Risk 2. Patient reports that she is currently unemployed and lives with her significant other and grandmother, reports helping grandmother around the house. Patient reports  she is currently on probation through the [employer] court. Pt reports life with grandma has been more manageable.   Specific goals from treatment plan addressed this week:  Find 2 sober support groups you feel comfortable attending on a weekly basis and inform counselor how these meetings are impacting you.   Effectiveness of strategies: Pt reports she has had some AA attendance, but has been spending most of her time with grandma at the nursing home.     T) Treatment plan updated Yes.  Patient notified and in agreement Yes.  Patient educated on Prioritizing sober living. Patient has completed 105 hours of MICD IOP. Pt has completed 62 hours of Relapse Prevention aftercare.  Projected discharge date is 12/1/17. Current discharge plan is continue NA attendance and 1x1 therapy, possibly another PTSD MH group.     Donald Welander, [facility]  11/10/2017, 1:14 PM      Relapse Prevention Aftercare  Psycho-Educational Curriculum  Date Attended  Mindfulness Curriculum  Date Attended    Crossing over into Sober Living--Pat Alcoholism h/o  Neurobiology of Mindfulness    Staying motivated in Long Term Recovery-h/o  Core Attitudes    Keeping it Simple-h/o  Daily practice    Relapse Prevention Quiz-h/o  Awareness    Prioritizing Sober Living-24hours a day h/o 11/7/17 Thoughts    Symptoms of Relapse-h/o 10/17/17 Emotions    Tools for Recovery-h/o  Physical body     Acceptance-group exercise  Intention    Finding gratitude  CGEOFFREY.    Building sober habits  Clarity    Sober support groups  Optimism    Have a Relapse Prevention Plan-written  Happiness

## 2021-06-15 NOTE — PROGRESS NOTES
Outpatient Mental Health Treatment Plan    Name:  Kandy Yañez  :  1975  MRN:  [id number]    Treatment Plan:  Initial Treatment Plan  Intake/initial treatment plan date:  2016  Benefit and risks and alternatives have been discussed: Yes  Is this treatment appropriate with minimal intrusion/restrictions: Yes  Estimated duration of treatment:  Ongoing therapy at this time  Anticipated frequency of services:  Bi-weekly therapy  Necessity for frequency: This frequency is needed to establish therapeutic goals and for continuity of care in order to monitor progress.  Necessity for treatment: To address cognitive, behavioral, and/or emotional barriers in order to work toward goals and to improve quality of life.    Plan:         ?   ? Anxiety    Goal:  Decrease average anxiety level from 4 to 2.   Strategies: ? [x]Learn and practice relaxation techniques and other coping strategies      ? [x] Increase involvement in meaningful activities     ? [x] Discuss sleep hygiene     ? [x] Explore thoughts and expectations about self and others     ? [x] Identify and monitor triggers for panic/anxiety symptoms     ? [x] Implement physical activity routine (with physician approval)     ? [x] Consider introduction of bibliotherapy and/or videos     ? [x] Continue compliance with medical treatment plan                                          Degree to which this is a problem (1-4): 4  Degree to which goal is met (1-4): 2    Date of next review: 2018     ? Depression    Goal:  Decrease average depression level from 4 to 1.   Strategies:    ?[x] Decrease social isolation     [x] Increase involvement in meaningful activities     ?[x] Discuss sleep hygiene     ?[x] Explore thoughts and expectations about self and others     ?[x] Assess for suicide risk     ?[x] Implement physical activity routine (with physician approval)     [x] Consider introduction of bibliotherapy and/or videos     [x] Continue compliance with  medical treatment plan      ?  Degree to which this is a problem (1-4): 4  Degree to which goal is met (1-4): 2    Date of next review: 03/27/2018    Substance use  Goal:  Maintain Sobriety .   Strategies: ? [] Consider referral for chemical dependency evaluation     ? [] Discuss barriers to participating in AA or other peer-facilitated groups         [x] Address environmental factors which may interfere with sobriety     ? [x] Explore short-term versus long-term consequences of use     ? [x] Continue compliance with medical treatment plan     Degree to which this is a problem (1-4): 2  Degree to which goal is met (1-4): 4     ?  Date of next review: 03/27/2018    PTSD   Goal: Identify triggers, separate the traumatic memory from the debilitating emotion associated with it.   Strategies:   [X]Learn and practice relaxation techniques and other coping strategies (e.g., thought stopping, reframing, meditation)   ? [X] Cognitive restructuring   ? [X] Discuss sleep hygiene   ? [X] Identify and change maladaptive coping behaviors   ? [X] Prolonged exposure therapy   ? [X] Identify cues and symptoms of PTSD     Degree to which this is a problem (1-4): 4  Degree to which goal is met (1-4): 2    Date of next review: 03/27/2018      Functional Impairment: 1=Not at all/Rarely  2=Some days  3=Most Days  4=Every Day   Personal: 4  Family: 3  Social: 4  Work: 2    Diagnosis:  bipolar disorder, mixed, severe without psychosis   generalized anxiety disorder   PTSD   cocaine use disorder    Clinical assessments completed: JAYLIN-7, PHQ-9, Mood Questionnaire current, CAGE-AID, PANSI.    Strengths: Patient denied any strengths at this time.    Limitations: Patient indicated her barriers to growth includes herself, drugs, men and money.     Cultural Identification: (Patient indicated the following:)   Race: White or    Experience of cultural bias: Patient denied ever experiencing any cultural bias or discrimination.   Impact of  culture: Patient reported her culture affected her by not having a relationship to look up to in life.   Immigration/history of status: Born [place] raised in [place].   Level of acculturation: Acculturated   Time orientation: Middle and present focused   Verbal Communication Style/languages: Does best in English.   Locus of Control: Inward and outward      Persons responsible for this plan:  ? [x] Patient ? [x] Provider ? [x] Other: Dr. Betancourt      Provider:Performed and documented by [facility] 12/27/2017    Date:  12/27/2017  Time:  12:10 PM        Patient Signature:____________________________________ Date: ______________     Guardian Signature: __________________________________ Date: ______________     Therapist Signature: __________________________________ Date: ______________
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
## 2021-06-16 NOTE — PROGRESS NOTES
Pt is here for routine psychiatric med management follow up. Client is doing well, stopped Prazosin about 1 month ago, sleeps well with PRN Trazodone, uses Vistaril PRN only. She reports being sober for almost 1.5 year. Client is taking care of 90 y.o grandmother.     Correct pharmacy verified with patient and confirmed in snapshot? [x] yes []no    Charge captured ? [x] yes  [] no    Medications Phoned  to Pharmacy [] yes [x]no  Name of Pharmacist:  List Medications, including dose, quantity and instructions      Medication Prescriptions given to patient   [] yes  [x] no   List the name of the drug the prescription was written for.       Medications ordered this visit were e-scribed.  Verified by order class [x] yes  [] no  Depakote and Effexor  Medication changes or discontinuations were communicated to patient's pharmacy: [x] yes  [] no  Called [facility] and d/c'd Prazosin  UA collected [] yes  [x] no    [place] Prescription Monitoring Program Reviewed? [] yes  [x] no    Referrals were made to:  none     Future appointment was made: [] yes  [] no  6/20/18  Dictation completed at time of chart check: [x] yes  [] no    I have checked the documentation for today s encounters and the above information has been reviewed and completed.
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
## 2021-06-18 NOTE — PROGRESS NOTES
Pt here for follow up and medication management.    Pt did have her Depakote level drawn this morning, still in process at time of rooming.     Pt states things are going well.  Depression and Anxiety are stable she states.    Denies SI/HI thoughts at this time.     [place]  below:   None Found       Correct pharmacy verified with patient and confirmed in snapshot? [x] yes []no    Charge captured ? [x] yes  [] no    Medications Phoned  to Pharmacy [] yes [x]no  Name of Pharmacist:  List Medications, including dose, quantity and instructions      Medication Prescriptions given to patient   [] yes  [x] no   List the name of the drug the prescription was written for.       Medications ordered this visit were e-scribed.  Verified by order class [x] yes  [] no  Trazodone 50 mg   Medication changes or discontinuations were communicated to patient's pharmacy: [] yes  [x] no    UA collected [] yes  [x] no    [place] Prescription Monitoring Program Reviewed? [x] yes  [] no    Referrals were made to:   None    Future appointment was made: [x] yes  [] no  09/19/18  Dictation completed at time of chart check: [] yes  [x] no    I have checked the documentation for today s encounters and the above information has been reviewed and completed.

## 2021-06-19 NOTE — LETTER
Letter by Jerri Chanel LP[facility] at      Author: Jerri Chanel [facility]CC Service: -- Author Type: --    Filed:  Encounter Date: 9/25/2019 Status: Signed         Kandy Yañez  [address] [facility] [address]           September 25, 2019      Dear Ms. Yañez,    We have documented many no shows or late cancels for scheduled appointments with Jerri Chanel MA, LPCC which unfortunately has prompted us to write this letter.     We have recorded the dates of missed or late cancelled appointments and they are as follows: May 9th, 2019, September 12th, 2019 and September 25th, 2019.     In order to provide the best care to you as our patient, your therapist must see you on a regular basis.  Due to frequent no shows and late cancels for scheduled appointments we feel that our clinic is not providing you the best care.  Per the Informed Consent agreement signed by you at intake it is our recommendation that you seek services from a psychotherapy clinic that fits well with your schedule.     Any future appointments with Jerri Chanel MA, LPCC have been cancelled.  Below are some referrals to other clinics within the community where you may choose to seek services. Should you need more help in finding resources our clinic would be more than happy to provide a printed copy of other mental health resources in [facility].      If you have scheduled appointments with another provider in this clinic please be sure to keep those appointment.                                                                           Psychotherapists  1.  [facility] - [address], [address]   [place], [place]                 [phone]                                              2.    [facility] - [address] [place] [address],                [phone]                                                                   2. [place] [facility] -   [place], [place]                      [phone]  [place], [place]             [phone]    Sincerely,       [facility]  [address]  [place]  [address]   [phone]

## 2021-06-19 NOTE — LETTER
"Letter by Jerri Chanel LP[facility] at      Author: Jerri Chanel [facility]CC Service: -- Author Type: --    Filed:  Encounter Date: 9/12/2019 Status: (Other)         September 12, 2019        Kandy Yañez  [address] [facility] [address]         Dear Kandy,       We missed you on September 12, 2019.   Your file indicates this was your second \"No Show\" appointment.  Per our policy and per the patient consent for service agreement reviewed and signed at your first visit I will only be able to accept 1 additional \"No Show\" this calendar year.      I understand life at times will get in the way of keeping appointments however it is important to call this clinic to cancel if you find you are not able to come in.  Cancelling 24 hours prior lets me know your situation and may allow me to schedule another patient who also needs my care.      Please know I take your care very seriously.  It is important to me that you reach your therapy goals. Unfortunately, this is made more difficult if your do not come to see me as agreed on.      If you don't already have an appointment scheduled and would like to return, please call this clinic to set up a new time.  You can reach the clinic at [phone].        Thank you,      Sincerely,        [facility]  [address]  [place]  [address] [phone]         "
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
## 2021-06-20 NOTE — LETTER
Letter by Sandra Estrada RN at      Author: Sandra Estrada RN Service: -- Author Type: --    Filed:  Encounter Date: 5/9/2020 Status: (Other)       5/9/2020        Kandy Yañez  [address] [facility] [address]    This letter provides a written record that you were tested for COVID-19 on 5/7/20.     Your result was negative.    This means that we didnt find the virus that causes COVID-19 in your sample. A test may show negative when you do actually have the virus. This can happen when the virus is in the early stages of infection, before you feel illness symptoms.    Even if you dont have symptoms, they may still appear. For safety, its very important to follow these rules.    Keep yourself away from others (self-isolation):      Stay home. Dont go to work, school or anywhere else.     Stay away from others in your home. No hugging, kissing or shaking hands.    Dont let anyone visit.    Cover your mouth and nose with a mask, tissue or wash cloth to avoid spreading germs.    Stay in self-isolation until you meet ALL of the guidelines below:    1. You have had no fever for at least 72 hours (that is 3 full days of no fever without the use of medicine that reduces fevers), AND  2. other symptoms (such as cough, shortness of breath) have gotten better, AND  3. at least 7 days have passed since your symptoms first appeared.    Going back to work  Check with your employer for any guidelines to follow for going back to work.    Employers: This document serves as formal notice that your employee tested negative for COVID-19, as of the testing date shown above.    For questions regarding this letter or your Negative COVID-19 result, call [phone] between 8A to 6:30P (M-F) and 10A to 6:30P (weekends).

## 2021-06-20 NOTE — PROGRESS NOTES
Pt is here for routine follow up. Client is doing well, has no new concerns today.   Correct pharmacy verified with patient and confirmed in snapshot? [x] yes []no    Charge captured ? [x] yes  [] no    Medications Phoned  to Pharmacy [] yes [x]no  Name of Pharmacist:  List Medications, including dose, quantity and instructions      Medication Prescriptions given to patient   [] yes  [x] no   List the name of the drug the prescription was written for.       Medications ordered this visit were e-scribed.  Verified by order class [x] yes  [] no   Depakote, Vistaril, Trazodone, Effexor  Medication changes or discontinuations were communicated to patient's pharmacy: [] yes  [x] no    UA collected [] yes  [x] no    [place] Prescription Monitoring Program Reviewed? [] yes  [x] no    Referrals were made to:  none    Future appointment was made: [x] yes  [] no  12/19/18  Dictation completed at time of chart check: [] yes  [x] no    I have checked the documentation for today s encounters and the above information has been reviewed and completed.

## 2021-06-20 NOTE — LETTER
Letter by Shaan Lee RN at      Author: Shaan Lee RN Service: -- Author Type: --    Filed:  Encounter Date: 5/14/2020 Status: (Other)       5/14/2020        Kandy Yañez  [address] [facility] [address]    This letter provides a written record that you were tested for COVID-19 on 5/7/20.     Your result was negative.    This means that we didnt find the virus that causes COVID-19 in your sample. A test may show negative when you do actually have the virus. This can happen when the virus is in the early stages of infection, before you feel illness symptoms.    Even if you dont have symptoms, they may still appear. For safety, its very important to follow these rules.    Keep yourself away from others (self-isolation):      Stay home. Dont go to work, school or anywhere else.     Stay in your own room (and use your own bathroom), if you can.    Stay away from others in your home. No hugging, kissing or shaking hands. No visitors.    Clean high touch surfaces often (doorknobs, counters, handles, etc.). Use a household cleaning spray or wipes.    Cover your mouth and nose with a mask, tissue or washcloth to avoid spreading germs.    Wash your hands and face often with soap and water.    Stay in self-isolation until you meet ALL of the guidelines below:    1. You have had no fever for at least 72 hours (that is 3 full days of no fever without the use of medicine that reduces fevers), AND  2. other symptoms (such as cough, shortness of breath) have gotten better, AND  3. at least 10 days have passed since your symptoms first appeared.    Going back to work  Check with your employer for any guidelines to follow for going back to work.    Employers: This document serves as formal notice that your employee tested negative for COVID-19, as of the testing date shown above.    For questions regarding this letter or your Negative COVID-19 result, call [phone] between 8A to 6:30P (M-F) and 10A to  6:30P (weekends).

## 2021-06-22 NOTE — PROGRESS NOTES
Reason for visit - follow-up, no special issues to address, at 2 years sobriety!  Sleep - good  Depression - maybe some situational, Grandma into nursing home this week, and stress way down.  Anxiety -  Lower.  Panic attacks - none.  SI/HI - none  Therapist - Jerri, needs to make an appointment.  Side effects from medication - none    No medication to report on the [place] .
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
## 2021-06-25 NOTE — PATIENT INSTRUCTIONS - HE
Psychiatric medications:  Start Nicotine patch 21 mg transdermal daily for 7 days, , then 14 mg for 7 day , then 7 mg daily for 7 days then stop.  Depakote ER  1500 mg every night  Effexor  mg every morning   Trazodone 50 -100 mg every night as needed    Vistaril 50 mg 3 times  a day as needed for anxiety and sleep     Patient will not stop taking medications or adjust them without consulting with the provider.  2.Patient will call with any problems between 2 visits.  3.Patient will go to the emergency room if not feeling safe , unable to function in the community, or if suicidal, homicidal or hearing voices or having paranoia.  4.Patient will abstain from drugs and alcohol.  5.Patient will not drive if sedated on medications or under influence of any substance.   6.Patient will not mix psychiatric medications with drugs and alcohol.   7.Patient will watch his diet and exercise.  8.Patient will see non psychiatric providers for non psychiatric disorders.  9. Next appointment in 3 months.  10.Pregnancy protection /tubal ligation  11. Patient will continue to work with  .  12. Psychotherapy  with Jerri every month  13. Liver enzymes, platelet count, wbc and Depakote level for monitoring Depakote were done on 12/19 /2018and  are normal
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
## 2021-06-25 NOTE — PROGRESS NOTES
Correct pharmacy verified with patient and confirmed in snapshot? [x] yes []no    Charge captured ? [x] yes  [] no    Medications Phoned  to Pharmacy [] yes [x]no  Name of Pharmacist:  List Medications, including dose, quantity and instructions      Medication Prescriptions given to patient   [] yes  [x] no   List the name of the drug the prescription was written for.       Medications ordered this visit were e-scribed.  Verified by order class [x] yes  [] no  Depakote 500 mg  Nicoderm   Effexor    Medication changes or discontinuations were communicated to patient's pharmacy: [] yes  [x] no    UA collected [] yes  [x] no    [place] Prescription Monitoring Program Reviewed? [] yes  [x] no    Referrals were made to:none      Future appointment was made: [x] yes  [] no    Dictation completed at time of chart check: [x] yes  [] no    I have checked the documentation for today s encounters and the above information has been reviewed and completed.

## 2021-07-03 NOTE — ADDENDUM NOTE
Addendum Note by Juliann Parada LPN at 3/21/2018  2:30 PM     Author: Juliann Parada LPN Service: -- Author Type: Licensed Nurse    Filed: 3/21/2018  2:30 PM Encounter Date: 3/21/2018 Status: Signed    : Juliann Parada LPN (Licensed Nurse)    Addended by: JULIANN PARADA on: 3/21/2018 02:30 PM        Modules accepted: Orders

## 2021-07-04 NOTE — PROGRESS NOTES
Rule 31 by Amy Taylor LADC at 2017  9:00 AM     Author: Aym Taylor LADC Service: -- Author Type: Licensed Alcohol and Drug Counselor    Filed: 2017 10:09 AM Encounter Date: 2017 Status: Signed    : Amy Taylor LADC (Licensed Alcohol and Drug Counselor)         [facility] Assessment Summary  Date: 2017        : JORGE Gore    Name: Kandy Yañez  Address: [address] [place] [address]    Phone: [phone] (home)   Referral Source: [facility] probation   : 1975  Age: 41 y.o.  Race/Ethnicity: White or   Marital Status: single  Employment: unemployed   Level of Education: 2 years of college        Socio-economic (yearly Income) Status: unknown  Sexual Orientation: heterosexual         Last 4 digits of Social Security: [id number]    Reason for seeking services    Wanting help to get back on track.     Dimension I Acute intoxication/Withdrawal Potential:    Symptomology (past 12 months, check all that apply)  [x]Increased tolerance, [x]passing out, [x] binges, [x]AM use, [x]weekly intoxication, [] decreased tolerance, [x] blackouts, [x]secretive use, [x]preoccupation, [x]protecting supply, [x] hurried ingestion, []medicinal use, []using alone, [x]repeated family or social problems, [x] mood swings, [x]loss of control, [x]family history of addiction    Observed or reported (withdrawal symptoms, check all that apply)  []SWEATING (RAPID PULSE), [] NAUSEA/VOMITING, []SHAKY/JITTERY/TREMORS, []DIZZINESS, []UNABLE TO SLEEP, []SEIZURES, []AGITATION, [] DIARRHEA, []HEADACHE, []DIMINISHED APPETITE,[]FATIGUE/EXTREMELY TIRED, []HALLUCINATIONS, []SAD /DEPRESSED FEELING, []FEVER,[]MUSCLE ACHES, []UNABLE TO EAT, []VIVID /UNPLEASANT DREAMS, []PSYCHOSIS, []IRRITABILITY, []CONFUSED/DISRUPTED SPEECH, []SENSITIVITY TO NOISE, [] ANXIETY/WORRIED,[]HIGH BLOOD PRESSURE          Chemical use most recent 12 months outside a facility and other  significant use history (client self-report)  Primary Drug Used  Age of First Use  Most Recent Pattern of Use and Duration    Date of last use and time, if needed  Withdrawal Potential? Requiring special care  Method of use   (oral, smoked, snort, IV, etc)    Alcohol  12 Would have some when others had it; past history of daily use 1-2 bottles of wine 16 None  oral   Marijuana/Hashish  12 Sporadic use, when others had it 16 None  smoke   Cocaine/Crack  16 In past year, 3-4 day binge in a week and then recovery and start over, $300-500 per binge 16 none smoke   Meth/Amphetamines  16 Sporadic use, when others had it 16 none smoke   Heroin   Denies use      Other Opiates/Synthetics   Denies use      Inhalants   Denies use      Benzodiazepines   Denies use      Hallucinogens   Denies use      Barbiturates/Sedatives/Hypnotics   Denies use      Over-the-Counter Drugs   Denies use      Other   Denies use      Nicotine  12 1/2 pack per day 17  smoke       Dimension I Risk Ratin, no concerns  Reason Risk Rating Assigned: Patient reports date of last use of crack cocaine on 16, reports most recent pattern of use has been a 3-4 binge followed by recovery in week, usually using $300-400 per binge. Patient also reports occasional use of alcohol, marijuana and meth when others had it. Patient reports does not report or display any withdrawal concerns.     Dimension II Biomedical Conditions:    Any known health conditions: Yes[x]/No[]    Ever previously treated/diagnosed with any eating disorder?  YES []/[x] NO  Explain:    List Health Concerns/Conditions Reported: sciatic nerve pain    Are Health Concerns/Conditions being treated? YES[x]/NO []  By Whom? Dr. Stapleton, [facility]   Are you pregnant: Yes[]/No[x]  OB Care Received: N/A      CPS Call needed: N/A   Dimension II Risk Ratin, mild concern  Reason Risk Rating Assigned: Patient reports health concern of sciatic pain, reports  having a primary care physician (Dr. Stapleton, [facility]), and has been referred to a neurologist. Patient reports taking medications as prescribed.     Dimension III Emotional/Behavioral/Cognitive:    Oriented to person, place, time, situation? YES []/ NO []   Current Mental Health Services: YES [x]/ NO [], therapist [facility]   Past Hospitalization for MH or psychiatric problems: YES[] / NO[x]  How many Hospitalizations: N/A    Last Hospitalization; date and location: N/A       Past or Current Issues with Gambling (Explain): in the past.     Prior Treatment for Gambling: YES[] / NO[x]  MH Diagnoses:    PTSD, bipolar I, depression, anxiety   Psychiatrist: Dr. Betancourt     [facility]    Current Psychotropic Medications:    Current Outpatient Prescriptions:   ?  divalproex (DEPAKOTE) 250 MG 24 hr tablet, TAKE 5 TABLETS BY MOUTH DAILY for total of 1250 mg at night, Disp: 150 tablet, Rfl: 5  ?  hydrOXYzine (VISTARIL) 50 MG capsule, Take 1 capsule (50 mg total) by mouth 3 (three) times a day as needed for anxiety., Disp: 30 capsule, Rfl: 2  ?  prazosin (MINIPRESS) 2 MG capsule, Take 1 capsule (2 mg total) by mouth bedtime., Disp: 30 capsule, Rfl: 5  ?  traZODone (DESYREL) 100 MG tablet, Take 1 tablet (100 mg total) by mouth bedtime., Disp: 30 tablet, Rfl: 2  ?  venlafaxine (EFFEXOR XR) 75 MG 24 hr capsule, Take 3 capsules (225 mg total) by mouth daily., Disp: 90 capsule, Rfl: 2      Taking medications as prescribed:  YES[x] / NO[]    Medications Helpful: YES[x] / NO[]    Current Suicidal Ideation: YES[] / NO[x]  If yes, any plan?  YES[] / NO[x]  What is plan?:   N/A     Previous Suicide Attempts?  YES[] / NO[x]  Explain: N/A     Current Homicidal Ideation: YES[]/NO[x] If yes, any plan? YES[]/NO[x]  What is plan?: N/A     Previous Homicide Attempts? YES[]/NO[x]Explain: N/A     Suicidal/Homicidal Ideation in last 30 days? YES[]/NO [x] (Explain)  N/A     Family history of substance  "and/or mental health diagnosis/issues?  YES[]/NO [x] (Explain)  N/A     History of abuse (Physical, Emotional, Sexual)? YES[x]/NO [] (Explain)  \"All of the above.\"      Dimension III Risk Ratin, moderate concern  Reason Risk Rating Assigned: Patient reports history of mental health diagnosis of depression, anxiety, bipolar I disorder and PTSD. Patient reports receiving psychiatry and individual psychotherapy in [facility] and is happy with these services. Patient reports significant history of abuse. Patient reports some suicidal ideation in the past year, but no current ideation/intent/plan/means.     Dimension IV Readiness to Change:    Mandated, or coerced into assessment or treatment:  YES[x] / NO  []  Does client feel there is a problem:  YES[x] / NO[]  Verbalization of need/desire to change:  YES [x]/ NO[]    Impression of : (Check all that apply):  [x]Cooperative, [x] genuinely motivated, []ambivalent about change, []minimal awareness, [] low motivation, []minimally cooperative, []non-compliant, []overtly hostile, []unwilling to explore change  Are there any spiritual, cultural, or other special needs to be addressed for client to be successful in treatment? Yes[]/No  [x]   Explain:none reported    Hazardous activities engaged in which placed self or others in danger (i.e., operating a motor vehicle, unsafe sex, sharing needles, etc.)?   Prostitution/unsafe sex      Dimension IV Risk Ratin, no concerns  Reason Risk Rating Assigned: Patient reports external motivation from probation, but also reports a strong desire for recovery          Dimension V Relapse/Continued Use/Continued Problem Potential     Client age at First Treatment: 40  Lifetime # of CD Treatments:  1  List program, dates, and status of completion (within last five years): [facility] MICD OP, 2016--did not complete    Longest Period of Abstinence: 9 years   How did you accomplish this? Was very involved in the " recovery community      Risk Taking/Problem Behaviors Related to Use: fighting, prostitution       Dimension V Risk Ratin, moderate concern  Reason Risk Rating Assigned: Patient reports current abstinent over the past month, prior reports having very short periods of abstinent. Patient reports attempting outpatient last year, but reports discontinuing it, identifies unstable housing and using friends were triggers.       Dimension VI Recovery Environment   Family support:  YES[x] / NO[]  Peer Sober Support:  YES[x] / NO []  Current living circumstances: Living with grandmother and significant other  Specific activities participating in which do not involve substance use:  Read, play games on phone, knit/geraldo, movies   Specific activities participating in which do involve substance use:  Prostitution, theft, harming self/other     Environment supportive of recovery:  YES[x] / NO[]  People, things that threaten recovery: YES[]/NO  [x]  Explain: N/A    Expected family involvement during treatment services: none  Current Legal Involvement:  Probation, [facility]  Legal Consequences related to use: prostitution and theft charges  Occupational/Academic consequences related to use: lost job in the past  Current support network for recovery (including community-based recovery support): family, [facility] staff, [facility] court.   Do you belong to a [place]: YES[]/NO[x] Which [place]? N/A   Reside on reservation: YES[]/NO[x]    Dimension VI Risk Rating: 3, serious concern  Reason Risk Rating Assigned: Patient reports that she is currently unemployed and lives with her significant other and grandmother, reports helping grandmother around the house. Patient  reports her significant other is supportive, but is also recently sober. Patient reports she is currently on probation through the [facility] court.       DSM-V Criteria for Substance Abuse  Instructions:  Determine whether the client currently  meets the criteria for a Substance Use Disorder using the diagnostic criteria in the  DSM-V, pp. 481-582. Current means during the most recent 12 months outside a facility that controls access to substances.    Category of substance Severity ICD-10 Code/DSM V Code  Alcohol Use Disorder Mild  Moderate  Severe (F10.10) (305.00)  (F10.20) (303.90)  (F10.20) (303.90)   Cannabis Use Disorder Mild  Moderate  Severe (F12.10) (305.20)  (F12.20) (304.30)  (F12.20) (304.30)   Hallucinogen Use Disorder Mild  Moderate  Severe (F16.10) (305.30)  (F16.20) (304.50)  (F16.20) (304.50)   Inhalant Use Disorder Mild  Moderate  Severe (F18.10) (305.90)  (F18.20) (304.60)  (F18.20) (304.60)   Opioid Use Disorder Mild  Moderate  Severe (F11.10) (305.50)  (F11.20) (304.00)  (F11.20) (304.00)   Sedative, Hypnotic, or Anxiolytic Use Disorder Mild  Moderate  Severe (F13.10) (305.40)  (F13.20) (304.10)  (F13.20) (304.10)   Stimulant Related Disorders Mild            Moderate                Severe   (F15.10) (305.70) Amphetamine type substance  (F14.10) (305.60) Cocaine  (F15.10) (305.70) Other or unspecified stimulant    (F15.20) (304.40) Amphetamine type substance  (F14.20) (304.20) Cocaine  (F15.20) (304.40) Other or unspecified stimulant    (F15.20) (304.40) Amphetamine type substance  (F14.20) (304.20) Cocaine  (F15.20) (304.40) Other or unspecified stimulant   DisorderTobacco use Disorder Mild  Moderate  Severe   (Z72.0) (305.1)  (F17.200) (305.1)  (F17.200) (305.1)   Other (or unknown) Substance Use Disorder Mild  Moderate  Severe (F19.10) (305.90)  (F19.20) (304.90)  (F19.20) (304.90)     Diagnostic Impression:__Cocaine Use Disorder, Severe (F14.20)_____________________________________________________________    Assessment Completed Within 3 Sessions of Admission: YES[x]/NO[]  If NO, date assessment to be completed noted in Treatment Plan: YES[]/NO[]  Signature of Counselor:__JORGE Gore________________________ Date and Time  pastora Signature: ___1/24/2017, 10:09 AM________________

## 2021-09-19 ENCOUNTER — HEALTH MAINTENANCE LETTER (OUTPATIENT)
Age: 46
End: 2021-09-19

## 2022-02-18 ENCOUNTER — TELEPHONE (OUTPATIENT)
Dept: BEHAVIORAL HEALTH | Facility: CLINIC | Age: 47
End: 2022-02-18

## 2022-02-22 NOTE — TELEPHONE ENCOUNTER
Writer placed a second call to check in patient for virtual appt at 1pm. Unable to get a hold of patient. Writer left a second vm with writer's call back number. Writer also included in vm that if patient miss the appt today, pt can reschedule with our Intake department. Writer also left Intake's number in the vm. Writer will inform pt's provider.

## 2022-02-22 NOTE — TELEPHONE ENCOUNTER
A second voice message was left for patient to return call to check in for their virtual LASHAWN eval today. Also left intakes number in case they need to reschedule or miss the appointment.

## 2022-02-22 NOTE — TELEPHONE ENCOUNTER
2/22/2022  OB left 2 voicemails to check pt in for her scheduled appt. She did not return their calls. I waited 15 minutes in the [facility] video visit. Pt did not show.

## 2022-02-22 NOTE — TELEPHONE ENCOUNTER
Patient has a virtual LASHAWN eval today, 2/22/2022 at 1300. A phone call was made out to patient to check them in for this appointment, but no answer so a voice message was left for patient to return call.

## 2022-02-23 NOTE — TELEPHONE ENCOUNTER
Rescheduled missed CD Eval. Transferred previous authorized referral since appointment is in the same month.
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
## 2022-02-24 ENCOUNTER — TELEPHONE (OUTPATIENT)
Dept: BEHAVIORAL HEALTH | Facility: CLINIC | Age: 47
End: 2022-02-24
Payer: COMMERCIAL

## 2022-02-28 ENCOUNTER — HOSPITAL ENCOUNTER (OUTPATIENT)
Dept: BEHAVIORAL HEALTH | Facility: CLINIC | Age: 47
Discharge: HOME | End: 2022-02-28
Attending: FAMILY MEDICINE | Admitting: FAMILY MEDICINE
Payer: COMMERCIAL

## 2022-02-28 VITALS — WEIGHT: 145 LBS | HEIGHT: 63 IN | BODY MASS INDEX: 25.69 KG/M2

## 2022-02-28 PROCEDURE — H0001 ALCOHOL AND/OR DRUG ASSESS: HCPCS | Mod: GT,95 | Performed by: COUNSELOR

## 2022-02-28 ASSESSMENT — COLUMBIA-SUICIDE SEVERITY RATING SCALE - C-SSRS
1. HAVE YOU WISHED YOU WERE DEAD OR WISHED YOU COULD GO TO SLEEP AND NOT WAKE UP?: YES
6. HAVE YOU EVER DONE ANYTHING, STARTED TO DO ANYTHING, OR PREPARED TO DO ANYTHING TO END YOUR LIFE?: YES
1. IN THE PAST MONTH, HAVE YOU WISHED YOU WERE DEAD OR WISHED YOU COULD GO TO SLEEP AND NOT WAKE UP?: NO
ATTEMPT PAST THREE MONTHS: NO
1. IN YOUR LIFETIME, HAVE YOU WISHED YOU WERE DEAD OR WISHED YOU COULD GO TO SLEEP AND NOT WAKE UP?: 2014
6. HAVE YOU EVER DONE ANYTHING, STARTED TO DO ANYTHING, OR PREPARED TO DO ANYTHING TO END YOUR LIFE?: NO
ATTEMPT LIFETIME: YES

## 2022-02-28 ASSESSMENT — ANXIETY QUESTIONNAIRES
GAD7 TOTAL SCORE: 12
5. BEING SO RESTLESS THAT IT IS HARD TO SIT STILL: SEVERAL DAYS
4. TROUBLE RELAXING: MORE THAN HALF THE DAYS
1. FEELING NERVOUS, ANXIOUS, OR ON EDGE: MORE THAN HALF THE DAYS
3. WORRYING TOO MUCH ABOUT DIFFERENT THINGS: MORE THAN HALF THE DAYS
7. FEELING AFRAID AS IF SOMETHING AWFUL MIGHT HAPPEN: SEVERAL DAYS
2. NOT BEING ABLE TO STOP OR CONTROL WORRYING: MORE THAN HALF THE DAYS
6. BECOMING EASILY ANNOYED OR IRRITABLE: MORE THAN HALF THE DAYS

## 2022-02-28 ASSESSMENT — PATIENT HEALTH QUESTIONNAIRE - PHQ9: SUM OF ALL RESPONSES TO PHQ QUESTIONS 1-9: 15

## 2022-02-28 ASSESSMENT — PAIN SCALES - GENERAL: PAINLEVEL: MILD PAIN (2)
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
## 2022-02-28 NOTE — TELEPHONE ENCOUNTER
2/28/2022  Pt completed her LASHAWN CA and is being referred to DDP. Pt was informed she needs to call Intake to schedule an UPDATE with a MH .     [facility] Assessment ID: [id number]

## 2022-02-28 NOTE — TELEPHONE ENCOUNTER
Patient has a virtual LASHAWN eval today, 2/28/2022 at 1030. Spoke with patient to check them in for this appointment, but patient is still driving so writer will give them a call back.

## 2022-03-01 ASSESSMENT — ANXIETY QUESTIONNAIRES: GAD7 TOTAL SCORE: 12

## 2022-03-10 ENCOUNTER — TELEPHONE (OUTPATIENT)
Dept: BEHAVIORAL HEALTH | Facility: CLINIC | Age: 47
End: 2022-03-10
Payer: COMMERCIAL

## 2022-03-10 NOTE — TELEPHONE ENCOUNTER
Left VM reminder for pt about their video appt that will be connect via My Chart  on Monday 3/14/22 at 8 am.

## 2022-03-21 ENCOUNTER — HOSPITAL ENCOUNTER (OUTPATIENT)
Dept: BEHAVIORAL HEALTH | Facility: CLINIC | Age: 47
Discharge: HOME | End: 2022-03-21
Attending: FAMILY MEDICINE | Admitting: FAMILY MEDICINE
Payer: COMMERCIAL

## 2022-03-21 PROCEDURE — 90791 PSYCH DIAGNOSTIC EVALUATION: CPT | Mod: TEL,95 | Performed by: COUNSELOR

## 2022-03-21 RX ORDER — LITHIUM CARBONATE 300 MG/1
CAPSULE ORAL
COMMUNITY
Start: 2022-03-16 | End: 2022-04-13 | Stop reason: SINTOL

## 2022-03-21 RX ORDER — HYDROXYZINE HYDROCHLORIDE 25 MG/1
TABLET, FILM COATED ORAL
COMMUNITY
Start: 2022-03-16 | End: 2022-04-07

## 2022-03-21 ASSESSMENT — ANXIETY QUESTIONNAIRES
5. BEING SO RESTLESS THAT IT IS HARD TO SIT STILL: SEVERAL DAYS
7. FEELING AFRAID AS IF SOMETHING AWFUL MIGHT HAPPEN: SEVERAL DAYS
1. FEELING NERVOUS, ANXIOUS, OR ON EDGE: SEVERAL DAYS
IF YOU CHECKED OFF ANY PROBLEMS ON THIS QUESTIONNAIRE, HOW DIFFICULT HAVE THESE PROBLEMS MADE IT FOR YOU TO DO YOUR WORK, TAKE CARE OF THINGS AT HOME, OR GET ALONG WITH OTHER PEOPLE: SOMEWHAT DIFFICULT
3. WORRYING TOO MUCH ABOUT DIFFERENT THINGS: MORE THAN HALF THE DAYS
2. NOT BEING ABLE TO STOP OR CONTROL WORRYING: SEVERAL DAYS
6. BECOMING EASILY ANNOYED OR IRRITABLE: MORE THAN HALF THE DAYS
GAD7 TOTAL SCORE: 10

## 2022-03-21 ASSESSMENT — PATIENT HEALTH QUESTIONNAIRE - PHQ9
SUM OF ALL RESPONSES TO PHQ QUESTIONS 1-9: 11
5. POOR APPETITE OR OVEREATING: MORE THAN HALF THE DAYS

## 2022-03-21 ASSESSMENT — COLUMBIA-SUICIDE SEVERITY RATING SCALE - C-SSRS
6. HAVE YOU EVER DONE ANYTHING, STARTED TO DO ANYTHING, OR PREPARED TO DO ANYTHING TO END YOUR LIFE?: NO
ATTEMPT SINCE LAST CONTACT: NO
1. SINCE LAST CONTACT, HAVE YOU WISHED YOU WERE DEAD OR WISHED YOU COULD GO TO SLEEP AND NOT WAKE UP?: NO
TOTAL  NUMBER OF ABORTED OR SELF INTERRUPTED ATTEMPTS SINCE LAST CONTACT: NO
TOTAL  NUMBER OF INTERRUPTED ATTEMPTS SINCE LAST CONTACT: NO
SUICIDE, SINCE LAST CONTACT: NO
2. HAVE YOU ACTUALLY HAD ANY THOUGHTS OF KILLING YOURSELF?: NO

## 2022-03-21 NOTE — PATIENT INSTRUCTIONS
[place]  Welcome to [facility] Adult Mental Health Outpatient Programs    Thank you for choosing [facility]!    Congratulations! You have completed the first step in your recovery by participating in a Diagnostic Assessment. With your input, Evelia Bonilla, [facility], [facility], is recommending the following level of care and services to best meet your needs.    Managing mental health symptoms while balancing life stressors can be difficult. Our mental health programming will provide the group therapy, education, skills, and support needed to improve your well-being while living a healthy and manageable lifestyle.    All our Adult Mental Health Outpatient Programs are group-based and allow you to meet with peers who are trying to manage similar symptoms and/or life circumstances in a safe and therapeutic setting.    Recommendations and Plan:    Level of Care:  Dual Disorder Program (DDP-ADT) [phone]    Start Date: TBD    Schedule:  Monday through Friday @ 9 AM to 11:50 AM    If you were placed on a Wait List following your Diagnostic Assessment, please expect the following:    You will receive a phone call from the program within a few days to discuss a start date and plan.      Please contact the program at the number listed above if you are choosing to be removed from the Wait List, need to reschedule your start or if you have any additional questions.    Type of Participation:  Virtual     We are currently providing 100% online group-based programming. It is a requirement that you be physically in the State of [place] when accessing services. Our providers must be licensed in the state you are located in.      To provide the best group experience for everyone, we expect confidentiality, regular attendance, and respectful participation by all.      1. Cameras need to be on during groups. We want to see you!   2. Be sure to be in a private place where others will not overhear or walk in. Using headphones and making  sure your screen is not visible to others are steps you can take to ensure confidentiality.   3. What is said in group, stays in group. (All personal or identifying information shared in group should be kept confidential and not shared with anyone).    4. Zoom may automatically show your first and last name unless you change it when logging into group. We encourage you to change your name to your first or preferred name. You may also include preferred pronouns.   5. Please be sitting upright in a comfortable position. This will maximize engagement and participation for everyone.   6. Please refrain from smoking, eating, driving, or engaging in other distracting activities during groups.  7. Facilitators may provide reminders of the above expectations during group as needed.    8. If your camera is off and you are not responding to prompts, facilitators will assume you have left and place you in the waiting room. You will need to request to return to group.    9. Please do NOT cancel your appointments through [employer].    If you are going to be absent, please contact the program number and leave a message so staff will not expect you.     You will NOT be billed for any sessions you do not attend.      See additional attendance and program participation information below.     Accessing Virtual Groups:    1. The best way to attend groups is through your [employer] account. Please ask staff if you need assistance setting up an account. [employer] HELPLINE: [phone] or [employer] - Login Page [email or web address]).  2. You will also need to download Zoom Download for Windows - Zoom to your computer (preferred), phone or iPad/Tablet devise. It is NOT necessary to log into or set up a Zoom account.  3. Log into My Chart each day before group.   4. Go to the  Visits  tab and select the current date.    5. You will be asked to verify personal information on the first day or for longer programs, every 30 days. Please allow extra time on  your first day to complete this. You will also be asked to complete assessments regularly so we can monitor your progress and address concerns.    6. The daily invite through [employer] expires 15 minutes after group starts.     You will need to call the program number to request a link to the group if you:   - log out of group once it begins   - are late to group   - get disconnected   - are unable to access group for any reason      There is a new link created every day.    7. Breaks are provided between groups (10 minutes)     Do not log out.    You may mute or pause your video.     The group facilitator may put you in virtual waiting room until the next group starts.      Dual Diagnosis Adult Day Treatment Program Overview (DDP- ADT) [phone]    This level of care is less intensive than an inpatient or partial hospitalization program and provides more support than traditional individual psychotherapy. Mental health and substance use concerns will be addressed together for a successful treatment experience.     Length of Stay Typically, patients attend 6-8 weeks of programming, although this can vary depending on specific patient needs.   Treatment team Multi-disciplinary team includes a licensed psychotherapist, registered nurse, and occupational therapist.     Group-based programming - 3 groups per day; 5 days per week  - 50 minutes per group  - 10-minute break between each group  - Facilitated by a member of the treatment team    Group Psychotherapy is provided daily, allowing time to check-in, process concerns and share feedback/support. All other groups include a topic and provide opportunities for education, skill building, discussion, and support.      Example Group Topics Admission IOP topics are listed above and will align with additional group topics covered throughout the 12-week combined programming.     Topics may include:      Behavior Activation, Cognitive Restructuring: Cognitive Distortions,  Communication Skills/ Areas for Self-Improvement. Coping Skills, Discharge Planning, Dimensions of Wellness, Emotions, Forgiveness, Functional Nutrition, Grief, Life Transitions, Leisure Exploration, Life Skills, Medication Assessment, Mental Health Social Support, Mindfulness, Mood Tracking/Triggers, Motivation and Procrastination, Relationship, Relaxation Techniques, Self-Awareness, Self-Confidence, Self-Care, Self-Compassion, Shame and Guilt, Sleep hygiene, SMART Goals, Stages to [place] with Stress, Stigma, Strategies to Improve Motivation, Symptom Awareness, Time Management, Trauma Triggers, Values, Wellness     Psychiatrist - Available for Treatment Team consultation. Patients are encouraged to continue with their outpatient/community providers.     Our Nursing team will partner with you to triage medication questions/concerns.    - Patients who need bridging services between providers may request a visit as needed and we will coordinate when available.      Individual Therapy Not provided in this program.   Physical Health Screen Patients meet with a program nurse within the first week to complete a brief physical health screen. This is a program requirement.   FMLA or Short-term Disability - We encourage you to request completion of paperwork from your long-term providers.   - If this is not an option, please notify the program nurse as soon as possible.  - We will do our best to help you coordinate completion of paperwork.   - Medical Record requests: please contact Release of Information  at [phone] and allow up to 14 days for records once the authorization for release is received.    Treatment and Discharge Planning Patients meet individually with team members for treatment planning.   - We will help you develop goals and identify strengths and/or barriers.   - We will discuss your program participation, progress, and discharge planning.   - We are available to assist with referrals and service  coordination; please let us know how best we can support your specific needs.   - You will receive copies of your treatment plan and discharge summary via [employer].        An Important Note from your Program Treatment Team...    Welcome! We are happy to be partnering with you on your recovery journey. Our experienced clinicians have developed programming based on current research and evidence-based practice to provide you with high quality mental health treatment.     Attendance and Program Participation:    You will participate in a variety of groups each day. Our goal is to provide you with a rich and varied therapeutic healing environment knowing patients have unique experiences and preferred ways of sharing, learning, processing, and engaging in the recovery process.    You are expected to attend all groups on time.    If you are going to be late or absent, please let a team member know the reason. You can also leave that same information at the number listed above.  o In the event of an unreported absence, we will reach out to you. If we are unable to reach you, know that we may call your emergency contact.  o We always attempt to establish contact with your emergency contact prior to initiating a wellness check.    While it is important that you continue to attend appointments with your individual therapist, psychiatrist, and other medical providers, you are encouraged to schedule these appointments outside of group hours whenever possible.    If your attendance becomes a concern, your treatment team will have a discussion with you and may start an Attendance Agreement. Following your Attendance Agreement is required to prevent early discharge from the program.    To get the most out of the program and to support your wellbeing we require that you do not use any chemicals, tobacco or vape products on screen or during program hours. The team is available to assist you if this is something you struggle  with.    Please be mindful that you are part of a group; therefore, we ask that you be respectful of other group members' needs and do not use derogatory, offensive, or discriminatory language.  o You will be removed from group if suspected of being under the influence of substances or if using language that negatively impacts the group.  o Your treatment team will address any concerns with you and offer recommendations. A Behavior Agreement may be started. Following your Behavior Agreement is required to prevent early discharge from the program.    Communication with other group members outside of group is discouraged. This can affect your treatment and the way the group functions.  o If you choose to share contact information with group peers AFTER you are discharged from the program, this decision is completely independent of any program coordination or support.  o While in the program, participants may not engage in any sexual or intimate relationships with each other outside of group. This may result in immediate discharge of both participants from the program.   Trauma:    Many of our patients have experienced trauma. You are not alone. This can be challenging for patients to manage. All our team members have been trained in Trauma Informed Care and will provide you with the education, skills training, and support that you need to stabilize your symptoms.  o Specific details and descriptions of abuse, assault, violence, neglect, etc., are best processed in individual therapy as to avoid triggering other group members.  o Discussing how traumatic experiences have impacted beliefs about self/others/world and practicing skills to cope with symptoms is very appropriate for group therapy.     We look forward to working together to support your mental health.     We love feedback from our patients about our program!  Please take a few moments to respond to surveys sent out by [employer].  This will help us  continue to make improvements and to keep the things that are   important to you!  [place],  Evelia Bonilla, [facility], [facility]  Licensed Psychotherapist  [facility]  M[facility] Addiction [facility] [email or web address]  [email or web address]  Office: [phone]  Fax: [phone]  Gender pronouns: she/her/hers  Employed by:  [employer]

## 2022-03-21 NOTE — PROGRESS NOTES
"Adult Diagnostic Assessment Update    [facility]  Provider Name:  Evelia Bonilla     Credentials:  JORGE CARRASQUILLO    PATIENT'S NAME: Kandy Yañez  PREFERRED NAME: Kandy  PRONOUNS: she/her/hers  MRN:   [id number]  :   1975   ACCT. NUMBER: [id number]  DATE OF SERVICE: 3/21/22  START TIME: 8:20am  END TIME: 9:00am  PREFERRED PHONE: [phone]  May we leave a program related message: Yes  SERVICE MODALITY:  Phone Visit:      Provider verified identity through the following two step process.  Patient provided:  Patient     The patient has been notified of the following:      \"We have found that certain health care needs can be provided without the need for a face to face visit.  This service lets us provide the care you need with a phone conversation.       I will have full access to your [facility] medical record during this entire phone call.   I will be taking notes for your medical record.      Since this is like an office visit, we will bill your insurance company for this service.       There are potential benefits and risks of telephone visits (e.g. limits to patient confidentiality) that differ from in-person visits.?Confidentiality still applies for telephone services, and nobody will record the visit.  It is important to be in a quiet, private space that is free of distractions (including cell phone or other devices) during the visit.??      If during the course of the call I believe a telephone visit is not appropriate, you will not be charged for this service\"     Consent has been obtained for this service by care team member: Yes     Provider reviewed initial DA dated:  22, JORGE Castellano    Chief Complaint:   The reason for seeking services at this time is: \" I did the MICD treatment through [facility] once before and that was 4-5 hours so I do not know how different this is to that because this is only three hours on the " "computer. It really focuses on mental health along with the addiction. It explains a lot to me about the mental health. I am seeking outpatient again, if I did inpatient they would not hold my job for me. It seems like it would be more convenient for me and establish a routine if I did outpatient to go along with job and meetings. I am familiar with [facility] that is why I reached out to you guys again\".    The problem(s) began 2019 when she relasped. Patient has attempted to resolve these concerns in the past through NA meetings, treatment, therapy, psychology.    Patient does not want family members involved in their care.    Current Stressors/Losses/Concerns: the only thing that is different is starting new medication and having some side effects with one. Just started it two days again and have a follow up on Wednesday. [facility] health outreach program prescribes them. Still going to NA meetings. I go to three a week but there is one that is consistent that is every Monday 12-2pm in [place] and the other ones go through throughout the week.    Patient reports current meds as: Lithium and Hydroxyzine. Lithium is 300 mg, 1 two times per day. 25 mg Hydroxyzine 1 four times per day but only taking 1 at bedtime and hoping I can continue with that. Side effects of lithium-get shortness of breath like I am winded. My voice is raspy but it is a little extra raspy and sore throat. Mouth is really dry.     Medication Adherence:  Patient reports taking her medication, see above for details.     Patient Allergies:    Allergies   Allergen Reactions     Abilify [Aripiprazole] Other (See Comments)     Decreased libido, low BS, tiredness       Medical History:    Past Medical History:   Diagnosis Date     Bipolar disorder (H)      Chronic back pain      S/P diskectomy     L4-5       Since the initial DA, Kandy:  denies changes in her medical history.    denies changes in her living situation.    denies changes " in her employment.    denies changes regarding financial concerns or gambling behavior.   denies  changes in education status.   denies ability to meet her basic needs.   Since the initial DA, the Kandy denies changes with her relationships/support system.       Significant Losses / Trauma / Abuse / Neglect Issues:   Since the initial DA, Kandy denies new losses/trauma/abuse/neglect issues.     Substance Use History:   Patient does not report use of substances since the initial DA but patient reported to original  last use of alcohol was on 1/3/22 not the day before her assessment on 2/27/22.       Substance Age of first use Pattern and duration of use (include amounts and frequency) Date of last use       Withdrawal potential Route of administration   has used Alcohol 11/12 Per patient:Day before talking with Perla. Alcohol it was like a couple drinks, during times during the week.     HU: 7427-7396 for 1.5 month.     2021: 1-3 drinks per week.    2/27/22 no oral   has used Marijuana    13 Per patient:no use since December.    HU: [employer],  2021: if some one had it, she might take a hit.    12/2021 no smoke   has used Amphetamines    '94/'95 Per patient:No use since January 10th, 2022. Crystal meth.    Meth:  First use: teenage; experimenting with it.     1994/1995: snorting almost daily for about 6 months.     End of 2020- December 2021: smoking 3 times a week.    1/10/22 no smoke   has used Cocaine/crack     Teens              '00/'01 Cocaine:  First use: teens; experiential use; snort     Crack:  First use: 2000/2001  Used regularly from 2000/2001 until 2004.     2004: clean for 9 years  2013: relapsed  8223-1644: used  2635-5365: sober for 4 years  2020: used on a regular basis and then switched to meth.    12/2020 no smoke   has used Hallucinogens teens Used as a teen teens no oral   has used Inhalants teens Used as a teen teens no inhale   has not used Heroin             has not used  Other Opiates             has not used Benzodiazepine             has not used Barbiturates             has not used Over the counter meds.             has use Caffeine kid Per patient:No energy drinks anymore    Coffee: 4 cups per week.  Pop: 12 pack a week.  Energy Drinks: 1-3 cans a week    3/21/22 no oral   has used Nicotine  11/12 Less than 1/2 PPD    3/20/22  no  smoke   has not used other substances not listed above:  Identify:                 Current Mental Status Exam:   Appearance:  unable to assess due to telephone appointment    Eye Contact:  unable to assess due to telephone appointment   Psychomotor:  unable to assess due to telephone appointment       Gait / station:  unable to assess due to telephone appointment  Attitude / Demeanor: Cooperative  Friendly  Speech      Rate / Production: Normal/ Responsive      Volume:  Normal  volume      Language:  intact  Mood:   Normal  Affect:   Appropriate    Thought Content: Clear   Thought Process: Coherent       Associations: No loosening of associations  Insight:   Good   Judgment:  Intact   Orientation:  All  Attention/concentration: Good    Rating Scales:    PHQ9:    PHQ-9 SCORE 2/28/2022 3/21/2022   PHQ-9 Total Score 15 11   ;    GAD7:    JAYLIN-7 SCORE 2/28/2022 3/21/2022   Total Score 12 10     CGI:     First:Considering your total clinical experience with this particular patient population, how severe are the patient's symptoms at this time?: 5 (3/21/2022  9:02 AM)  ;    Most recentCompared to the patient's condition at the START of treatment, this patient's condition is: 4 (3/21/2022  9:02 AM)        Safety Assessment:  Current Safety Concerns:  [place] Suicide Severity Rating Scale (Short Version)  [place] Suicide Severity Rating (Short Version) 3/21/2022   1. Wish to be Dead (Since Last Contact) 0   2. Non-Specific Active Suicidal Thoughts (Since Last Contact) 0   Actual Attempt (Since Last Contact) 0   Has subject engaged in non-suicidal  self-injurious behavior? (Since Last Contact) 0   Interrupted Attempts (Since Last Contact) 0   Aborted or Self-Interrupted Attempt (Since Last Contact) 0   Preparatory Acts or Behavior (Since Last Contact) 0   Suicide (Since Last Contact) 0   Calculated C-SSRS Risk Score (Since Last Contact) No Risk Indicated   Patient denied SI.     Patient reports the following protective factors:spirituality, forward thinking, family and friend support. She has MH coping skills.    See Preliminary Treatment Plan for Safety and Risk Management Plan      Review of Symptoms per patient report:  Depression: Change in sleep, Lack of interest, Excessive or inappropriate guilt, Change in energy level, Difficulties concentrating, Low self-worth and Feeling sad, down, or depressed, patient reports, sleep-anywhere from 3 hours to 10 hours. More 3-5 hours at this point. No periods of no sleep recently.   Nathalia:  Elevated mood, Racing thoughts, Hypersexual, Distractibility and Impulsiveness, patient reports,comes and goes, has to have been in the last couple weeks. Feeling on top of the world feelings. Do not know if I was motivated for something or nathalia. The last month-impulsively with sexual behaviors. Just trying to do things at home since not having full time job, cannot keep one consistent thing going. Multiple things going at the same time.   Psychosis: No Symptoms  Anxiety: Excessive worry, Nervousness, Physical complaints, such as headaches, stomachaches, muscle tension, Ruminations, Poor concentration and Irritability, patient reports, not sure if side effect of lithium I am taking. Neck and shoulders are super sore. Migraine and real bad headaches.   Panic:  No symptoms  Post Traumatic Stress Disorder:  Hypervigilance, Increased arousal and Nightmares, patient reports, when I was out on the streets had a lot of traumatic things happened. Not having night terrors but have dreams that are nightmares. Easily scared and noises scare  me easily. Used to be put on drug that helps with night terrors, had to take me off that as blood pressure was too low.   Eating Disorder: No Symptoms  ADD / ADHD:  No symptoms  Conduct Disorder: No symptoms  Autism Spectrum Disorder: No symptoms  Obsessive Compulsive Disorder: No Symptoms    Patient reports the following compulsive behaviors and treatment history: none identified.      Diagnostic Criteria:   Generalized Anxiety Disorder  A. Excessive anxiety and worry about a number of events or activities (such as work or school performance).   B. The person finds it difficult to control the worry.  C. Select 3 or more symptoms (required for diagnosis). Only one item is required in children.   - Restlessness or feeling keyed up or on edge.    - Being easily fatigued.    - Difficulty concentrating or mind going blank.    - Irritability.    - Muscle tension.   D. The focus of the anxiety and worry is not confined to features of an Axis I disorder.  E. The anxiety, worry, or physical symptoms cause clinically significant distress or impairment in social, occupational, or other important areas of functioning.   F. The disturbance is not due to the direct physiological effects of a substance (e.g., a drug of abuse, a medication) or a general medical condition (e.g., hyperthyroidism) and does not occur exclusively during a Mood Disorder, a Psychotic Disorder, or a Pervasive Developmental Disorder. Bipolar I Manic Episode  MANIC EPISODE - At least one lifetime manic episode is required for the dx of Bipolar I Disorder as evidenced by present symptoms or by history  A. A distinct period of abnormally and persistently elevated, expansive, or irritable mood, lasting at least 1 week (or any duration if hospitalization is necessary).   B. During the period of mood disturbance, three (or more) of the following symptoms (four if the mood is only irritable) have persisted and have been present to a significant degree:   -  decreased need for sleep (e.g., feels rested after only 3 hours of sleep)    - flight of ideas or subjectivie experience that thoughts are racing   - distractibility   - increase in goal-directed activity   - excessive involvement in pleasurable activities that have a high potential for painful consequences, such as spending money or sexual indiscretion.  C. The mood disturbance is sufficiently severe to cause marked impairment in social or occupational functioning or to necessitate hospitalization to prevent harm to self or others, or there are severe psychotic features  D. The symptoms are not attributable to the physiologicial effects of a substance or to another medical condition  E. The episode is sufficiently severe enough to cause marked impairment in social or occupational functioning or to necessitate hospitalization to prevent harm to self or others, or there are psychotic features  F. The symptoms are not due to the direct physiological effects of a substance (eg, a drug of abuse, a medication, or other treatment) or a general medical condition (eg, hyperthyroidism).  1.) Alcohol/drug is often taken in larger amounts or over a longer period than was intended.  Met for Amphetamines.  2.) There is a persistent desire or unsuccessful efforts to cut down or control alcohol/drug use.  Met for Amphetamines and Cocaine.  3.) A great deal of time is spent in activities necessary to obtain alcohol, use alcohol, or recover from its effects.  Met for Amphetamines and Cocaine.  4.) Craving, or a strong desire or urge to use alcohol/drug.  Met for Amphetamines.  5.) Recurrent alcohol/drug use resulting in a failure to fulfill major role obligations at work, school or home.  Met for Amphetamines and Cocaine.  6.) Continued alcohol use despite having persistent or recurrent social or interpersonal problems caused or exacerbated by the effects of alcohol/drug.  Met for Amphetamines and Cocaine.  7.) Important social,  occupational, or recreational activities are given up or reduced because of alcohol/drug use.  Met for Amphetamines and Cocaine.  8.) Recurrent alcohol/drug use in situations in which it is physically hazardous.  Met for Amphetamines and Cocaine.  9.) Alcohol/drug use is continued despite knowledge of having a persistent or recurrent physical or psychological problem that is likely to have been caused or exacerbated by alcohol.  Met for Amphetamines and Cocaine.  10.) Tolerance, as defined by either of the following: A need for markedly increased amounts of alcohol/drug to achieve intoxication or desired effect..  Met for Amphetamines and Cocaine.  11.) Withdrawal, as manifested by either of the following: The characteristic withdrawal syndrome for alcohol/drug (refer to Criteria A and B of the criteria set for alcohol/drug withdrawal).. Met for Amphetamines.     Functional Status:  Patient reports the following functional impairments: relationship(s), self-care and social interactions.     WHODAS:   WHODAS 2.0 Total Score 3/21/2022   Total Score 23     Programmatic care:  Current LOCUS was assessed and patient needs the following level of care based on score 19  .      Clinical Summary:  1. Reason for assessment: seeking DDP .  2. Psychosocial, Cultural and Contextual Factors: started new medication, some instability with housing, lacks immediate family/friends support, financial concerns.  3. Principal DSM5 Diagnoses  (Sustained by DSM5 Criteria Listed Above):   296.41 Bipolar I Disorder Current or Most Recent Episode Manic, Mild   300.02 (F41.1) Generalized Anxiety Disorder.  4. Other Diagnoses that is relevant to services:   Substance-Related & Addictive Disorders Stimulant Use Disorder: Specify current severity:  Severe  304.20 (F14.20)Amphetamine type substance, and Severe, Cocaine, Specify if: in remission, Tobacco Use Disorder, Specify current severity:  305.1(F17.200) Moderate, per history PTSD.   5.  Provisional Diagnosis:   6. Prognosis: Relieve Acute Symptoms and Maintain Current Status / Prevent Deterioration.  7. Likely consequences of symptoms if not treated: If untreated, patient's mental health will likely deteriorate and may require a higher level of care.  8. Patient's strengths/skills/abilities include:  employed and work history .   9. Patient's resources for support: sober support group meetings, outpatient mental health services through [facility].    Recommendations:     1. Plan for Safety and Risk Management:   Recommended that patient call 911 or go to the local ED should there be a change in any of these risk factors..          Report to child / adult protection services was NA.     2. Patient did not identify Church, ethnic or cultural issues relevant to therapy at this time.Patient encouraged to ask for help should needs arise in the course of treatment.      3. Initial Treatment will focus on:    Mood Instability   Alcohol / Substance Use - recovery skills.     4. Resources/Service Plan:    services are not indicated.   Modifications to assist communication are not indicated.   Additional disability accommodations are not indicated.      5. Collaboration:   Collaboration / coordination of treatment will be initiated with the following support professionals: A verbal Release of Information has been obtained for:emergency contacts, mother, Shwetha Johnson, [phone], friend, Liss Jain, [phone].     6.  Referrals:   The following referral(s) will be initiated (list in order of priority or patient preference): DDP. Next Scheduled Appointment: TBD due to program wait list. Pt lacks the appropriate mental health and recovery coping skills (relapse prevention skills, sober coping skills, impulse control, insight into the disease concept of addiction, and insight into co-occurring dx's) and is at a moderate risk for relapse/continued use. Pt would appear to benefit  from structure support and routine outside of her work and sober support group meetings.    7.  LASHAWN:  Discussed the general effects of drugs and alcohol on health and well-being.  Recommendations: 1)  Complete an Intensive Outpatient MICD Treatment Program, such as [facility] Dual Disorders Program.  2)  Abstain from all mood-altering chemicals unless prescribed by a licensed provider.   3)  Continue to attend, at minimum, 2 weekly NA meetings.  4)  Continue to actively work with your female sponsor on a weekly basis.   5)  Follow all the recommendations of your treatment/medical providers.  6)  Remain law abiding and follow all recommendations of the Courts/PO.  Patient reports they are willing to follow these recommendations.       8. Records:   They were reviewed at time of assessment.   Information in this assessment was obtained from the medical record and  provided by patient who is a fair historian.    Patient will have open access to their mental health medical record.            LOCUS Worksheet     Name: Kandy Yañez MRN: [id number]    : 1975      Gender:  female    PMI:  [id number]   Provider Name: FOREIGN Diaz, JORGE   Provider NPI:  [id number]    Actual level of Care Provided:  Psychiatry    Service(s) receiving or referred to:  DDP    Reason for Variance: Pt lacks the appropriate mental health and recovery coping skills (relapse prevention skills, sober coping skills, impulse control, insight into the disease concept of addiction, and insight into co-occurring dx's) and is at a moderate risk for relapse/continued use. Pt would appear to benefit from structure support and routine outside of her work and sober support group meetings.      Rating completed by: FOREIGN Diaz, JORGE      I. Risk of Harm:   3      Moderate Risk of Harm    II. Functional Status:   3      Moderate Impairment    III. Co-Morbidity:   3      Significant Co-Morbidity    IV - A. Recovery  Environment - Level of Stress:   3      Moderately Stress Environment    IV - B. Recovery Environment - Level of Support:   3      Limited Support in Environment    V. Treatment and Recovery History:   2      Significant Response to Treatment and Recovery Management    VI. Engagement and Recovery Project:   2      Positive Engagement and Recovery       19 Composite Score    Level of Care Recommendation:   17 to 19       High Intensity Community Based Services

## 2022-03-22 ENCOUNTER — TELEPHONE (OUTPATIENT)
Dept: BEHAVIORAL HEALTH | Facility: CLINIC | Age: 47
End: 2022-03-22
Payer: COMMERCIAL

## 2022-03-22 ASSESSMENT — ANXIETY QUESTIONNAIRES: GAD7 TOTAL SCORE: 10

## 2022-03-22 NOTE — TELEPHONE ENCOUNTER
Writer spoke with patient to check in regarding wait list status for DDP 1 program. Writer informed patient that she will contact her when group is able to begin. Writer provided patient with writer's contact information.     Lesli Bhakta [facility], [facility]  3/22/2022

## 2022-03-24 ENCOUNTER — BEH TREATMENT PLAN (OUTPATIENT)
Dept: BEHAVIORAL HEALTH | Facility: CLINIC | Age: 47
End: 2022-03-24
Attending: PSYCHIATRY & NEUROLOGY

## 2022-03-24 ENCOUNTER — TELEPHONE (OUTPATIENT)
Dept: BEHAVIORAL HEALTH | Facility: CLINIC | Age: 47
End: 2022-03-24
Payer: COMMERCIAL

## 2022-03-24 DIAGNOSIS — F31.9 BIPOLAR I DISORDER (H): ICD-10-CM

## 2022-03-24 NOTE — PROGRESS NOTES
Updated 2022 updated meds Norberto Burnham, TACO on 2022 at 4:02 PM      RN Review of Medical History / Physical Health Screen  Outpatient Mental Health Programs - [facility] Dual Diagnosis Day Treatment    PATIENT'S NAME: Kandy Yañez  MRN:   [id number]  :   1975  ACCT. NUMBER: [id number]  CURRENT AGE:  46 year old    DATE OF DIAGNOSTIC ASSESSMENT: 2022  DATE OF ADMISSION: 3/28/2022     Please see Diagnostic Assessment for additional Medical History.     General Health:   Have you had any exposure to any communicable disease in the past 2-3 weeks? no     Are you aware of safe sex practices? yes   Do you have a history of seizures?     If so, do you have a seizure plan? Known triggers?     Notify patient that we will call 911 (if virtual) or a code (if in-person), if we were to witness seizure during group. no          yes     Nutrition:    Are you on a special diet? If yes, please explain:  no   Do you have any concerns regarding your nutritional status? If yes, please explain:  no   Have you had any appetite changes in the last 3 months?  No     Have you had any weight loss or weight gain in the last 3 months?  No     Do you have a history of an eating disorder? no   Do you have a history of being in an eating disorder program? no     Patient height and weight recorded by RN in epic flowsheet: No - Unable to measure  Because of temporary in-person programmatic suspension due to COVID-19 pandemic, all pt weights and heights will be collected through patient self-report an recorded in physical health screening progress note upon admission to the program.                            Height/Weight Review:  Patient reported height: 5'3       Patient reports weight:  Date last checked: 140lbs  2022    Any referrals/needs identified? none             Fall Risk:   Have you had any falls in the past 3 months? no     Do you currently use any assistive devices for mobility?   no       Additional Comments/Assessment:      Per completion of the Medical History / Physical Health Screen, is there a recommendation to see / follow up with a primary care physician/clinic or dentist?    No.     run, no activity 3/28/2022    Norberto Burnham RN  3/24/2022

## 2022-03-24 NOTE — PROGRESS NOTES
"Updated 4/7/2022   Last use: 3/26/2022  Norberto Burnham RN on 4/7/2022 at 3:57 PM      Admission SBAR NOTE  Adult  Outpatient Programs          SITUATION:     Admission Date: 3/24/2022    Patient name:  Kandy Yañez  Preferred name: Kandy She/Her/Hers/Herself 46 year old  Diagnosis/-es (copy from , including ICD-10):   Principal DSM5 Diagnoses  (Sustained by DSM5 Criteria Listed Above):   296.41 Bipolar I Disorder Current or Most Recent Episode Manic, Mild   300.02 (F41.1) Generalized Anxiety Disorder.  4. Other Diagnoses that is relevant to services:   Substance-Related & Addictive Disorders Stimulant Use Disorder: Specify current severity:  Severe  304.20 (F14.20)Amphetamine type substance, and Severe, Cocaine, Specify if: in remission, Tobacco Use Disorder, Specify current severity:  305.1(F17.200) Moderate, per history PTSD.       Assigned Program/Track: DDP1    Reviewed patient's schedule and informed them of any variation due to late days (PHP) or Holidays. yes    Does the patient have any planned absences and/or barriers to admission/treatment? no  NOTE: impact of transportation, technology, childcare, work, or housing concerns.    Insurance: Payor: [facility] / Plan: [facility] / Product Type: HMO /  Changes/Concerns: no    Does patient need an appointment with the program provider? no  NOTE: If yes, please schedule provider visit.      BACKGROUND:     Patient's stated goal/reason for treatment (copy from ; confirm with patient): \"The reason for seeking services at this time is: \" I did the MICD treatment through [facility] once before and that was 4-5 hours so I do not know how different this is to that because this is only three hours on the computer. It really focuses on mental health along with the addiction. It explains a lot to me about the mental health. I am seeking outpatient again, if I did inpatient they would not hold my job for me. It seems like it would be more convenient for me and establish " "a routine if I did outpatient to go along with job and meetings. I am familiar with [facility] that is why I reached out to you guys again\".    The problem(s) began  when she relasped. Patient has attempted to resolve these concerns in the past through NA meetings, treatment, therapy, psychology.     \"      ASSESSMENT:     Please consult  if any of the following concerns may impact admission/participation in program:     PHQ-2, JAYLIN-2 and PROMIS done within 7 days OR send upon admission if over 7 days.      [place] Suicide Severity Rating (select Lifetime/Recent):   [place] Suicide Severity Rating Scale (Lifetime/Recent)  [place] Suicide Severity Rating (Lifetime/Recent) 2022   1. Wish to be Dead (Lifetime) 1   Wish to be Dead Description (Lifetime)    1. Wish to be Dead (Past 1 Month) 0   Actual Attempt (Lifetime) 1   Total Number of Actual Attempts (Lifetime) (No Data)   Actual Attempt Description (Lifetime) (No Data)   Actual Attempt (Past 3 Months) 0   Has subject engaged in non-suicidal self-injurious behavior? (Lifetime) 1   Has subject engaged in non-suicidal self-injurious behavior? (Past 3 Months) 0   Preparatory Acts or Behavior (Lifetime) 1   Preparatory Acts or Behavior (Past 3 Months) 0   Calculated C-SSRS Risk Score (Lifetime/Recent) Moderate Risk       LOCUS Composite Ratin        High Intensity Community Based Services: 17 to 19 Composite Rating (Day Tx or IOP)   Medically Monitored Non-Residential Services: 20 to 22 Composite Rating (PHP or IOP)   NOTE: if composite rating does not match recommendation, please notify  that a variance may be required.      Copy/Paste current Safety Plan to the BEH TX PLAN ENCOUNTER. no SP    Safety status/concerns: No     Substance use concerns: yes  Substance Use History:   Patient does not report use of substances since the initial DA but patient reported to original  last use of alcohol was on 1/3/22 not the " day before her assessment on 2/27/22.        Substance Age of first use Pattern and duration of use (include amounts and frequency) Date of last use       Withdrawal potential Route of administration   has used Alcohol 11/12 Per patient:Day before talking with Perla. Alcohol it was like a couple drinks, during times during the week.      HU: [id number] for 1.5 month.     2021: 1-3 drinks per week.    2/27/22 no oral   has used Marijuana    13 Per patient:no use since December.     HU: [employer],  2021: if some one had it, she might take a hit.    12/2021 no smoke   has used Amphetamines    '94/'95 Per patient:No use since January 10th, 2022. Crystal meth.     Meth:  First use: teenage; experimenting with it.     1994/1995: snorting almost daily for about 6 months.     End of 2020- December 2021: smoking 3 times a week.    1/10/22 no smoke   has used Cocaine/crack     Teens              '00/'01 Cocaine:  First use: teens; experiential use; snort     Crack:  First use: 2000/2001  Used regularly from 2000/2001 until 2004.     2004: clean for 9 years  2013: relapsed  [id number]: used  [id number]: sober for 4 years  2020: used on a regular basis and then switched to meth.    12/2020 no smoke   has used Hallucinogens teens Used as a teen teens no oral   has used Inhalants teens Used as a teen teens no inhale   has not used Heroin             has not used Other Opiates             has not used Benzodiazepine             has not used Barbiturates             has not used Over the counter meds.             has use Caffeine kid Per patient:No energy drinks anymore     Coffee: 4 cups per week.  Pop: 12 pack a week.  Energy Drinks: 1-3 cans a week    3/21/22 no oral   has used Nicotine  11/12 Less than 1/2 PPD    3/20/22  no  smoke   has not used other substances not listed above:  Identify:                   NOTE: if no substance use concerns, OK to delete below:     Patient reported last use of substances as:  "\"last week\".     Patient reports/presents withdrawal/intoxication concerns: no    Pertinent Medical/Nutritional concerns: no    Review Tele-Health Requirements (including secure environment, confidentiality, in-state status, equipment needs and process - encourage MyChart): yes    Paper or Docusign requirements for ROIs, e-HOLGER, emergency contact, etc have been completed? no educated  If not, do upon admission.     Confirm Emergency Contact listed in the SnapShot/Demographics with patient and notify OBC if an update is required. no    Does patient have FMLA or Short-Term Disability requests/plans? no  NOTE: Whenever possible, FMLA or Short-Term Disability paperwork needs to be managed/completed by the patient's community provider.   Exceptions: Patient does not have a community provider AND request is specific to mental health and time off for the duration of the program participation.    Notify RN Triage as soon as possible.     Care Providers/Medication Management Needs:     Does patient have a current community or other [facility] provider prescribing medications for mental health? no  NOTE: Delete below if not applicable:    Psychiatric Provider (or PCP if managing MH meds)/Name: Referral psychiatry  Clinic name/location:    Last appointment:     Next appointment:        NOTE: Inform patients, program is temporary and we will not be transferring care. Patient's should continue to see their community provider.       Individual Therapist/Name:  Will think about while in group  Clinic name/location:    Last appointment:     Next appointment:         Patient will continue to see above provider while participating in program: no        RECOMMENDATIONS:     Patient Admission Completed: yes    Care Team, referrals made/needed: yes psychiatry  PCP:   NOTE: Notify RN, as needed, to make internal referrals.                                                             Completed by: Norberto Burnham RN               "

## 2022-03-24 NOTE — TELEPHONE ENCOUNTER
Writer contacted patient to inform her of opening in DDP 1 program. Left message requesting return phone call to schedule start.    FOREIGN Escamilla, [facility]  3/24/2022

## 2022-03-28 ENCOUNTER — TELEPHONE (OUTPATIENT)
Dept: BEHAVIORAL HEALTH | Facility: CLINIC | Age: 47
End: 2022-03-28
Payer: COMMERCIAL

## 2022-03-28 DIAGNOSIS — F31.9 BIPOLAR DISORDER, UNSPECIFIED (H): Primary | ICD-10-CM

## 2022-03-28 NOTE — PROGRESS NOTES
Adult Dual Disorder Program:  Individualized Treatment Plan     Date of Plan: 3/28/22    Name: Kandy Yañez MRN: [id number]    : 1975    Programs:  Adult Dual Disorder Program (DDP)    Clinical Track (if applicable):  1    DSM5 Diagnosis:  Bipolar I Disorder Current or Most Recent Episode Manic, Mild   300.02 (F41.1) Generalized Anxiety Disorder.  4. Other Diagnoses that is relevant to services:   Substance-Related & Addictive Disorders Stimulant Use Disorder: Specify current severity:  Severe  304.20 (F14.20)Amphetamine type substance, and Severe, Cocaine, Specify if: in remission, Tobacco Use Disorder, Specify current severity:  305.1(F17.200) Moderate, per history PTSD.             Adult Dual Disorder Program Multidisciplinary Team Members: FOREIGN Will, LADC; LADC; LADC; Virginia Lino MA, OTR/L; Norberto Leavitt RN; Dr. Sriram Hines MD, Dr. Liz Fermin MD    Kandy Yañez will participate in the Adult Dual Disorder Program  5 days per week, 3 hours per day. Anticipated duration/discharge: 6-8 weeks     Due to COVID-19, services will be delivered via telemedicine until further notice.        Program Start Date: 22  Anticipated Discharge Date: 6/3/22 (pending authorization/clinical changes)      Review Date: Does Kandy Yañez continue to meet criteria to participate in the Adult Dual Disorder Program, 5 days per week; 3 hours per day?   22 yes   6/3/22 no                   Client Strengths:  committed to sobriety, goal-focused, good listener, has a previous history of therapy, insightful, motivated, open to learning, open to suggestions / feedback, support of family, friends and providers, wants to learn, willing to ask questions and willing to relate to others    Client Participation in Plan:  Contributed to goals and plan   Attended individual treatment plan meeting on 3/28/22  Agrees with plan   Received copy of treatment plan   Discussed with staff     Areas of  "Vulnerability:  Manic symptoms   Anxiety  Depressive symptoms   Substance use     Long-Term Goals:  Knowledge about illness and management of symptoms   Maintenance of personal safety   Maintenance of sobriety   Effective management of impulsivity     Abuse Prevention Plan:  Safe, therapeutic environment   Safety coping plan as needed   Education regarding illness and skill development   Coordination with care providers   Impluse control education and intervention   Medication adjustment/management (MI/CD)   Monitor for use of substances    Discharge Criteria:  Satisfactory progress toward treatment goals   Improvement re: identified problems and symptoms   Ability to continue recovery at next level of service   Has a discharge plan in place   Has safety/coping plan in place   Ability to maintain sobriety  Complete goodbye letter assignment   Regular attendance as scheduled     Areas of Treatment Focus     Why are you seeking treatment/What do you want to focus on during treatment? \"to stop using\"     Area of Treatment Focus:   Personal Safety  Start Date:    4/14/22    Dimension:   III. Emotional / Behavioral Condition    Description:    Kandy has not had any recent safety concerns.     Goal:  Target Date: 5/11/22; 6/3/22 Status: Completed  Client will notify staff when needing assistance to develop or implement a coping plan to manage suicidal or self injurious urges.  Client will use coping plan for safety, as needed.      Progress:   5/12/22: Kandy has noticed some depression symptoms before getting her period and is speaking with her providers about it.  CONTINUE    6/3/22: Kandy has not had any recent safety concerns.  COMPLETED    Treatment Strategies:   Engage in safety planning when indicated  Facilitate increased self awareness      Area of Treatment Focus:   Abstinence / Relapse Prevention  Start Date:    4/14/22   I. Acute Intoxication / Withdrawal Potential   Description:    Kandy has struggled to remain " abstinent from methamphetamine     Goal:  Target Date: 5/11/22; 6/3/22 Status: Completed  Kandy will remain sober while in DDP.    Kandy will avoid people/places/things that increase urges.    Kandy will learn and practice coping skills to manage urges/cravings.       Progress:   5/12/22: Kandy has not used since staring DDP.  She has done really well at avoiding people/places/things and has had minimal urges/cravings.  CONTINUE    6/3/22: Kandy had remained sober since starting DDP.  She has continued to do well at avoiding triggers and using her coping skills to manage urges/cravings.  COMPLETED      Treatment Strategies:   Facilitate increased self awareness  Provide education regarding relapse issues  Use reality based supportive approach      Area of Treatment Focus:   Symptom Stabilization and Management  Start Date:    4/14/22    Dimension:   III. Emotional / Behavioral Condition    Description:  Kandy has been struggling with symptoms of depression.      Goal:  Target Date: 5/11/22; 6/3/22 Status: Completed  Kandy will learn and practice coping skills to manage depression.       Progress:   5/12/22: Kandy had reported an increase in symptoms the week before her period.  She feels like she has been avoiding her depression rather than using skills to manage it.  CONTINUE    6/3/22: Kandy's mood has been fluctuating quite a bit over her time in DDP.  Her Depakote level had been low and she recently had it increased in hopes that it will help stabilize her mood. COMPLETED    Treatment Strategies:   Facilitate increased self awareness  Provide education regarding coping skills  Teach adaptive coping skills and communication skills  Use reality based supportive approach      Area of Treatment Focus:   Develop / Improve Independent Living / Socialization Skills  Start Date:    4/14/22    Dimension:   II. Biomedical Conditions, III. Emotional / Behavioral Condition and VI. Recovery Environment    Description:    Kandy is struggling  with sleep (sleeping too much), nutrition, and exercise.     Goal:  Target Date: 5/11/22; 6/3/22 Status: Completed  Kandy will work on improving sleep hygeine and routine.      Kandy will work on improving nutrition and exercise.        Progress:   5/12/22: Overall, Kandy's sleep has been good and she has been getting 7-8.5 hours per night.  Kandy has been doing well with improving her nutrition and exercise as well (with the exception of a few days before her period).  CONTINUE    6/3/22: Kandy has been struggling with sleep due to fluctuations in mood with her bipolar disorder.  She was exercising a lot while manic but pain and lower mood has now been a barrier.  COMPLETED    Treatment Strategies:   Engage in safety planning when indicated  Facilitate increased self awareness  Provide education regarding wellness  Teach adaptive coping skills and communication skills      Area of Treatment Focus:   Community Resources / Support and Discharge Planning  Start Date:    4/14/22    Dimension:   VI. Recovery Environment    Description:    Kandy is not currently seeing individual providers but is working towards establishing care with a therapist and psychiatrist.     Goal:  Target Date: 5/11/22; 6/3/22 Status: Completed  Kandy will establish care with a psychiatrist.    Kandy will continue to go to NA meetings once per week.      Kandy recently got a job at [employer] and is working on creating a balanced work/life routine.        Progress:   5/12/22: Kandy has not yet established care with a psychiatrist and therapist but is working on it.  Kandy has been continuing to attend NA meetings almost every day.       6/3/22: Kandy has still not yet established care with individual providers.  She continues to attend NA meetings.  She ended up losing her job at [employer] and is taking a break from finding a job.  COMPLETED    Treatment Strategies:   Assist clients in establishing / strengthening support network  Assist to identify treatment goals  Assist  with discharge planning       Liliana Olson [facility]      NOTE: Signatures are available on the Acknowledgement of Treatment Plan located in Chart Review    The Individualized Treatment Plan Signature Page has been routed to the provider for co-sign.     I have reviewed the patient's Individualized Treatment Plan and agree with the current goals, interventions and level of care.     Sriram Hines MD  4/14/2022, 5/9/2022, 5/12/2022

## 2022-03-28 NOTE — TELEPHONE ENCOUNTER
Spoke with pt, DDP1 is being paused due to low census.Pt understands the group is close for now and we will reach out when we have a new start date.    Referrals for 1:1 talk therapy, short term psychiatry and long-term psychiatry were placed.

## 2022-03-29 NOTE — TELEPHONE ENCOUNTER
----- Message from FOREIGN Escamilla sent at 3/29/2022  8:15 AM CDT -----  Please cancel DDP 1 appts

## 2022-04-07 RX ORDER — DIVALPROEX SODIUM 250 MG/1
250 TABLET, DELAYED RELEASE ORAL 3 TIMES DAILY
COMMUNITY
End: 2022-04-07

## 2022-04-07 RX ORDER — HYDROXYZINE PAMOATE 25 MG/1
50 CAPSULE ORAL
COMMUNITY
End: 2022-04-13 | Stop reason: DRUGHIGH

## 2022-04-07 RX ORDER — VENLAFAXINE 37.5 MG/1
37.5 TABLET ORAL EVERY MORNING
COMMUNITY
End: 2022-05-02

## 2022-04-08 NOTE — TELEPHONE ENCOUNTER
----- Message from Norberto Burnham RN sent at 4/8/2022  1:36 PM CDT -----  Scheduling Request    Patient Name: Kandy Yañez  Location of programming: virtual  Start Date: April / 11 / 2022  Group:  DDP1 on Monday, Tuesday, Thursday, and Friday at 09:00 AM to 12:00 PM  Attending Provider (MD): Sriram Hines  Number of visits to be scheduled: 120  Duration of Appointment in minutes: 180  Visit Type: Zoom - [id number]

## 2022-04-11 ENCOUNTER — HOSPITAL ENCOUNTER (OUTPATIENT)
Dept: BEHAVIORAL HEALTH | Facility: CLINIC | Age: 47
Discharge: HOME | End: 2022-04-11
Attending: PSYCHIATRY & NEUROLOGY
Payer: COMMERCIAL

## 2022-04-11 ENCOUNTER — TELEPHONE (OUTPATIENT)
Dept: BEHAVIORAL HEALTH | Facility: CLINIC | Age: 47
End: 2022-04-11
Payer: COMMERCIAL

## 2022-04-11 PROCEDURE — 90853 GROUP PSYCHOTHERAPY: CPT | Mod: GT,95 | Performed by: SOCIAL WORKER

## 2022-04-11 PROCEDURE — 90853 GROUP PSYCHOTHERAPY: CPT | Mod: GT,95 | Performed by: COUNSELOR

## 2022-04-11 NOTE — GROUP NOTE
Process Group Note    PATIENT'S NAME: Kandy Yañez  MRN:   [id number]  :   1975  ACCT. NUMBER: [id number]  DATE OF SERVICE: 22  START TIME:  9:00 AM  END TIME:  9:50 AM  FACILITATOR: Liliana Olson [facility]  TOPIC:  Process Group    Diagnoses:  Bipolar I Disorder Current or Most Recent Episode Manic, Mild   300.02 (F41.1) Generalized Anxiety Disorder.  4. Other Diagnoses that is relevant to services:   Substance-Related & Addictive Disorders Stimulant Use Disorder: Specify current severity:  Severe  304.20 (F14.20)Amphetamine type substance, and Severe, Cocaine, Specify if: in remission, Tobacco Use Disorder, Specify current severity:  305.1(F17.200) Moderate, per history PTSD.       [facility] Adult Dual Diagnosis Day Treatment  TRACK: 1    NUMBER OF PARTICIPANTS: 3                                      Service Modality:  Video Visit     Telemedicine Visit: The patient's condition can be safely assessed and treated via synchronous audio and visual telemedicine encounter.      Reason for Telemedicine Visit: Services only offered telehealth    Originating Site (Patient Location): Patient's home    Distant Site (Provider Location): Provider Remote Setting- Home Office    Consent:  The patient/guardian has verbally consented to: the potential risks and benefits of telemedicine (video visit) versus in person care; bill my insurance or make self-payment for services provided; and responsibility for payment of non-covered services.     Patient would like the video invitation sent by:  My Chart    Mode of Communication:  Video Conference via Medical Zoom    As the provider I attest to compliance with applicable laws and regulations related to telemedicine.                Data:    Session content: At the start of this group, patients were invited to check in by identifying themselves, describing their current emotional status, and identifying issues to address in this group.   Area(s) of treatment focus  "addressed in this session included Symptom Management and Personal Safety.    Kandy reported feeling \"optimistic\" today.  Her goal today is to get a meeting tonight.      Therapeutic Interventions/Treatment Strategies:  Psychotherapist offered support, feedback and validation and reinforced use of skills.    Assessment:    Patient response:   Patient responded to session by accepting feedback, giving feedback and listening    Possible barriers to participation / learning include: and no barriers identified    Health Issues:   None reported       Substance Use Review:   Substance Use: methamphetamine .  and Last use: 3/27/22    Mental Status/Behavioral Observations  Appearance:   Appropriate   Eye Contact:   Good   Psychomotor Behavior: Normal   Attitude:   Cooperative   Orientation:   All  Speech   Rate / Production: Normal    Volume:  Normal   Mood:    Anxious   Affect:    Appropriate   Thought Content:   Clear  Thought Form:  Coherent  Logical     Insight:    Good     Plan:     Safety Plan: No current safety concerns identified.  Recommended that patient call 911 or go to the local ED should there be a change in any of these risk factors.     Barriers to treatment: None identified    Patient Contracts (see media tab):  None    Substance Use: Provided encouragement towards sobriety    Provided support and affirmation for steps taken towards sobriety      Continue or Discharge: Patient will continue in Adult Dual Disorder Program (DDP) as planned. Patient is likely to benefit from learning and using skills as they work toward the goals identified in their treatment plan.      Liliana Olson, [facility]  April 11, 2022    "

## 2022-04-11 NOTE — GROUP NOTE
Psychoeducation Group Note    PATIENT'S NAME: Kandy Yañez  MRN:   [id number]  :   1975  ACCT. NUMBER: [id number]  DATE OF SERVICE: 22  START TIME: 10:00 AM  END TIME: 10:50 AM  FACILITATOR: Christel Cisneros LICSW  TOPIC: MH Life Skills Group: Resiliency Development  [facility] Adult Dual Diagnosis Day Treatment    This group co-facilitated by:  SUSHANT Pressley, DK and Virginia Lino MA, OTR/L    TRACK: DDP 1    NUMBER OF PARTICIPANTS: 3    Summary of Group / Topics Discussed:   Resiliency Development:  Stress Resiliency - Positive Focus: Patients were given the opportunity to reflect on the role of stress and overcoming life's challenges.  Patients were provided education on the importance of mindset regarding stress as a marker of engagement and level of meaning assigned to the pursuit of something. Patients were given positive examples of stress and its role in mental health wellbeing.  Patients were also given the opportunity to improve self-efficacy, self-sufficiency, quality of life and a sense of mastery and competency by identifying positive outcomes of stress in their life to instill hope.        Patient Session Goals / Objectives:     Identified personal strength in overcoming previous circumstances     Improved awareness of positive qualities of stress and how this contributes to the process of recovery and mental health wellbeing and resiliency       Reflected on how these views can be applied to their own environments                                       Service Modality:  Video Visit     Telemedicine Visit: The patient's condition can be safely assessed and treated via synchronous audio and visual telemedicine encounter.      Reason for Telemedicine Visit: Services only offered telehealth    Originating Site (Patient Location): Patient's home    Distant Site (Provider Location): Provider Remote Setting- Home Office    Consent:  The patient/guardian has verbally  consented to: the potential risks and benefits of telemedicine (video visit) versus in person care; bill my insurance or make self-payment for services provided; and responsibility for payment of non-covered services.     Patient would like the video invitation sent by:  My Chart    Mode of Communication:  Video Conference via Medical Zoom    As the provider I attest to compliance with applicable laws and regulations related to telemedicine.       Patient Participation / Response:  Fully participated with the group by sharing personal reflections / insights and openly received / provided feedback with other participants.    Verbalized understanding of content    Treatment Plan:  Patient has an initial individualized treatment plan that was created as part of their diagnostic assessment / admission process.  A master individualized treatment plan is in the process of being developed with the patient and multi-disciplinary care team.    KINGSLEY PakSW

## 2022-04-11 NOTE — GROUP NOTE
Psychotherapy Group Note    PATIENT'S NAME: Kandy Yañez  MRN:   [id number]  :   1975  ACCT. NUMBER: [id number]  DATE OF SERVICE: 22  START TIME: 11:00 AM  END TIME: 11:50 AM  FACILITATOR: Christel Cisneros LICSW  TOPIC: MH EBP Group: Emotions Management  [facility] Adult Dual Diagnosis Day Treatment  TRACK: DDP 1    NUMBER OF PARTICIPANTS: 3    Summary of Group / Topics Discussed:  Emotions Management: Anger: Patients explored and shared personal experiences associated with feelings of anger.  Group explored how these feelings develop, what they mean to each individual, and how to increase acceptance and usefulness of these feelings.  Discussed anger as a  secondary  emotion and reviewed ways to manage anger and challenge associated cognitive distortions. Group members worked to contextualize these concepts and promote healing.     Patient Session Goals / Objectives:    Discuss and review definitions and personal views/experiences with anger    Explore how feelings of anger impact functioning    Understand and practice strategies to manage difficult emotions and move towards healing    Demonstrate understanding of the feelings of anger    Verbalize how these emotions have impacted their lives/functioning    Verbalize of knowledge gained and possible interventions to manage feelings                                      Service Modality:  Video Visit     Telemedicine Visit: The patient's condition can be safely assessed and treated via synchronous audio and visual telemedicine encounter.      Reason for Telemedicine Visit: Services only offered telehealth    Originating Site (Patient Location): Patient's home    Distant Site (Provider Location): Provider Remote Setting- Home Office    Consent:  The patient/guardian has verbally consented to: the potential risks and benefits of telemedicine (video visit) versus in person care; bill my insurance or make self-payment for services  provided; and responsibility for payment of non-covered services.     Patient would like the video invitation sent by:  My Chart    Mode of Communication:  Video Conference via Medical Zoom    As the provider I attest to compliance with applicable laws and regulations related to telemedicine.            Patient Participation / Response:  Fully participated with the group by sharing personal reflections / insights and openly received / provided feedback with other participants.    Demonstrated understanding of topics discussed through group discussion and participation    Treatment Plan:  Patient has an initial individualized treatment plan that was created as part of their diagnostic assessment / admission process.  A master individualized treatment plan is in the process of being developed with the patient and multi-disciplinary care team.    Christel Cisneros, [facility]

## 2022-04-12 ENCOUNTER — HOSPITAL ENCOUNTER (OUTPATIENT)
Dept: BEHAVIORAL HEALTH | Facility: CLINIC | Age: 47
Discharge: HOME | End: 2022-04-12
Attending: PSYCHIATRY & NEUROLOGY
Payer: COMMERCIAL

## 2022-04-12 PROCEDURE — 90853 GROUP PSYCHOTHERAPY: CPT | Mod: GT,95 | Performed by: SOCIAL WORKER

## 2022-04-12 PROCEDURE — 90853 GROUP PSYCHOTHERAPY: CPT | Mod: GT,95 | Performed by: COUNSELOR

## 2022-04-12 NOTE — GROUP NOTE
Psychotherapy Group Note    PATIENT'S NAME: Kandy Yañez  MRN:   [id number]  :   1975  ACCT. NUMBER: [id number]  DATE OF SERVICE: 22  START TIME: 10:00 AM  END TIME: 10:50 AM  FACILITATOR: Liliana Olson LPCC  TOPIC: MH EBP Group: Emotions Management  [facility] Adult Dual Diagnosis Day Treatment  TRACK: 1    NUMBER OF PARTICIPANTS: 4                                      Service Modality:  Video Visit     Telemedicine Visit: The patient's condition can be safely assessed and treated via synchronous audio and visual telemedicine encounter.      Reason for Telemedicine Visit: Services only offered telehealth    Originating Site (Patient Location): Patient's home    Distant Site (Provider Location): Provider Remote Setting- Home Office    Consent:  The patient/guardian has verbally consented to: the potential risks and benefits of telemedicine (video visit) versus in person care; bill my insurance or make self-payment for services provided; and responsibility for payment of non-covered services.     Patient would like the video invitation sent by:  My Chart    Mode of Communication:  Video Conference via Medical Zoom    As the provider I attest to compliance with applicable laws and regulations related to telemedicine.          Summary of Group / Topics Discussed:  Emotions Management: Guilt and Shame: Patients explored and shared personal experiences associated with feelings of guilt and shame.  Group explored how these feelings develop, what they mean to each individual, and how to increase acceptance and usefulness of these feelings.  Group members assisted one another to contextualize these concepts and promote healing.     Patient Session Goals / Objectives:    Discuss and review definitions and personal views/experiences with guilt and shame    Understand the differences between guilt and shame    Explore how feelings of guilt and shame impact functioning    Understand and practice  strategies to manage difficult emotions and move towards healing    Understand and normalize difficult emotions through group discussion    Understand the utility of guilt and shame    Target  unwanted  emotions for change      Patient Participation / Response:  Fully participated with the group by sharing personal reflections / insights and openly received / provided feedback with other participants.    Demonstrated understanding of topics discussed through group discussion and participation, Expressed understanding of the relevance / importance of emotions management skills at distressing times in life, Self-aware of experiences with difficult emotions, and strategies to employ to manage them and Demonstrated knowledge of when to consider applying a variety of emotions management skills in daily life    Treatment Plan:  Patient has a current master individualized treatment plan.  See Epic treatment plan for more information.    Liliana Olson, [facility]

## 2022-04-12 NOTE — GROUP NOTE
Process Group Note    PATIENT'S NAME: Kandy Yañez  MRN:   [id number]  :   1975  ACCT. NUMBER: [id number]  DATE OF SERVICE: 22  START TIME:  9:00 AM  END TIME:  9:50 AM  FACILITATOR: Liliana Olson [facility]  TOPIC:  Process Group    Diagnoses:  Bipolar I Disorder Current or Most Recent Episode Manic, Mild   300.02 (F41.1) Generalized Anxiety Disorder.  4. Other Diagnoses that is relevant to services:   Substance-Related & Addictive Disorders Stimulant Use Disorder: Specify current severity:  Severe  304.20 (F14.20)Amphetamine type substance, and Severe, Cocaine, Specify if: in remission, Tobacco Use Disorder, Specify current severity:  305.1(F17.200) Moderate, per history PTSD.       [facility] Adult Dual Diagnosis Day Treatment  TRACK: 1    NUMBER OF PARTICIPANTS: 4                                      Service Modality:  Video Visit     Telemedicine Visit: The patient's condition can be safely assessed and treated via synchronous audio and visual telemedicine encounter.      Reason for Telemedicine Visit: Services only offered telehealth    Originating Site (Patient Location): Patient's home    Distant Site (Provider Location): Provider Remote Setting- Home Office    Consent:  The patient/guardian has verbally consented to: the potential risks and benefits of telemedicine (video visit) versus in person care; bill my insurance or make self-payment for services provided; and responsibility for payment of non-covered services.     Patient would like the video invitation sent by:  My Chart    Mode of Communication:  Video Conference via Medical Zoom    As the provider I attest to compliance with applicable laws and regulations related to telemedicine.                Data:    Session content: At the start of this group, patients were invited to check in by identifying themselves, describing their current emotional status, and identifying issues to address in this group.   Area(s) of treatment focus  "addressed in this session included Symptom Management, Personal Safety and Abstinence/Relapse Prevention.    Kandy reported feeling \"happy\".  Her goal is to make a phone calls to follow up about her new social security card.  She sought some feedback from group peers on how to manage her frustration/anger..     Therapeutic Interventions/Treatment Strategies:  Psychotherapist offered support, feedback and validation and reinforced use of skills. Treatment modalities used include Motivational Interviewing, Cognitive Behavioral Therapy and Dialectical Behavioral Therapy. Interventions include Emotions Management:  Reviewed opposite action skill.    Assessment:    Patient response:   Patient responded to session by accepting feedback, giving feedback and listening    Possible barriers to participation / learning include: and no barriers identified    Health Issues:   None reported       Substance Use Review:   Substance Use: Substance use has decreased    Mental Status/Behavioral Observations  Appearance:   Appropriate   Eye Contact:   Good   Psychomotor Behavior: Normal   Attitude:   Cooperative   Orientation:   All  Speech   Rate / Production: Normal    Volume:  Normal   Mood:    Normal  Affect:    Appropriate   Thought Content:   Clear  Thought Form:  Coherent  Logical     Insight:    Good     Plan:     Safety Plan: No current safety concerns identified.  Recommended that patient call 911 or go to the local ED should there be a change in any of these risk factors.     Barriers to treatment: None identified    Patient Contracts (see media tab):  None    Substance Use: Provided encouragement towards sobriety    Provided support and affirmation for steps taken towards sobriety      Continue or Discharge: Patient will continue in Adult Dual Disorder Program (DDP) as planned. Patient is likely to benefit from learning and using skills as they work toward the goals identified in their treatment plan.      Liliana Olson, " [facility]  April 12, 2022

## 2022-04-12 NOTE — GROUP NOTE
Psychoeducation Group Note    PATIENT'S NAME: Kadny Yañez  MRN:   [id number]  :   1975  [id number]  DATE OF SERVICE: 22  START TIME: 11:00 AM  END TIME: 11:50 AM  FACILITATOR: Christel Cisneros LICSW; Norberto Burnham RN  TOPIC:  Wellness Group: Medication Education and Management  [facility] Mental Health Day Treatment  TRACK: DDP 1    NUMBER OF PARTICIPANTS: 4    Summary of Group / Topics Discussed:  Medication Educations and Management:  Medication Categories: Patient were provided with a brief overview of how SSRIs and SNRIs are believed to function in the brain to treat mental illness. Patients asked questions and received information about potential side effects, and identified some medications that have a higher likelihood of withdrawal symptoms. Patients learned about how their medications work to treat their illness and the importance of talking with providers about concerns related to medication side effects.    Patient Session Goals / Objectives:  ? Listed the four major categories of psychotropic medications (antidepressants, antipsychotics, mood stabilizers, anti-anxiety)  ? Identified medications in each category and important adverse reactions/contraindications for use  ? Explained how the medications they take work to treat their illness                                       Service Modality:  Video Visit     Telemedicine Visit: The patient's condition can be safely assessed and treated via synchronous audio and visual telemedicine encounter.      Reason for Telemedicine Visit: Services only offered telehealth    Originating Site (Patient Location): Patient's home    Distant Site (Provider Location): Provider Remote Setting- Home Office    Consent:  The patient/guardian has verbally consented to: the potential risks and benefits of telemedicine (video visit) versus in person care; bill my insurance or make self-payment for services provided; and  responsibility for payment of non-covered services.     Patient would like the video invitation sent by:  My Chart    Mode of Communication:  Video Conference via Medical Zoom    As the provider I attest to compliance with applicable laws and regulations related to telemedicine.          Patient Participation / Response:  Fully participated with the group by sharing personal reflections / insights and openly received / provided feedback with other participants.     Demonstrated understanding of topics discussed through group discussion and participation and Verbalized understanding of medication education and management topic    Treatment Plan:  Patient has an initial individualized treatment plan that was created as part of their diagnostic assessment / admission process.  A master individualized treatment plan is in the process of being developed with the patient and multi-disciplinary care team.    Christel Cisneros, [facility]

## 2022-04-13 ENCOUNTER — HOSPITAL ENCOUNTER (OUTPATIENT)
Dept: BEHAVIORAL HEALTH | Facility: CLINIC | Age: 47
Discharge: HOME | End: 2022-04-13
Attending: PSYCHIATRY & NEUROLOGY | Admitting: PSYCHIATRY & NEUROLOGY
Payer: COMMERCIAL

## 2022-04-13 DIAGNOSIS — F41.1 GENERALIZED ANXIETY DISORDER: ICD-10-CM

## 2022-04-13 DIAGNOSIS — F15.90 STIMULANT USE DISORDER: ICD-10-CM

## 2022-04-13 DIAGNOSIS — F31.9 BIPOLAR I DISORDER (H): Primary | ICD-10-CM

## 2022-04-13 PROCEDURE — 99205 OFFICE O/P NEW HI 60 MIN: CPT | Mod: 95 | Performed by: PSYCHIATRY & NEUROLOGY

## 2022-04-13 RX ORDER — DIVALPROEX SODIUM 250 MG/1
250 TABLET, DELAYED RELEASE ORAL 3 TIMES DAILY
COMMUNITY
End: 2022-05-02

## 2022-04-13 RX ORDER — MULTIPLE VITAMINS W/ MINERALS TAB 9MG-400MCG
1 TAB ORAL DAILY
COMMUNITY

## 2022-04-13 NOTE — PROGRESS NOTES
"[facility]   Adult Mental Health Outpatient Programs  Provider Intake Note    Program: Dual Disorder Program   Track: 1    Patient: Kandy Yañez  MRN: [id number]  : 1975  Acct. No.: [id number]  Date of Service:  22  Session Start Time:  939  Session End Time:        Diagnostic Assessment Date: 3/21/2022    Outpatient Providers:  Current Outpatient Psychiatric Provider: Dr. Darlene Anderson at [facility]   Current Outpatient Individual Psychotherapist: none; referral pending   Primary Care Provider: none yet, will be working on referrals     Identifying Data:  Kandy Yañez, a 46 year old-year-old female with history of bipolar, stimulant use disorder, presents for initial visit with me to provide oversight of programmatic care. Patient attended the phone/video session alone and prefers to be called: \"Kandy.\"    Presenting Concern:  \"I have always since a young age experimented with drugs and alcohol...\"    History of Present Illness:  Chart reviewed, history as documented reviewed with Kandy. Patient endorses:    \"Dabbled\" in substance use until 20 years ago    Hospitalized, diagnosed with bipolar disorder I, depression, anxiety     Was subsequently in recovery 9 years    Relapsed, addicted to crack cocaine for several years after that    Back in recovery 3-5 years, relapsed to crack cocaine    switched to smoking crystal meth for last couple of years    Endorses sexual promiscuity, prostitution related to destiny, drug use  o Also theft history    Recent DUI, rule 25 assessment leading to this referral    Clean 3-4 weeks    Has moved in with momyola in [place]    Not currently in relationship, \"trying to learn about myself, be comfortable being single\"    Try to involve self in [employer] more    Trying to learn more about bipolar disorder   o Coming to terms with idea that I have to keep taking medication  - History stopping medications when feeling better, " "has come to realization that this will require treatment over the long term    Has had difficulty getting in to see a doctor, recently started with temporary provider (Dr. Betancourt)    After 1 week on lithium:  o Losing voice, couldn't breathe, headache, bone aches  o Discontinued    Previously good results with trazodone, hydroxyzine, valproate, venlafaxine   o Avoiding trazodone since it is also an antidepressant  o Medications per below    Working on setting up routine in her life generally      Goals for Treatment (in addition to those goals listed in the BEH Treatment Plan Encounter):    \"I would like to have more self-awareness and able to identify how I'm feeling\"    \"be more mindful\"       Psychiatric Review of Symptoms:  Review of systems recorded in diagnostic assessment reviewed with patient.  Today notes:    Sleep is good, current medications helping get to, stay asleep    \"hard to say -- I'm in a stable environment\"    \"I don't feel depression\"    Poor motivation/drive    Struggling to complete some tasks, more distractible    Frustrated, irritated more easily, \"but not all over the map there\"    Appetite improving    Not avoiding friends, etc. anymore      Safety Assessment:    Suicidal ideation: denies current or recent suicidal ideation or behavior    Thoughts of non-suicidal self-injury: denied    Recent self-injurious behavior: denied    Homicidal ideation: denied    Other safety concerns: denied    Substance use:    per diagnostic assessment:  o Alcohol in the past, none recent  o Marijuana infrequently    Today endorses continued abstinence from all substances of abuse save caffeine and nicotine    Medications:  Current Outpatient Medications   Medication Sig Dispense Refill     divalproex sodium delayed-release (DEPAKOTE) 250 MG DR tablet Take 250 mg by mouth 3 times daily 1 in the morning and 2 at bedtime       hydrOXYzine pamoate (VISTARIL) 50 MG capsule [HYDROXYZINE PAMOATE (VISTARIL) 50 MG " "CAPSULE] Take 1 capsule (50 mg total) by mouth 3 (three) times a day as needed for anxiety. (Patient taking differently: Take  mg by mouth nightly as needed) 30 capsule 5     melatonin 5 MG CAPS        multivitamin w/minerals (MULTI-VITAMIN) tablet Take 1 tablet by mouth daily       venlafaxine (EFFEXOR) 37.5 MG tablet Take 37.5 mg by mouth every morning       nicotine (NICODERM CQ) 21 mg/24 hr [NICOTINE (NICODERM CQ) 21 MG/24 HR] Place 1 patch on the skin daily. (Patient not taking: Reported on 2/28/2022) 7 patch 0     nicotine (NICODERM CQ) 7 mg/24 hr [NICOTINE (NICODERM CQ) 7 MG/24 HR] Place 1 patch on the skin daily. (Patient not taking: Reported on 2/28/2022) 7 patch 0       The above list was reviewed and updated in EPIC with patient today.     Patient is taking medications as prescribed and denies adverse effects.    Medical Review of Systems:  Pertinent: nausea, vertigo, earlier this week, relates it to recent diet changes, constipation      Recent Screenings:  WHODAS 2.0 Total Score 3/21/2022   Total Score 23       Metrics:  PHQ-9 scores:   PHQ-9 SCORE 2/28/2022 3/21/2022 4/7/2022   PHQ-9 Total Score MyChart - - 20 (Severe depression)   PHQ-9 Total Score 15 11 20       JAYLIN-7 scores:   JAYLIN-7 SCORE 2/28/2022 3/21/2022 4/7/2022   Total Score - - 14 (moderate anxiety)   Total Score 12 10 14       CSSR-S:   PHQ 4/7/2022   PHQ-9 Total Score 20   Q9: Thoughts of better off dead/self-harm past 2 weeks Not at all         Psychiatric History:   Outpatient providers listed above.    Past medication trials include:    Quetiapine - \"made me a zombie\"    Aripiprazole - messed with my libido    Bupropion made me manic    Otherwise as noted above or in diagnostic assessment.       Substance Use History:  As noted above or in diagnostic assessment.     Past Medical History:  As noted above or in diagnostic assessment.     Vital Signs:  None since this is a phone/video visit.     Labs:  Most recent labs reviewed. " "Pertinent updates/findings: None.     Family History:   As noted above or in diagnostic assessment.     Social History:   As noted above or in diagnostic assessment.     Legal History:  As noted above or in diagnostic assessment.     Significant Losses / Trauma / Abuse / Neglect Issues:  As noted above or in diagnostic assessment.       Mental Status Examination (limited due to video virtual visit format):  Vital Signs: There were no vitals taken for this virtual visit.  Appearance: appropriately groomed, appears stated age, and in no apparent distress.  Attitude: cooperative   Eye Contact: good to the extent that can be determined in a video visit  Muscle Strength and Tone: no gross abnormalities based on remote observation  Psychomotor Behavior: Appropriate and Calm; no evidence of tardive dyskinesia, dystonia, or tics based on remote observation  Gait and Station: normal, no gross abnormalities based on remote observation  Speech: clear, coherent, normal prosody, regular rate, regular rhythm and fluent  Associations: No loosening of associations  Thought Process: coherent and goal directed  Thought Content: no evidence of suicidal ideation or homicidal ideation, no evidence of psychotic thought, no auditory hallucinations present and no visual hallucinations present  Mood: \"better\"  Affect: mood congruent, intensity is normal, constricted mobility and reactive  Insight: good  Judgment: intact, adequate for safety  Impulse Control: intact  Oriented to: time, place, person and situation  Attention Span and Concentration: normal  Language: Intact  Recent and Remote Memory: intact to interview. Not formally assessed. No amnesia.  Fund of Knowledge/Assessment of Intelligence: Average  Capacity of Activities of Daily Living: Independent, able to participate in programmatic care services.      DSM5 Diagnosis/es:  1. 296.41 Bipolar I Disorder Current or Most Recent Episode Manic, Mild   2. 300.02 (F41.1) Generalized " Anxiety Disorder.  3. Substance-Related & Addictive Disorders Stimulant Use Disorder, 304.20 (F14.20) Severe, Cocaine   4. Substance-Related & Addictive Disorders Stimulant Use Disorder:  304.40 (F15.20) Severe, Amphetamine type substance  5. Tobacco Use Disorder, Specify current severity:  305.1(F17.200) Moderate  6. PTSD by history        Assessment/Plan:  Kandy presents today for initial visit with me as part of program intake, coordination, and supervision.  Patient presents with excellent insight.  Substance use disorders or not, by definition, in early remission yet as 3 months of abstinence are required for this diagnosis.  Nevertheless, has remained abstinent for the last several weeks and has taken steps to maintain that sobriety.    Patient is diagnosed both with bipolar disorder and stimulant use disorders.  Of course, destiny and stimulant intoxication can mimic one another so continued observation while sober may shed additional light.  Currently patient is presenting with mild depressive symptoms (anhedonia, poor motivation, limited concentration) but overall symptoms are well controlled.    Current dose of divalproex would likely result in a subtherapeutic level but we will confirm this.  We will also check liver function tests but as there is no indication to suggest toxicity, I will not check ammonia.  Even if valproate level comes back subtherapeutic, there is no acute indication for change in dose and we will adjust medications in response to clinical need.  Even a low-dose of an SNRI can provoke destiny but appears to be therapeutic at this time.      No changes to medication today.     Patient is in early phases of sobriety and we are entering the time of year where destiny is more likely.  Patient substance use was associated with a number of dangerous behaviors and therefore continued treatment at this level of care is most appropriate to prevent decompensation and further harm.  Admit to dual  disorder program.      Bipolar disorder, depressed  o Overall mild symptoms today  o Concerns regarding overall stability given discussion above  o Continue current medications  o Check valproate level, liver functions  o Enroll in dual disorder program      Anxiety  o More pronounced compared to mood  o Treatment per above      Stimulant use disorders  o Currently abstinent  o Not meeting criteria for early remission yet  o Treatment per above  o Can consider augmentation with, e.g., naltrexone in the future      Tobacco use  o Ongoing  o Patient prefers to focus on other substance use disorders for the time being and is not making use of prescribed nicotine patches  o Continue to monitor      PTSD by history  o Diagnostic is ongoing  o Treatment per above      Risk Assessment  o Today Kandy denied suicidal ideation or thoughts of harming self or others  o She is future-oriented and engaged in treatment planning   o I do not feel that Kandy meets criteria for a 72-hour involuntary hold and remains appropriate for an outpatient level of care.     Continue therapy as planned:    Enrolled in Dual Disorder Program    Patient continues to meet criteria for recommended level of care.    Patient is expected to make a timely and significant improvement in the presenting acute symptoms as a result of participation in this program.    Patient would be at reasonable risk of requiring a higher level of care in the absence of current services.    Continue with individual therapist as appropriate    Safety plan reviewed.     To the Emergency Department as needed or call after hours crisis line at [phone] or [phone]. [place] Crisis Text Line: Text [place] to 896250  or  Suicide LifeLine Chat: suicidepreventionlifeline.org/chat    Follow-up:     schedule an appointment with me or another program provider in approximately 4 weeks or sooner if needed.  Can speak with a staff member or call the appropriate program number (see  below) to schedule    Follow up with outpatient provider(s) as planned or sooner if needed for acute medical concerns.    Questions or concerns:    Call program line with questions or concerns (see below)    [employer] may be used to communicate with your provider, but this is not intended to be used for emergencies.      [facility] Adult Mental Health Program lines:  [facility]: [phone]  Dual Disorder: [phone]  Adult Day Treatment:  [phone]  55+/Intensive Outpatient: [phone]      Community Resources:    National Suicide Prevention Lifeline: 936.393.1742 (TTY: 464.990.8017). Call anytime for help.  (www.suicidepreventionlifeline.org)  National [place] on Mental Illness (www.norris.org): 951.773.9704 or 906-197-2743.   Mental Health Association (www.mentalhealth.org): 788.632.3467 or 456-414-2684.  [place] Crisis Text Line: Text [place] to 326321  Suicide LifeLine Chat: suicideTDXline.org/chat    Treatment Objective(s) Addressed in This Session:  One purpose of today's call is for this writer to provide oversight of patient's care while receiving program services. Specific treatment goals addressed included personal safety, symptoms stabilization and management, wellness and mental health, and community resources/discharge planning.     Patient agrees with the current plan of care.    Signed:   Sriram Hines MD   April 13, 2022      Visit Details:  Type of service:  Video Visit    Start/End Time: see above    Originating Location (pt. Location): Home in [place]    Distant Location (provider location): Provider Remote Setting- Home Office    Platform used for Video Visit: Mt    Physician has received verbal consent for a Video Visit from the patient? Yes    65 minutes spent on the date of the encounter doing chart review, patient visit, documentation and discussion with other provider(s)     This document completed in part using Dragon Medical One dictation software.  Please  excuse any inadvertent word or phrase substitutions.

## 2022-04-14 ENCOUNTER — HOSPITAL ENCOUNTER (OUTPATIENT)
Dept: BEHAVIORAL HEALTH | Facility: CLINIC | Age: 47
Discharge: HOME | End: 2022-04-14
Attending: PSYCHIATRY & NEUROLOGY
Payer: COMMERCIAL

## 2022-04-14 PROCEDURE — 90853 GROUP PSYCHOTHERAPY: CPT | Mod: GT,95 | Performed by: SOCIAL WORKER

## 2022-04-14 PROCEDURE — 90853 GROUP PSYCHOTHERAPY: CPT | Mod: GT,95 | Performed by: COUNSELOR

## 2022-04-14 NOTE — GROUP NOTE
Psychoeducation Group Note    PATIENT'S NAME: Kandy Yañez  MRN:   [id number]  :   1975  ACCT. NUMBER: [id number]  DATE OF SERVICE: 22  START TIME: 11:00 AM  END TIME: 11:50 AM  FACILITATOR: Christel Cisneros LICSW; Norberto Burnham RN  TOPIC:  Wellness Group: [facility] Adult Dual Diagnosis Day Treatment  TRACK: DDP 1    NUMBER OF PARTICIPANTS: 5    Summary of Group / Topics Discussed:  Foundations of Health: Sleep: The circadian rhythm and sleep hygiene: Patients explored the connection between sleep and mental illness. Patients learned how the body adapts to changes in light and dark, and how they can adapt this to their own sleep/wake cycle. Specific techniques were taught on how to adjust to daylight savings time changes to prevent Seasonal Affective Disorder or trigger a  manic episode in persons with Bipolar I.      Patient Session Goals / Objectives:  ? Demonstrated understanding of sleep hygiene practices and benefits of sleep  ? Identified sleep hygiene strategies to utilize     Described the connection between sleep disturbances and mental illness      Patient Participation / Response:  Fully participated with the group by sharing personal reflections / insights and openly received / provided feedback with other participants.    Demonstrated understanding of topics discussed through group discussion and participation and Verbalized understanding of foundations of health topic    Treatment Plan:  Patient has a current master individualized treatment plan.  See Epic treatment plan for more information.    DK Pak

## 2022-04-14 NOTE — GROUP NOTE
"Psychoeducation Group Note    PATIENT'S NAME: Kandy Yañez  MRN:   [id number]  :   1975  [id number]  DATE OF SERVICE: 22  START TIME: 10:00 AM  END TIME: 10:50 AM  FACILITATOR: Christel Cisneros LICSW, Annika Ewing, OTR/L  TOPIC: MH Life Skills Group: Cognitive Functioning  [facility] Adult Dual Diagnosis Day Treatment  TRACK: DDP 1    NUMBER OF PARTICIPANTS: 5    Summary of Group / Topics Discussed:  Cognitive Functioning: Patients were provided with an opportunity to gain awareness of their personal recovery process. Patients completed a \"personal recovery inventory\" verbally in group, and copies of the handout sent to them electronically. Patients identified cognitive benefits of recovery such as clearer thinking, improved relationships, and more effective decision-making. Patients identified what has helped them in their recovery journey thus far, and were taught ways to manage overwhelming emotions without the use of substances.Patients were given opportunities to practice taught skills and techniques in session and how to apply to everyday life.        Patient Session Goals / Objectives:    Identified how their mental health symptoms impact their functioning, focusing on specific cognitive challenges     Improved awareness of specific remediation strategies to improve executive functioning skills and how this relates to mental health recovery        Established a plan for practice of these skills in their own environments    Practiced and reflected on how to generalize taught skills to their everyday life                                    Service Modality:  Video Visit     Telemedicine Visit: The patient's condition can be safely assessed and treated via synchronous audio and visual telemedicine encounter.      Reason for Telemedicine Visit: Services only offered telehealth    Originating Site (Patient Location): Patient's home    Distant Site (Provider Location): " Provider Remote Setting- Home Office    Consent:  The patient/guardian has verbally consented to: the potential risks and benefits of telemedicine (video visit) versus in person care; bill my insurance or make self-payment for services provided; and responsibility for payment of non-covered services.     Patient would like the video invitation sent by:  My Chart    Mode of Communication:  Video Conference via Medical Zoom    As the provider I attest to compliance with applicable laws and regulations related to telemedicine.            Patient Participation / Response:  Fully participated with the group by sharing personal reflections / insights and openly received / provided feedback with other participants.    Demonstrated understanding of content through sharing how information about sleep patterns would benefit her management of  her Bipolar I Disorder.  and Verbalized understanding of content    Treatment Plan:  Patient has a current master individualized treatment plan.  See Epic treatment plan for more information.    Christel Cisneros, LICSW

## 2022-04-14 NOTE — PROGRESS NOTES
Adult Dual Disorder Program:   Acknowledgement of Current Treatment Plan     I have reviewed my treatment plan with my therapist on 4/14/22.   I agree with the plan as it is written in the electronic health record.    Name:                  Signature:  Kandy POLLOCK Allisonghassan       Unavailable to sign due to COVID-19     FOREIGN Will, [facility]  Psychotherapist   FOREIGN Anna on 4/14/2022 at 10:00 AM       Sriram Hines MD  Psychiatrist/Medical Director Sriram Hines MD on 4/14/2022 at 4:24 PM

## 2022-04-14 NOTE — GROUP NOTE
Process Group Note    PATIENT'S NAME: Kandy Yañez  MRN:   [id number]  :   1975  ACCT. NUMBER: [id number]  DATE OF SERVICE: 22  START TIME:  9:00 AM  END TIME:  9:50 AM  FACILITATOR: Liliana Olson [facility]  TOPIC:  Process Group    Diagnoses:  Bipolar I Disorder Current or Most Recent Episode Manic, Mild   300.02 (F41.1) Generalized Anxiety Disorder.  4. Other Diagnoses that is relevant to services:   Substance-Related & Addictive Disorders Stimulant Use Disorder: Specify current severity:  Severe  304.20 (F14.20)Amphetamine type substance, and Severe, Cocaine, Specify if: in remission, Tobacco Use Disorder, Specify current severity:  305.1(F17.200) Moderate, per history PTSD.       [facility] Adult Dual Diagnosis Day Treatment  TRACK: 1    NUMBER OF PARTICIPANTS: 5                                      Service Modality:  Video Visit     Telemedicine Visit: The patient's condition can be safely assessed and treated via synchronous audio and visual telemedicine encounter.      Reason for Telemedicine Visit: Services only offered telehealth    Originating Site (Patient Location): Patient's home    Distant Site (Provider Location): Provider Remote Setting- Home Office    Consent:  The patient/guardian has verbally consented to: the potential risks and benefits of telemedicine (video visit) versus in person care; bill my insurance or make self-payment for services provided; and responsibility for payment of non-covered services.     Patient would like the video invitation sent by:  My Chart    Mode of Communication:  Video Conference via Medical Zoom    As the provider I attest to compliance with applicable laws and regulations related to telemedicine.                Data:    Session content: At the start of this group, patients were invited to check in by identifying themselves, describing their current emotional status, and identifying issues to address in this group.   Area(s) of treatment focus  "addressed in this session included Symptom Management, Personal Safety and Abstinence/Relapse Prevention.    Kandy reported feeling \"productive\" and \"proud\" of how much she got done yesterday. Her goal today is to continue working on her social security/umemployment/taxes. Patient declined additional process time but contributed to group discussion and provided feedback and support to peers.      Therapeutic Interventions/Treatment Strategies:  Psychotherapist offered support, feedback and validation and reinforced use of skills.    Assessment:    Patient response:   Patient responded to session by accepting feedback, giving feedback and listening    Possible barriers to participation / learning include: and no barriers identified    Health Issues:   None reported       Substance Use Review:   Substance Use: Substance use has decreased    Mental Status/Behavioral Observations  Appearance:   Appropriate   Eye Contact:   Good   Psychomotor Behavior: Normal   Attitude:   Cooperative   Orientation:   All  Speech   Rate / Production: Normal    Volume:  Normal   Mood:    Normal  Affect:    Appropriate   Thought Content:   Clear  Thought Form:  Coherent  Logical     Insight:    Good     Plan:     Safety Plan: No current safety concerns identified.  Recommended that patient call 911 or go to the local ED should there be a change in any of these risk factors.     Barriers to treatment: None identified    Patient Contracts (see media tab):  None    Substance Use: Provided encouragement towards sobriety    Provided support and affirmation for steps taken towards sobriety      Continue or Discharge: Patient will continue in Adult Dual Disorder Program (DDP) as planned. Patient is likely to benefit from learning and using skills as they work toward the goals identified in their treatment plan.      Liliana Olson, [facility]  April 14, 2022    "

## 2022-04-15 ENCOUNTER — HOSPITAL ENCOUNTER (OUTPATIENT)
Dept: BEHAVIORAL HEALTH | Facility: CLINIC | Age: 47
Discharge: HOME | End: 2022-04-15
Attending: PSYCHIATRY & NEUROLOGY
Payer: COMMERCIAL

## 2022-04-15 DIAGNOSIS — F15.90 STIMULANT USE DISORDER: Primary | ICD-10-CM

## 2022-04-15 PROCEDURE — 90853 GROUP PSYCHOTHERAPY: CPT | Mod: GT,95 | Performed by: COUNSELOR

## 2022-04-15 PROCEDURE — 90853 GROUP PSYCHOTHERAPY: CPT | Mod: GT,95 | Performed by: SOCIAL WORKER

## 2022-04-15 NOTE — GROUP NOTE
Psychotherapy Group Note    PATIENT'S NAME: Kandy Yañez  MRN:   [id number]  :   1975  [id number]  DATE OF SERVICE: 4/15/22  START TIME: 10:00 AM  END TIME: 10:50 AM  FACILITATOR: Liliana Olson LPCC  TOPIC:  EBP Group: DDP Relapse Prevention  [facility] Adult Dual Diagnosis Day Treatment  TRACK: 1    NUMBER OF PARTICIPANTS: 4                                      Service Modality:  Video Visit     Telemedicine Visit: The patient's condition can be safely assessed and treated via synchronous audio and visual telemedicine encounter.      Reason for Telemedicine Visit: Services only offered telehealth    Originating Site (Patient Location): Patient's home    Distant Site (Provider Location): Provider Remote Setting- Home Office    Consent:  The patient/guardian has verbally consented to: the potential risks and benefits of telemedicine (video visit) versus in person care; bill my insurance or make self-payment for services provided; and responsibility for payment of non-covered services.     Patient would like the video invitation sent by:  My Chart    Mode of Communication:  Video Conference via Medical Zoom    As the provider I attest to compliance with applicable laws and regulations related to telemedicine.          Summary of Group / Topics Discussed:  DDP Relapse Prevention: Observing and Describing Triggers for Substance Use: Patients explored in greater depth factors that contribute to a relapse including: emotions, thoughts, and situations that trigger substance use. Goal of topic is to assist patients in increased recognition of specific emotions, thoughts, and situations they may experience that increases risk. Patients were able to identify and share their specific emotions, thoughts, and situations with the group. Patients also learned  bridge burning  skill to assist in removing means of acting on substance use.    Patient Session Goals / Objectives:    Verbalized  understanding of the importance of awareness of triggers for substance use    Identified their own individual triggers    [place] effective coping skills in response to triggers for substance use including  bridge burning  skill      Patient Participation / Response:  Fully participated with the group by sharing personal reflections / insights and openly received / provided feedback with other participants.    Demonstrated understanding of topics discussed through group discussion and participation, Demonstrated understanding of utilizing relapse prevention skills to manage urges and maintain sobriety and Identified / Expressed personal readiness to utilize relapse prevention skills    Treatment Plan:  Patient has a current master individualized treatment plan.  See Epic treatment plan for more information.    Liliana Olson, [facility]

## 2022-04-15 NOTE — GROUP NOTE
Psychoeducation Group Note    PATIENT'S NAME: Kandy Yañez  MRN:   [id number]  :   1975  ACCT. NUMBER: [id number]  DATE OF SERVICE: 4/15/22  START TIME: 11:00 AM  END TIME: 11:50 AM  FACILITATOR: Christel Cisneros [facility]; Norberto Burnham RN  TOPIC:  Wellness Group: [facility] Adult Dual Diagnosis Day Treatment  TRACK: DDP 1    NUMBER OF PARTICIPANTS: 5    Summary of Group / Topics Discussed:  Foundations of Health: Sleep: Case study/sleep hygiene: Patients explored the connection between sleep and mental illness. Patients learned about how adequate sleep can improve health, productivity, wellness, quality of life, and safety.      Patient Session Goals / Objectives:  ? Demonstrated understanding of sleep hygiene practices and benefits of sleep    Described the connection between sleep disturbances and mental illness    Continued sleep, talking about things that can keep you awake and wake you up.      Patients were given information on the importance of weekend planning and provided a handout to work on. Framed weekend planning as a way to be intentional/mindful of activities, and to cope ahead by having options if urges to use substances arise.                                     Service Modality:  Video Visit     Telemedicine Visit: The patient's condition can be safely assessed and treated via synchronous audio and visual telemedicine encounter.      Reason for Telemedicine Visit: Services only offered telehealth    Originating Site (Patient Location): Patient's home    Distant Site (Provider Location): Provider Remote Setting- Home Office    Consent:  The patient/guardian has verbally consented to: the potential risks and benefits of telemedicine (video visit) versus in person care; bill my insurance or make self-payment for services provided; and responsibility for payment of non-covered services.     Patient would like the video invitation sent by:  My  Chart    Mode of Communication:  Video Conference via Medical Zoom    As the provider I attest to compliance with applicable laws and regulations related to telemedicine.              Patient Participation / Response:  Fully participated with the group by sharing personal reflections / insights and openly received / provided feedback with other participants.    Demonstrated understanding of topics discussed through group discussion and participation and Identified / Expressed personal readiness to practice skills    Treatment Plan:  Patient has an initial individualized treatment plan that was created as part of their diagnostic assessment / admission process.  A master individualized treatment plan is in the process of being developed with the patient and multi-disciplinary care team.    Christel Cisneros, [facility]

## 2022-04-15 NOTE — GROUP NOTE
Process Group Note    PATIENT'S NAME: Kandy Yañez  MRN:   [id number]  :   1975  ACCT. NUMBER: [id number]  DATE OF SERVICE: 4/15/22  START TIME:  9:00 AM  END TIME:  9:50 AM  FACILITATOR: Liliana Olson [facility]  TOPIC:  Process Group    Diagnoses:  Bipolar I Disorder Current or Most Recent Episode Manic, Mild   300.02 (F41.1) Generalized Anxiety Disorder.  4. Other Diagnoses that is relevant to services:   Substance-Related & Addictive Disorders Stimulant Use Disorder: Specify current severity:  Severe  304.20 (F14.20)Amphetamine type substance, and Severe, Cocaine, Specify if: in remission, Tobacco Use Disorder, Specify current severity:  305.1(F17.200) Moderate, per history PTSD.       [facility] Adult Dual Diagnosis Day Treatment  TRACK: 1    NUMBER OF PARTICIPANTS: 5                                      Service Modality:  Video Visit     Telemedicine Visit: The patient's condition can be safely assessed and treated via synchronous audio and visual telemedicine encounter.      Reason for Telemedicine Visit: Services only offered telehealth    Originating Site (Patient Location): Patient's home    Distant Site (Provider Location): Provider Remote Setting- Home Office    Consent:  The patient/guardian has verbally consented to: the potential risks and benefits of telemedicine (video visit) versus in person care; bill my insurance or make self-payment for services provided; and responsibility for payment of non-covered services.     Patient would like the video invitation sent by:  My Chart    Mode of Communication:  Video Conference via Medical Zoom    As the provider I attest to compliance with applicable laws and regulations related to telemedicine.                Data:    Session content: At the start of this group, patients were invited to check in by identifying themselves, describing their current emotional status, and identifying issues to address in this group.   Area(s) of treatment focus  "addressed in this session included Symptom Management, Personal Safety and Abstinence/Relapse Prevention.    Kandy reported feeling \"positive\" today.  Her goals today are to keep working on some paperwork and do her taxes. Patient declined additional process time but contributed to group discussion and provided feedback and support to peers.        Therapeutic Interventions/Treatment Strategies:  Psychotherapist offered support, feedback and validation and reinforced use of skills.    Assessment:    Patient response:   Patient responded to session by accepting feedback, giving feedback and listening    Possible barriers to participation / learning include: and no barriers identified    Health Issues:   None reported       Substance Use Review:   Substance Use: Substance use has decreased    Mental Status/Behavioral Observations  Appearance:   Appropriate   Eye Contact:   Good   Psychomotor Behavior: Normal   Attitude:   Cooperative   Orientation:   All  Speech   Rate / Production: Normal    Volume:  Normal   Mood:    Normal  Affect:    Appropriate   Thought Content:   Clear  Thought Form:  Coherent  Logical     Insight:    Good     Plan:     Safety Plan: No current safety concerns identified.  Recommended that patient call 911 or go to the local ED should there be a change in any of these risk factors.     Barriers to treatment: None identified    Patient Contracts (see media tab):  None    Substance Use: Provided encouragement towards sobriety    Provided support and affirmation for steps taken towards sobriety      Continue or Discharge: Patient will continue in Adult Dual Disorder Program (DDP) as planned. Patient is likely to benefit from learning and using skills as they work toward the goals identified in their treatment plan.      Liliana Olson, [facility]  April 15, 2022    "

## 2022-04-18 ENCOUNTER — HOSPITAL ENCOUNTER (OUTPATIENT)
Dept: BEHAVIORAL HEALTH | Facility: CLINIC | Age: 47
Discharge: HOME | End: 2022-04-18
Attending: PSYCHIATRY & NEUROLOGY
Payer: COMMERCIAL

## 2022-04-18 PROCEDURE — 90853 GROUP PSYCHOTHERAPY: CPT | Mod: GT,95 | Performed by: SOCIAL WORKER

## 2022-04-18 PROCEDURE — 90853 GROUP PSYCHOTHERAPY: CPT | Mod: GT,95 | Performed by: COUNSELOR

## 2022-04-18 NOTE — GROUP NOTE
Psychotherapy Group Note    PATIENT'S NAME: Kandy Yañez  MRN:   [id number]  :   1975  ACCT. NUMBER: [id number]  DATE OF SERVICE: 22  START TIME: 11:00 AM  END TIME: 11:50 AM  FACILITATOR: SUSHANT Pressley, DK  TOPIC: MH EBP Group: Mindfulness  [facility] Adult Dual Diagnosis Day Treatment  TRACK: DDP 1    NUMBER OF PARTICIPANTS: 4    Summary of Group / Topics Discussed:  Mindfulness: What is Mindfulness: Patients received an overview on what mindfulness is and how mindfulness can benefit general health, mental health symptoms, and stressors. The history of mindfulness, its application to mental health therapies, and key concepts were also discussed. Patients discussed current awareness, knowledge, and practice of mindfulness skills. Patients also discussed barriers to mindfulness practice.     Patient Session Goals / Objectives:    Demonstrated understanding of key concepts and application to daily life    Identified when/how to use mindfulness     Resolved barriers to practice    Identified plan to use mindfulness in daily life                                        Service Modality:  Video Visit     Telemedicine Visit: The patient's condition can be safely assessed and treated via synchronous audio and visual telemedicine encounter.      Reason for Telemedicine Visit: Services only offered telehealth    Originating Site (Patient Location): Patient's home    Distant Site (Provider Location): Provider Remote Setting- Home Office    Consent:  The patient/guardian has verbally consented to: the potential risks and benefits of telemedicine (video visit) versus in person care; bill my insurance or make self-payment for services provided; and responsibility for payment of non-covered services.     Patient would like the video invitation sent by:  My Chart    Mode of Communication:  Video Conference via Medical Zoom    As the provider I attest to compliance with applicable laws and  regulations related to telemedicine.            Patient Participation / Response:  Fully participated with the group by sharing personal reflections / insights and openly received / provided feedback with other participants.    Demonstrated understanding of topics discussed through group discussion and participation and Identified / Expressed personal readiness to practice mindfulness skills    Treatment Plan:  Patient has an initial individualized treatment plan that was created as part of their diagnostic assessment / admission process.  A master individualized treatment plan is in the process of being developed with the patient and multi-disciplinary care team.    Christel Cisneros, KINGSLEYSW

## 2022-04-18 NOTE — GROUP NOTE
Psychoeducation Group Note    PATIENT'S NAME: Kandy Yañez  MRN:   [id number]  :   1975  [id number]  DATE OF SERVICE: 22  START TIME: 10:00 AM  END TIME: 10:50 AM  FACILITATOR: Christel Cisneros, DK; Virginia Lino OTR/L  TOPIC: MH Life Skills Group: Sensory Approaches in Mental Health  [facility] Adult Dual Diagnosis Day Treatment  TRACK: DDP 1    NUMBER OF PARTICIPANTS: 6    Summary of Group / Topics Discussed:  Sensory Approaches in Mental Health:  Self Regulation Through Sensory Modulation:  Patients were provided with education on Autonomic Nervous System activation and taught skills that can be used immediately to help them calm down and regain self control.  Patients were taught how to recognize specific signs and symptoms of their individualized state of arousal and how to make changes when needed.  Patients explored body based skills (bottom up processing skills) and techniques to help manage symptoms and change level of arousal when needed to be in control and comfortable so they are able to function in different environments.       Patient Session Goals / Objectives:    Identified specific and individualized neurosensory skills to help when distressed and for crisis management    Identified signs and symptoms of current level of arousal and ways to make changes in level of arousal when needed    Established a plan for practice of these skills in their own environments                                      Service Modality:  Video Visit     Telemedicine Visit: The patient's condition can be safely assessed and treated via synchronous audio and visual telemedicine encounter.      Reason for Telemedicine Visit: Services only offered telehealth    Originating Site (Patient Location): Patient's home    Distant Site (Provider Location): Provider Remote Setting- Home Office    Consent:  The patient/guardian has verbally consented to: the potential risks and benefits of  telemedicine (video visit) versus in person care; bill my insurance or make self-payment for services provided; and responsibility for payment of non-covered services.     Patient would like the video invitation sent by:  My Chart    Mode of Communication:  Video Conference via Medical Zoom    As the provider I attest to compliance with applicable laws and regulations related to telemedicine.            Patient Participation / Response:  Fully participated with the group by sharing personal reflections / insights and openly received / provided feedback with other participants.    Verbalized understanding of content    Treatment Plan:  Patient has an initial individualized treatment plan that was created as part of their diagnostic assessment / admission process.  A master individualized treatment plan is in the process of being developed with the patient and multi-disciplinary care team.    KINGSLEY PakSW

## 2022-04-18 NOTE — GROUP NOTE
Process Group Note    PATIENT'S NAME: Kandy Yañez  MRN:   [id number]  :   1975  ACCT. NUMBER: [id number]  DATE OF SERVICE: 22  START TIME:  9:00 AM  END TIME:  9:50 AM  FACILITATOR: Liliana Olson [facility]  TOPIC:  Process Group    Diagnoses:  Bipolar I Disorder Current or Most Recent Episode Manic, Mild   300.02 (F41.1) Generalized Anxiety Disorder.  4. Other Diagnoses that is relevant to services:   Substance-Related & Addictive Disorders Stimulant Use Disorder: Specify current severity:  Severe  304.20 (F14.20)Amphetamine type substance, and Severe, Cocaine, Specify if: in remission, Tobacco Use Disorder, Specify current severity:  305.1(F17.200) Moderate, per history PTSD.       [facility] Adult Dual Diagnosis Day Treatment  TRACK: 1    NUMBER OF PARTICIPANTS: 5                                      Service Modality:  Video Visit     Telemedicine Visit: The patient's condition can be safely assessed and treated via synchronous audio and visual telemedicine encounter.      Reason for Telemedicine Visit: Services only offered telehealth    Originating Site (Patient Location): Patient's home    Distant Site (Provider Location): Provider Remote Setting- Home Office    Consent:  The patient/guardian has verbally consented to: the potential risks and benefits of telemedicine (video visit) versus in person care; bill my insurance or make self-payment for services provided; and responsibility for payment of non-covered services.     Patient would like the video invitation sent by:  My Chart    Mode of Communication:  Video Conference via Medical Zoom    As the provider I attest to compliance with applicable laws and regulations related to telemedicine.                Data:    Session content: At the start of this group, patients were invited to check in by identifying themselves, describing their current emotional status, and identifying issues to address in this group.   Area(s) of treatment focus  "addressed in this session included Symptom Management, Personal Safety and Abstinence/Relapse Prevention.    Kandy reported feeling \"apprehensive\" today.  Her goal for the day is to take frequent breaks while working on her tasks today.  She is also planning to quit smoking today.  Patient declined additional process time but contributed to group discussion and provided feedback and support to peers.      Therapeutic Interventions/Treatment Strategies:  Psychotherapist offered support, feedback and validation and reinforced use of skills.    Assessment:    Patient response:   Patient responded to session by accepting feedback, giving feedback and listening    Possible barriers to participation / learning include: and no barriers identified    Health Issues:   None reported       Substance Use Review:   Substance Use: Substance use has decreased    Mental Status/Behavioral Observations  Appearance:   Appropriate   Eye Contact:   Good   Psychomotor Behavior: Normal   Attitude:   Cooperative   Orientation:   All  Speech   Rate / Production: Talkative   Volume:  Normal   Mood:    Normal  Affect:    Appropriate   Thought Content:   Clear  Thought Form:  Coherent  Logical     Insight:    Good     Plan:     Safety Plan: No current safety concerns identified.  Recommended that patient call 911 or go to the local ED should there be a change in any of these risk factors.     Barriers to treatment: None identified    Patient Contracts (see media tab):  None    Substance Use: Provided encouragement towards sobriety    Provided support and affirmation for steps taken towards sobriety      Continue or Discharge: Patient will continue in Adult Dual Disorder Program (DDP) as planned. Patient is likely to benefit from learning and using skills as they work toward the goals identified in their treatment plan.      Liliana Olson, [facility]  April 18, 2022    "

## 2022-04-19 ENCOUNTER — HOSPITAL ENCOUNTER (OUTPATIENT)
Dept: BEHAVIORAL HEALTH | Facility: CLINIC | Age: 47
Discharge: HOME | End: 2022-04-19
Attending: PSYCHIATRY & NEUROLOGY
Payer: COMMERCIAL

## 2022-04-19 PROCEDURE — 90853 GROUP PSYCHOTHERAPY: CPT | Mod: GT,95 | Performed by: COUNSELOR

## 2022-04-19 PROCEDURE — 90853 GROUP PSYCHOTHERAPY: CPT | Mod: GT,95 | Performed by: SOCIAL WORKER

## 2022-04-19 NOTE — GROUP NOTE
Psychotherapy Group Note    PATIENT'S NAME: Kandy Yañez  MRN:   [id number]  :   1975  ACCT. NUMBER: [id number]  DATE OF SERVICE: 22  START TIME: 10:00 AM  END TIME: 10:50 AM  FACILITATOR: Liliana Olson LPCC  TOPIC:  EBP Group: [facility] Adult Dual Diagnosis Day Treatment  TRACK: 1    NUMBER OF PARTICIPANTS: 5                                      Service Modality:  Video Visit     Telemedicine Visit: The patient's condition can be safely assessed and treated via synchronous audio and visual telemedicine encounter.      Reason for Telemedicine Visit: Services only offered telehealth    Originating Site (Patient Location): Patient's home    Distant Site (Provider Location): Provider Remote Setting- Home Office    Consent:  The patient/guardian has verbally consented to: the potential risks and benefits of telemedicine (video visit) versus in person care; bill my insurance or make self-payment for services provided; and responsibility for payment of non-covered services.     Patient would like the video invitation sent by:  My Chart    Mode of Communication:  Video Conference via Medical Zoom    As the provider I attest to compliance with applicable laws and regulations related to telemedicine.          Summary of Group / Topics Discussed:  Mindfulness: What is Mindfulness: Patients received an overview on what mindfulness is and how mindfulness can benefit general health, mental health symptoms, and stressors. The history of mindfulness, its application to mental health therapies, and key concepts were also discussed. Patients discussed current awareness, knowledge, and practice of mindfulness skills. Patients also discussed barriers to mindfulness practice.     Patient Session Goals / Objectives:    Demonstrated understanding of key concepts and application to daily life    Identified when/how to use mindfulness     Resolved barriers to practice    Identified plan to use  mindfulness in daily life      Patient Participation / Response:  Fully participated with the group by sharing personal reflections / insights and openly received / provided feedback with other participants.    Demonstrated understanding of topics discussed through group discussion and participation, Demonstrated understanding of mindfulness skills and benefits of practice and Identified / Expressed personal readiness to practice mindfulness skills    Treatment Plan:  Patient has a current master individualized treatment plan.  See Epic treatment plan for more information.    Liliana Olson, [facility]

## 2022-04-19 NOTE — GROUP NOTE
Process Group Note    PATIENT'S NAME: Kandy Yañez  MRN:   [id number]  :   1975  ACCT. NUMBER: [id number]  DATE OF SERVICE: 22  START TIME:  9:00 AM  END TIME:  9:50 AM  FACILITATOR: Liliana Olson [facility]  TOPIC:  Process Group    Diagnoses:  Bipolar I Disorder Current or Most Recent Episode Manic, Mild   300.02 (F41.1) Generalized Anxiety Disorder.  4. Other Diagnoses that is relevant to services:   Substance-Related & Addictive Disorders Stimulant Use Disorder: Specify current severity:  Severe  304.20 (F14.20)Amphetamine type substance, and Severe, Cocaine, Specify if: in remission, Tobacco Use Disorder, Specify current severity:  305.1(F17.200) Moderate, per history PTSD.       [facility] Adult Dual Diagnosis Day Treatment  TRACK: 1    NUMBER OF PARTICIPANTS: 7                                      Service Modality:  Video Visit     Telemedicine Visit: The patient's condition can be safely assessed and treated via synchronous audio and visual telemedicine encounter.      Reason for Telemedicine Visit: Services only offered telehealth    Originating Site (Patient Location): Patient's home    Distant Site (Provider Location): Provider Remote Setting- Home Office    Consent:  The patient/guardian has verbally consented to: the potential risks and benefits of telemedicine (video visit) versus in person care; bill my insurance or make self-payment for services provided; and responsibility for payment of non-covered services.     Patient would like the video invitation sent by:  My Chart    Mode of Communication:  Video Conference via Medical Zoom    As the provider I attest to compliance with applicable laws and regulations related to telemedicine.                Data:    Session content: At the start of this group, patients were invited to check in by identifying themselves, describing their current emotional status, and identifying issues to address in this group.   Area(s) of treatment focus  addressed in this session included Symptom Management, Personal Safety and Abstinence/Relapse Prevention.    Kandy reported feeling good today and is still working on not smoking. Her goal today is to schedule a doctor appointment to get her labs checked.  Patient declined additional process time but contributed to group discussion and provided feedback and support to peers.        Therapeutic Interventions/Treatment Strategies:  Psychotherapist offered support, feedback and validation and reinforced use of skills.    Assessment:    Patient response:   Patient responded to session by accepting feedback, giving feedback and listening    Possible barriers to participation / learning include: and no barriers identified    Health Issues:   None reported       Substance Use Review:   Substance Use: Substance use has decreased    Mental Status/Behavioral Observations  Appearance:   Appropriate   Eye Contact:   Good   Psychomotor Behavior: Normal   Attitude:   Cooperative   Orientation:   All  Speech   Rate / Production: Normal    Volume:  Normal   Mood:    Normal  Affect:    Appropriate   Thought Content:   Clear  Thought Form:  Coherent  Logical     Insight:    Good     Plan:     Safety Plan: No current safety concerns identified.  Recommended that patient call 911 or go to the local ED should there be a change in any of these risk factors.     Barriers to treatment: None identified    Patient Contracts (see media tab):  None    Substance Use: Provided encouragement towards sobriety    Provided support and affirmation for steps taken towards sobriety      Continue or Discharge: Patient will continue in Adult Dual Disorder Program (DDP) as planned. Patient is likely to benefit from learning and using skills as they work toward the goals identified in their treatment plan.      Liliana Olson, [facility]  April 19, 2022

## 2022-04-19 NOTE — GROUP NOTE
Psychoeducation Group Note    PATIENT'S NAME: Kandy Yañez  MRN:   [id number]  :   1975  ACCT. NUMBER: [id number]  DATE OF SERVICE: 22  START TIME: 11:00 AM  END TIME: 11:50 AM  [facility]ACILIT[facility]: [facility]; Norberto Burnham RN  TOPIC:  Wellness Group: [facility] Adult Dual Diagnosis Day Treatment  TRACK: DDP 1    NUMBER OF PARTICIPANTS: 6    Summary of Group / Topics Discussed:  Foundations of Health: Nutrition: MICD nutrition: Patients were provided education on the role of nutrition in the body and all of the functions that nutrition influences including energy, body structure and bodily function as overarching categories. Select macronutrients and micronutrients and their important roles and functions were also reviewed. Chemical and substance use disrupt how we eat, how our organs behave, and what our body does with the food that we do eat.      Patient Session Goals / Objectives:  ? Identified hunger as a factor that can increase risk for chemical/substance relapse  ? Described the role of macronutrients and micronutrients in the body  ? Listed strategies for promoting balanced nutrition to promote wellness and recovery                                      Service Modality:  Video Visit     Telemedicine Visit: The patient's condition can be safely assessed and treated via synchronous audio and visual telemedicine encounter.      Reason for Telemedicine Visit: Services only offered telehealth    Originating Site (Patient Location): Patient's home    Distant Site (Provider Location): Provider Remote Setting- Home Office    Consent:  The patient/guardian has verbally consented to: the potential risks and benefits of telemedicine (video visit) versus in person care; bill my insurance or make self-payment for services provided; and responsibility for payment of non-covered services.     Patient would like the video invitation sent by:  My  Chart    Mode of Communication:  Video Conference via Medical Zoom    As the provider I attest to compliance with applicable laws and regulations related to telemedicine.            Patient Participation / Response:  Fully participated with the group by sharing personal reflections / insights and openly received / provided feedback with other participants.    Demonstrated understanding of topics discussed through group discussion and participation    Treatment Plan:  Patient has an initial individualized treatment plan that was created as part of their diagnostic assessment / admission process.  A master individualized treatment plan is in the process of being developed with the patient and multi-disciplinary care team.    Christel Cisneros, KINGSLEYSW

## 2022-04-21 ENCOUNTER — HOSPITAL ENCOUNTER (OUTPATIENT)
Dept: BEHAVIORAL HEALTH | Facility: CLINIC | Age: 47
Discharge: HOME | End: 2022-04-21
Attending: PSYCHIATRY & NEUROLOGY
Payer: COMMERCIAL

## 2022-04-21 PROCEDURE — 90853 GROUP PSYCHOTHERAPY: CPT | Mod: GT,95 | Performed by: COUNSELOR

## 2022-04-21 PROCEDURE — 90853 GROUP PSYCHOTHERAPY: CPT | Mod: GT,95 | Performed by: SOCIAL WORKER

## 2022-04-21 NOTE — GROUP NOTE
Process Group Note    PATIENT'S NAME: Kandy Yañez  MRN:   [id number]  :   1975  ACCT. NUMBER: [id number]  DATE OF SERVICE: 22  START TIME:  9:00 AM  END TIME:  9:50 AM  FACILITATOR: Liliana Olson [facility]  TOPIC:  Process Group    Diagnoses:  Bipolar I Disorder Current or Most Recent Episode Manic, Mild   300.02 (F41.1) Generalized Anxiety Disorder.  4. Other Diagnoses that is relevant to services:   Substance-Related & Addictive Disorders Stimulant Use Disorder: Specify current severity:  Severe  304.20 (F14.20)Amphetamine type substance, and Severe, Cocaine, Specify if: in remission, Tobacco Use Disorder, Specify current severity:  305.1(F17.200) Moderate, per history PTSD.       [facility] Adult Dual Diagnosis Day Treatment  TRACK: 1    NUMBER OF PARTICIPANTS: 6                                      Service Modality:  Video Visit     Telemedicine Visit: The patient's condition can be safely assessed and treated via synchronous audio and visual telemedicine encounter.      Reason for Telemedicine Visit: Services only offered telehealth    Originating Site (Patient Location): Patient's home    Distant Site (Provider Location): Provider Remote Setting- Home Office    Consent:  The patient/guardian has verbally consented to: the potential risks and benefits of telemedicine (video visit) versus in person care; bill my insurance or make self-payment for services provided; and responsibility for payment of non-covered services.     Patient would like the video invitation sent by:  My Chart    Mode of Communication:  Video Conference via Medical Zoom    As the provider I attest to compliance with applicable laws and regulations related to telemedicine.                Data:    Session content: At the start of this group, patients were invited to check in by identifying themselves, describing their current emotional status, and identifying issues to address in this group.   Area(s) of treatment focus  "addressed in this session included Symptom Management, Personal Safety and Abstinence/Relapse Prevention.    Kandy reported feeling \"positive\" today.  Her goal for the day is to do one of her resentments on her 4th step.  She has been doing a lot of work and feeling really positive about her sobriety and future. Patient declined additional process time but contributed to group discussion and provided feedback and support to peers.      Therapeutic Interventions/Treatment Strategies:  Psychotherapist offered support, feedback and validation and reinforced use of skills.    Assessment:    Patient response:   Patient responded to session by accepting feedback, giving feedback and listening    Possible barriers to participation / learning include: and no barriers identified    Health Issues:   None reported       Substance Use Review:   Substance Use: Substance use has decreased    Mental Status/Behavioral Observations  Appearance:   Appropriate   Eye Contact:   Good   Psychomotor Behavior: Normal   Attitude:   Cooperative   Orientation:   All  Speech   Rate / Production: Normal    Volume:  Normal   Mood:    Elevated  Normal  Affect:    Appropriate   Thought Content:   Clear  Thought Form:  Coherent  Logical     Insight:    Good     Plan:     Safety Plan: No current safety concerns identified.  Recommended that patient call 911 or go to the local ED should there be a change in any of these risk factors.     Barriers to treatment: None identified    Patient Contracts (see media tab):  None    Substance Use: Provided encouragement towards sobriety    Provided support and affirmation for steps taken towards sobriety      Continue or Discharge: Patient will continue in Adult Dual Disorder Program (DDP) as planned. Patient is likely to benefit from learning and using skills as they work toward the goals identified in their treatment plan.      Liliana Olson, [facility]  April 21, 2022    "

## 2022-04-21 NOTE — GROUP NOTE
Psychoeducation Group Note    PATIENT'S NAME: Kandy Yañez  MRN:   [id number]  :   1975  ACCT. NUMBER: [id number]  DATE OF SERVICE: 22  START TIME: 11:00 AM  END TIME: 11:50 AM  FACILITATOR: [facility]; Norberto Burnham RN  TOPIC:  Wellness Group: [facility] Adult Dual Diagnosis Day Treatment  TRACK: DDP 1    NUMBER OF PARTICIPANTS: 4    Summary of Group / Topics Discussed:  Foundations of Health: Nutrition: Super Nutrients & Micronutrients: Super Nutrients are Foods that have high nutritional yield. Micronutrients are essential elements found in food or taken through supplements that are necessary for normal physiological functioning. This group was designed to complement the macronutrients group and build upon previous education. The changes that food makes on the brain (how the brain uses sugar) and nutrition as it relates to mental health was also discussed.       Patient Session Goals / Objectives:  ? Identified the health enhancing benefits to good nutrition focusing on prebiotics and probiotics  ? Verbalized ways in which the food we eat affects the brain  ? Explained the role of micronutrients in the body, how much we need, and how to get it      Patient Participation / Response:  Fully participated with the group by sharing personal reflections / insights and openly received / provided feedback with other participants.    Demonstrated understanding of topics discussed through group discussion and participation and Identified / Expressed personal readiness to practice skills    Treatment Plan:  Patient has an initial individualized treatment plan that was created as part of their diagnostic assessment / admission process.  A master individualized treatment plan is in the process of being developed with the patient and multi-disciplinary care team.     Service Modality:  Video Visit     Telemedicine Visit: The patient's condition can be  safely assessed and treated via synchronous audio and visual telemedicine encounter.      Reason for Telemedicine Visit: Services only offered telehealth    Originating Site (Patient Location): Patient's home    Distant Site (Provider Location): Provider Remote Setting- Home Office    Consent:  The patient/guardian has verbally consented to: the potential risks and benefits of telemedicine (video visit) versus in person care; bill my insurance or make self-payment for services provided; and responsibility for payment of non-covered services.     Patient would like the video invitation sent by:  My Chart    Mode of Communication:  Video Conference via Medical Zoom    As the provider I attest to compliance with applicable laws and regulations related to telemedicine.       Christel Cisneros, LICSW

## 2022-04-21 NOTE — GROUP NOTE
Psychoeducation Group Note    PATIENT'S NAME: Kandy Yañez  MRN:   [id number]  :   1975  ACCT. NUMBER: [id number]  DATE OF SERVICE: 22  START TIME: 10:00 AM  END TIME: 10:50 AM  FACILITATOR: Christel Cisneros, DK; Virginia Lino OTR/L  TOPIC: MH Life Skills Group: Lifestyle Balance and Structure  [facility] Adult Dual Diagnosis Day Treatment  TRACK: DDP 1    NUMBER OF PARTICIPANTS: 4      Summary of Group / Topics Discussed:  Lifestyle Balance and Strucure:  Benefits of Leisure on Mental Health: Patients explored and learned about the benefits and possibilities of leisure activity to create lifestyle balance that supports their mental and physical wellbeing.  Patients were assisted to identify individualized leisure values and interests, recognized the benefits of leisure activity on mental health, and problem solved barriers to leisure engagement and strategies to overcome.  Patient engaged in an experiential leisure activity to gain self-awareness and build milieu social aspects and reflected on the impact the experiential activity had on their mood.       Patient Session Goals / Objectives:    Increased awareness of the importance of engagement in leisure activities to support lifestyle balance and perceived quality of life    Identified strategies to recognize and challenge barriers to leisure participation     Facilitated exploration of meaningful leisure interests and values    Practiced and reflected on how to generalize taught skills to their everyday life such as gratitude, awe, and mindfulness which can improve mood and overall feelings of wellbeing.                                       Service Modality:  Video Visit     Telemedicine Visit: The patient's condition can be safely assessed and treated via synchronous audio and visual telemedicine encounter.      Reason for Telemedicine Visit: Services only offered telehealth    Originating Site (Patient Location): Patient's  home    Distant Site (Provider Location): Provider Remote Setting- Home Office    Consent:  The patient/guardian has verbally consented to: the potential risks and benefits of telemedicine (video visit) versus in person care; bill my insurance or make self-payment for services provided; and responsibility for payment of non-covered services.     Patient would like the video invitation sent by:  My Chart    Mode of Communication:  Video Conference via Medical Zoom    As the provider I attest to compliance with applicable laws and regulations related to telemedicine.            Patient Participation / Response:  Fully participated with the group by sharing personal reflections / insights and openly received / provided feedback with other participants.    Verbalized understanding of content    Treatment Plan:  Patient has an initial individualized treatment plan that was created as part of their diagnostic assessment / admission process.  A master individualized treatment plan is in the process of being developed with the patient and multi-disciplinary care team.    KINGSLEY PakSW

## 2022-04-22 ENCOUNTER — HOSPITAL ENCOUNTER (OUTPATIENT)
Dept: BEHAVIORAL HEALTH | Facility: CLINIC | Age: 47
Discharge: HOME | End: 2022-04-22
Attending: PSYCHIATRY & NEUROLOGY
Payer: COMMERCIAL

## 2022-04-22 PROCEDURE — 90853 GROUP PSYCHOTHERAPY: CPT | Mod: GT,95 | Performed by: COUNSELOR

## 2022-04-22 PROCEDURE — 90853 GROUP PSYCHOTHERAPY: CPT | Mod: GT,95 | Performed by: SOCIAL WORKER

## 2022-04-22 NOTE — GROUP NOTE
Psychotherapy Group Note    PATIENT'S NAME: Kandy Yañez  MRN:   [id number]  :   1975  [id number]  DATE OF SERVICE: 22  START TIME: 10:00 AM  END TIME: 10:50 AM  FACILITATOR: Liliana Olson LPCC  TOPIC:  EBP Group: DDP Relapse Prevention  [facility] Adult Dual Diagnosis Day Treatment  TRACK: 1    NUMBER OF PARTICIPANTS: 3                                      Service Modality:  Video Visit     Telemedicine Visit: The patient's condition can be safely assessed and treated via synchronous audio and visual telemedicine encounter.      Reason for Telemedicine Visit: Services only offered telehealth    Originating Site (Patient Location): Patient's home    Distant Site (Provider Location): Provider Remote Setting- Home Office    Consent:  The patient/guardian has verbally consented to: the potential risks and benefits of telemedicine (video visit) versus in person care; bill my insurance or make self-payment for services provided; and responsibility for payment of non-covered services.     Patient would like the video invitation sent by:  My Chart    Mode of Communication:  Video Conference via Medical Zoom    As the provider I attest to compliance with applicable laws and regulations related to telemedicine.          Summary of Group / Topics Discussed:  DDP Relapse Prevention: Stages of Change: Patients explored the process and types of change in relation to substance use, including but not limited to: theories of change, steps to making change, methods of changing behavior, and potential barriers to implementing change. Patients discussed their current and past experiences with managing change in relation to substance use and what stage of change they currently identify with. Patients identified what changes may benefit their daily lives and how they can work towards implementing change.    Patient Session Goals / Objectives:    Gained understanding of the change  process    Identified positive and negative behavioral patterns    Made plans to track and implement changes and shared experiences in group    Identified personal barriers to change      Patient Participation / Response:  Fully participated with the group by sharing personal reflections / insights and openly received / provided feedback with other participants.    Demonstrated understanding of topics discussed through group discussion and participation, Demonstrated understanding of utilizing relapse prevention skills to manage urges and maintain sobriety and Identified / Expressed personal readiness to utilize relapse prevention skills    Treatment Plan:  Patient has a current master individualized treatment plan.  See Epic treatment plan for more information.    Liliana Olson, [facility]

## 2022-04-22 NOTE — GROUP NOTE
Psychoeducation Group Note    PATIENT'S NAME: Kandy Yañez  MRN:   [id number]  :   1975  ACCT. NUMBER: [id number]  DATE OF SERVICE: 22  START TIME: 11:00 AM  END TIME: 11:50 AM  FACILITATOR: Christel Cisneros, DK; Virginia Lino OTR/L  TOPIC: MH Life Skills Group: Lifestyle Balance and Structure  [facility] Adult Dual Diagnosis Day Treatment  TRACK: DDP 1    NUMBER OF PARTICIPANTS: 4    Summary of Group / Topics Discussed:  Lifestyle Balance and Structure:  Weekly Reflection: Patients were introduced to the process and benefits of reflection on follow through with meaningful personal, health, recovery and treatment goals.  Patients also reflected on barriers to success and insights gained throughout the week. Patients also reflected on experiences of leaning into discomfort, learning, accomplishments, delight, and gratitude in their lives to cultivate sense of meaning and satisfaction.      Patient Session Goals / Objectives:    Facilitated the creation of a vision for recovery and wellbeing    Cultivate a sense of gratitude and wonder to support mental health     Identified and problem solved barriers to achieving goals     Identified plan to support follow through on goals and reflection on progress made                                      Service Modality:  Video Visit     Telemedicine Visit: The patient's condition can be safely assessed and treated via synchronous audio and visual telemedicine encounter.      Reason for Telemedicine Visit: Services only offered telehealth    Originating Site (Patient Location): Patient's home    Distant Site (Provider Location): Provider Remote Setting- Home Office    Consent:  The patient/guardian has verbally consented to: the potential risks and benefits of telemedicine (video visit) versus in person care; bill my insurance or make self-payment for services provided; and responsibility for payment of non-covered services.     Patient would  like the video invitation sent by:  My Chart    Mode of Communication:  Video Conference via Medical Zoom    As the provider I attest to compliance with applicable laws and regulations related to telemedicine.            Patient Participation / Response:  Fully participated with the group by sharing personal reflections / insights and openly received / provided feedback with other participants.    Verbalized understanding of content    Treatment Plan:  Patient has a current master individualized treatment plan.  See Epic treatment plan for more information.    Christel Cisneros, LICSW

## 2022-04-22 NOTE — GROUP NOTE
Process Group Note    PATIENT'S NAME: Kandy Yañez  MRN:   [id number]  :   1975  ACCT. NUMBER: [id number]  DATE OF SERVICE: 22  START TIME:  9:00 AM  END TIME:  9:50 AM  FACILITATOR: Liliana Olson [facility]  TOPIC:  Process Group    Diagnoses:  Bipolar I Disorder Current or Most Recent Episode Manic, Mild   300.02 (F41.1) Generalized Anxiety Disorder.  4. Other Diagnoses that is relevant to services:   Substance-Related & Addictive Disorders Stimulant Use Disorder: Specify current severity:  Severe  304.20 (F14.20)Amphetamine type substance, and Severe, Cocaine, Specify if: in remission, Tobacco Use Disorder, Specify current severity:  305.1(F17.200) Moderate, per history PTSD.       [facility] Adult Dual Diagnosis Day Treatment  TRACK: 1    NUMBER OF PARTICIPANTS: 6                                      Service Modality:  Video Visit     Telemedicine Visit: The patient's condition can be safely assessed and treated via synchronous audio and visual telemedicine encounter.      Reason for Telemedicine Visit: Services only offered telehealth    Originating Site (Patient Location): Patient's home    Distant Site (Provider Location): Provider Remote Setting- Home Office    Consent:  The patient/guardian has verbally consented to: the potential risks and benefits of telemedicine (video visit) versus in person care; bill my insurance or make self-payment for services provided; and responsibility for payment of non-covered services.     Patient would like the video invitation sent by:  My Chart    Mode of Communication:  Video Conference via Medical Zoom    As the provider I attest to compliance with applicable laws and regulations related to telemedicine.                Data:    Session content: At the start of this group, patients were invited to check in by identifying themselves, describing their current emotional status, and identifying issues to address in this group.   Area(s) of treatment focus  "addressed in this session included Symptom Management, Personal Safety and Abstinence/Relapse Prevention.    Kandy reported feeling \"good\" today.  Her goal for the day is to work on her 4th step.  Patient declined additional process time but contributed to group discussion and provided feedback and support to peers.      Therapeutic Interventions/Treatment Strategies:  Psychotherapist offered support, feedback and validation and reinforced use of skills.    Assessment:    Patient response:   Patient responded to session by accepting feedback, giving feedback and listening    Possible barriers to participation / learning include: and no barriers identified    Health Issues:   None reported       Substance Use Review:   Substance Use: Substance use has decreased    Mental Status/Behavioral Observations  Appearance:   Appropriate   Eye Contact:   Good   Psychomotor Behavior: Normal   Attitude:   Cooperative   Orientation:   All  Speech   Rate / Production: Normal    Volume:  Normal   Mood:    Elevated   Affect:    Appropriate   Thought Content:   Clear  Thought Form:  Coherent  Logical     Insight:    Good     Plan:     Safety Plan: No current safety concerns identified.  Recommended that patient call 911 or go to the local ED should there be a change in any of these risk factors.     Barriers to treatment: None identified    Patient Contracts (see media tab):  None    Substance Use: Provided encouragement towards sobriety    Provided support and affirmation for steps taken towards sobriety      Continue or Discharge: Patient will continue in Adult Dual Disorder Program (DDP) as planned. Patient is likely to benefit from learning and using skills as they work toward the goals identified in their treatment plan.      Liliana Olson, [facility]  April 22, 2022    "

## 2022-04-26 ENCOUNTER — HOSPITAL ENCOUNTER (OUTPATIENT)
Dept: BEHAVIORAL HEALTH | Facility: CLINIC | Age: 47
Discharge: HOME | End: 2022-04-26
Attending: PSYCHIATRY & NEUROLOGY
Payer: COMMERCIAL

## 2022-04-26 PROCEDURE — 90853 GROUP PSYCHOTHERAPY: CPT | Mod: GT,95 | Performed by: COUNSELOR

## 2022-04-26 PROCEDURE — 90853 GROUP PSYCHOTHERAPY: CPT | Mod: GT,95 | Performed by: SOCIAL WORKER

## 2022-04-26 NOTE — GROUP NOTE
Psychoeducation Group Note    PATIENT'S NAME: Kandy Yañez  MRN:   [id number]  :   1975  ACCT. NUMBER: [id number]  DATE OF SERVICE: 22  START TIME: 11:00 AM  END TIME: 11:50 AM  FACILITATOR: Christel Cisneros LICSW  TOPIC: MH Wellness Group: Health Maintenance  [facility] Adult Dual Diagnosis Day Treatment  TRACK: DDP 1    NUMBER OF PARTICIPANTS: 5    Summary of Group / Topics Discussed:  Health Maintenance: Eight Dimensions of Wellness: The concept of holistic health through the model of eight dimensions was introduced. Group members participated in identifying behaviors and activities in each of the dimensions of wellness.  The importance of each dimension was reinforced and the concept of balance in life as it relates to wellness was explored.      Patient Session Goals / Objectives:    Verbalized understanding of balance in wellness and how it relates to their life    Identified and explained the eight dimensions of wellness    Categorized activities and wellness needs into corresponding dimensions appropriately during exercise                                        Service Modality:  Video Visit     Telemedicine Visit: The patient's condition can be safely assessed and treated via synchronous audio and visual telemedicine encounter.      Reason for Telemedicine Visit: Services only offered telehealth    Originating Site (Patient Location): Patient's home    Distant Site (Provider Location): Provider Remote Setting- Home Office    Consent:  The patient/guardian has verbally consented to: the potential risks and benefits of telemedicine (video visit) versus in person care; bill my insurance or make self-payment for services provided; and responsibility for payment of non-covered services.     Patient would like the video invitation sent by:  My Chart    Mode of Communication:  Video Conference via Medical Zoom    As the provider I attest to compliance with applicable laws and  regulations related to telemedicine.            Patient Participation / Response:  Fully participated with the group by sharing personal reflections / insights and openly received / provided feedback with other participants.    Demonstrated understanding of topics discussed through group discussion and participation    Treatment Plan:  Patient has an initial individualized treatment plan that was created as part of their diagnostic assessment / admission process.  A master individualized treatment plan is in the process of being developed with the patient and multi-disciplinary care team.    KINGSLEY PakSW

## 2022-04-26 NOTE — GROUP NOTE
Psychotherapy Group Note    PATIENT'S NAME: Kandy Yañez  MRN:   [id number]  :   1975  [facility] [id number]  DATE OF SERVICE: 22  START TIME: 10:00 AM  END TIME: 10:50 AM  FACILITATOR: Liliana Olson LPCC  TOPIC: MH EBP Group: Coping Skills  [facility] Adult Dual Diagnosis Day Treatment  TRACK: 1    NUMBER OF PARTICIPANTS: 5                                      Service Modality:  Video Visit     Telemedicine Visit: The patient's condition can be safely assessed and treated via synchronous audio and visual telemedicine encounter.      Reason for Telemedicine Visit: Services only offered telehealth    Originating Site (Patient Location): Patient's home    Distant Site (Provider Location): Provider Remote Setting- Home Office    Consent:  The patient/guardian has verbally consented to: the potential risks and benefits of telemedicine (video visit) versus in person care; bill my insurance or make self-payment for services provided; and responsibility for payment of non-covered services.     Patient would like the video invitation sent by:  My Chart    Mode of Communication:  Video Conference via Medical Zoom    As the provider I attest to compliance with applicable laws and regulations related to telemedicine.          Summary of Group / Topics Discussed:  Coping Skills: Grounding: Patients discussed and practiced strategies to increase attachment / presence to the current moment.  Patients identified situations in which using these strategies will help improve emotion regulation sense of calm in the body.  Reviewed the benefits of applying grounding strategies, as well as past / current practices of each member.  Patients identified situations in which using these strategies would reduce stress. They developed the ability to distinguish when these strategies can be useful in their lives for management and stress and psychological well-being.    Patient Session Goals /  Objectives:    Understand the purpose of using grounding strategies to reduce stress.    Verbalize understanding of how and when to apply grounding strategies to reduce distress and increase presence in the moment.    Review patients current grounding practices and discuss a more formal way of practicing and accessing skills.    Practice using various calming strategies (e.g. 5-4-3-2-1; mental and body awareness).    Choose 1-2 grounding strategies to apply during times of distress.        Patient Participation / Response:  Fully participated with the group by sharing personal reflections / insights and openly received / provided feedback with other participants.    Demonstrated understanding of topics discussed through group discussion and participation, Expressed understanding of the relevance / importance of coping skills at distressing times in life and Demonstrated knowledge of when to consider using a variety of coping skills in daily life    Treatment Plan:  Patient has a current master individualized treatment plan.  See Epic treatment plan for more information.    Liliana Olson, [facility]

## 2022-04-26 NOTE — GROUP NOTE
Process Group Note    PATIENT'S NAME: Kandy Yañez  MRN:   [id number]  :   1975  ACCT. NUMBER: [id number]  DATE OF SERVICE: 22  START TIME:  9:00 AM  END TIME:  9:50 AM  FACILITATOR: Liliana Olson [facility]  TOPIC:  Process Group    Diagnoses:  Bipolar I Disorder Current or Most Recent Episode Manic, Mild   300.02 (F41.1) Generalized Anxiety Disorder.  4. Other Diagnoses that is relevant to services:   Substance-Related & Addictive Disorders Stimulant Use Disorder: Specify current severity:  Severe  304.20 (F14.20)Amphetamine type substance, and Severe, Cocaine, Specify if: in remission, Tobacco Use Disorder, Specify current severity:  305.1(F17.200) Moderate, per history PTSD.       [facility] Adult Dual Diagnosis Day Treatment  TRACK: 1    NUMBER OF PARTICIPANTS: 5                                      Service Modality:  Video Visit     Telemedicine Visit: The patient's condition can be safely assessed and treated via synchronous audio and visual telemedicine encounter.      Reason for Telemedicine Visit: Services only offered telehealth    Originating Site (Patient Location): Patient's home    Distant Site (Provider Location): Provider Remote Setting- Home Office    Consent:  The patient/guardian has verbally consented to: the potential risks and benefits of telemedicine (video visit) versus in person care; bill my insurance or make self-payment for services provided; and responsibility for payment of non-covered services.     Patient would like the video invitation sent by:  My Chart    Mode of Communication:  Video Conference via Medical Zoom    As the provider I attest to compliance with applicable laws and regulations related to telemedicine.                Data:    Session content: At the start of this group, patients were invited to check in by identifying themselves, describing their current emotional status, and identifying issues to address in this group.   Area(s) of treatment focus  "addressed in this session included Symptom Management, Personal Safety and Abstinence/Relapse Prevention.    Kandy reported feeling \"guilty\" today.  Her goal is to reach out for support.  She reported that she had sex with her ex over the weekend and feeling very guilty about it because she knows she shouldn't have.  She received a lot of support and encouragement from group peers.    Therapeutic Interventions/Treatment Strategies:  Psychotherapist offered support, feedback and validation and reinforced use of skills. Treatment modalities used include Motivational Interviewing, Cognitive Behavioral Therapy and Dialectical Behavioral Therapy. Interventions include Relationship Skills: Encouraged development and maintenance  of healthy boundaries.    Assessment:    Patient response:   Patient responded to session by accepting feedback, giving feedback and listening    Possible barriers to participation / learning include: and no barriers identified    Health Issues:   None reported       Substance Use Review:   Substance Use: Substance use has decreased    Mental Status/Behavioral Observations  Appearance:   Appropriate   Eye Contact:   Good   Psychomotor Behavior: Normal   Attitude:   Cooperative   Orientation:   All  Speech   Rate / Production: Normal    Volume:  Normal   Mood:    Anxious   Affect:    Appropriate   Thought Content:   Clear  Thought Form:  Coherent  Logical     Insight:    Good     Plan:     Safety Plan: No current safety concerns identified.  Recommended that patient call 911 or go to the local ED should there be a change in any of these risk factors.     Barriers to treatment: None identified    Patient Contracts (see media tab):  None    Substance Use: Provided encouragement towards sobriety    Provided support and affirmation for steps taken towards sobriety      Continue or Discharge: Patient will continue in Adult Dual Disorder Program (DDP) as planned. Patient is likely to benefit from learning " and using skills as they work toward the goals identified in their treatment plan.      Liliana Olson, [facility]  April 26, 2022

## 2022-04-28 ENCOUNTER — HOSPITAL ENCOUNTER (OUTPATIENT)
Dept: BEHAVIORAL HEALTH | Facility: CLINIC | Age: 47
Discharge: HOME | End: 2022-04-28
Attending: PSYCHIATRY & NEUROLOGY
Payer: COMMERCIAL

## 2022-04-28 PROCEDURE — 90853 GROUP PSYCHOTHERAPY: CPT | Mod: GT,95 | Performed by: COUNSELOR

## 2022-04-28 PROCEDURE — G0177 OPPS/PHP; TRAIN & EDUC SERV: HCPCS | Mod: GT,95

## 2022-04-28 NOTE — GROUP NOTE
Psychoeducation Group Note    PATIENT'S NAME: Kandy Yañez  MRN:   [id number]  :   1975  [id number]  DATE OF SERVICE: 22  START TIME: 10:00 AM  END TIME: 10:50 AM  FACILITATOR: Virginia Lino OTR/L  TOPIC: MH Life Skills Group: Resiliency Development  [facility] Adult Dual Diagnosis Day Treatment    TRACK: DDP1    Service Modality:  Video Visit     Telemedicine Visit: The patient's condition can be safely assessed and treated via synchronous audio and visual telemedicine encounter.      Reason for Telemedicine Visit: Services only offered telehealth    Originating Site (Patient Location): Patient's home    Distant Site (Provider Location): Provider Remote Setting- Home Office    Consent:  The patient/guardian has verbally consented to: the potential risks and benefits of telemedicine (video visit) versus in person care; bill my insurance or make self-payment for services provided; and responsibility for payment of non-covered services.     Patient would like the video invitation sent by:  My Chart    Mode of Communication:  Video Conference via Medical Zoom    As the provider I attest to compliance with applicable laws and regulations related to telemedicine.     Summary of Group / Topics Discussed:  Life Skills: Self-Reflection and Inspiration - Patients were provided an opportunity to reflect on their own life experiences and how they have provided lessons to be shared with others. Patients shared unique advice from their own lives. Patients reviewed seeing challenges as opportunities to learn. Patients were given an opportunity to select and share inspiration for their journeys.    Patient Session Goals / Objectives:  ? Cultivate a sense of appreciation of individual journeys  ? Share unique perspectives with peers  ? Practice gathering inspiration to fuel oneself when feeling challenged      Patient Participation / Response:  Fully participated with the group by sharing personal  reflections / insights and openly received / provided feedback with other participants.    Patient presentation: bright, shared thoughts, feelings and idea with group. Pt noted the importance of forgiveness and having integrity.     Treatment Plan:  Patient has a current master individualized treatment plan.  See Epic treatment plan for more information.    Virginia Lino MA, OTR/L

## 2022-04-28 NOTE — GROUP NOTE
Process Group Note    PATIENT'S NAME: Kandy Yañez  MRN:   [id number]  :   1975  ACCT. NUMBER: [id number]  DATE OF SERVICE: 22  START TIME:  9:00 AM  END TIME:  9:50 AM  FACILITATOR: Liliana Olson [facility]  TOPIC:  Process Group    Diagnoses:  Bipolar I Disorder Current or Most Recent Episode Manic, Mild   300.02 (F41.1) Generalized Anxiety Disorder.  4. Other Diagnoses that is relevant to services:   Substance-Related & Addictive Disorders Stimulant Use Disorder: Specify current severity:  Severe  304.20 (F14.20)Amphetamine type substance, and Severe, Cocaine, Specify if: in remission, Tobacco Use Disorder, Specify current severity:  305.1(F17.200) Moderate, per history PTSD.       [facility] Adult Dual Diagnosis Day Treatment  TRACK: 1    NUMBER OF PARTICIPANTS: 4                                      Service Modality:  Video Visit     Telemedicine Visit: The patient's condition can be safely assessed and treated via synchronous audio and visual telemedicine encounter.      Reason for Telemedicine Visit: Services only offered telehealth    Originating Site (Patient Location): Patient's home    Distant Site (Provider Location): Provider Remote Setting- Home Office    Consent:  The patient/guardian has verbally consented to: the potential risks and benefits of telemedicine (video visit) versus in person care; bill my insurance or make self-payment for services provided; and responsibility for payment of non-covered services.     Patient would like the video invitation sent by:  My Chart    Mode of Communication:  Video Conference via Medical Zoom    As the provider I attest to compliance with applicable laws and regulations related to telemedicine.                Data:    Session content: At the start of this group, patients were invited to check in by identifying themselves, describing their current emotional status, and identifying issues to address in this group.   Area(s) of treatment focus  "addressed in this session included Symptom Management, Personal Safety and Abstinence/Relapse Prevention.     Kandy reported feeling \"nervous\" and \"excited\" today because she has a job interview.  Her goal is to be calm and mindful and not worry too much    Therapeutic Interventions/Treatment Strategies:  Psychotherapist offered support, feedback and validation and reinforced use of skills.    Assessment:    Patient response:   Patient responded to session by accepting feedback, giving feedback and listening    Possible barriers to participation / learning include: and no barriers identified    Health Issues:   None reported       Substance Use Review:   Substance Use: Substance use has decreased    Mental Status/Behavioral Observations  Appearance:   Appropriate   Eye Contact:   Good   Psychomotor Behavior: Normal   Attitude:   Cooperative   Orientation:   All  Speech   Rate / Production: Normal    Volume:  Normal   Mood:    Normal  Affect:    Appropriate   Thought Content:   Clear  Thought Form:  Coherent  Logical     Insight:    Good     Plan:     Safety Plan: No current safety concerns identified.  Recommended that patient call 911 or go to the local ED should there be a change in any of these risk factors.     Barriers to treatment: None identified    Patient Contracts (see media tab):  None    Substance Use: Provided encouragement towards sobriety    Provided support and affirmation for steps taken towards sobriety      Continue or Discharge: Patient will continue in Adult Dual Disorder Program (DDP) as planned. Patient is likely to benefit from learning and using skills as they work toward the goals identified in their treatment plan.      Liliana Olson, [facility]  April 28, 2022    "

## 2022-04-29 ENCOUNTER — HOSPITAL ENCOUNTER (OUTPATIENT)
Dept: BEHAVIORAL HEALTH | Facility: CLINIC | Age: 47
Discharge: HOME | End: 2022-04-29
Attending: PSYCHIATRY & NEUROLOGY
Payer: COMMERCIAL

## 2022-04-29 DIAGNOSIS — F15.90 STIMULANT USE DISORDER: Primary | ICD-10-CM

## 2022-04-29 PROCEDURE — 90853 GROUP PSYCHOTHERAPY: CPT | Mod: GT,95

## 2022-04-29 PROCEDURE — 90853 GROUP PSYCHOTHERAPY: CPT | Mod: GT,95 | Performed by: SOCIAL WORKER

## 2022-04-29 PROCEDURE — G0177 OPPS/PHP; TRAIN & EDUC SERV: HCPCS | Mod: GT,95

## 2022-04-29 NOTE — GROUP NOTE
Psychoeducation Group Note    PATIENT'S NAME: Kandy Yañez  MRN:   [id number]  :   1975  [facility] [id number]  DATE OF SERVICE: 22  START TIME: 11:00 AM  END TIME: 11:50 AM  FACILITATOR: Norberto Burnham RN  TOPIC:  Wellness Group: Medication Education and Management                                    Service Modality:  Video Visit     Telemedicine Visit: The patient's condition can be safely assessed and treated via synchronous audio and visual telemedicine encounter.      Reason for Telemedicine Visit: Covid19    Originating Site (Patient Location): Patient's home    Distant Site (Provider Location): Provider Remote Setting- Home Office    Consent:  The patient/guardian has verbally consented to: the potential risks and benefits of telemedicine (video visit) versus in person care; bill my insurance or make self-payment for services provided; and responsibility for payment of non-covered services.     Patient would like the video invitation sent by:  My Chart    Mode of Communication:  Video Conference via Medical Zoom    As the provider I attest to compliance with applicable laws and regulations related to telemedicine.        [facility] Adult Dual Diagnosis Day Treatment  TRACK: DDP1    NUMBER OF PARTICIPANTS: 5    Summary of Group / Topics Discussed:  Medication Educations and Management:  Medication Assessment/Review: Patients were given a medication quiz to assess their current knowledge about the medications that are prescribed and why they are taking them. Medication lists were reviewed with each patient individually to provide education and answer questions. Safe medication storage, handling, management, and disposal were discussed within the group. Patients completed medication wallet cards    Patient Session Goals / Objectives:  ? Assessed patient understanding of their current medications and indication  ? Identify what to do if a dose is missed  ? Assessed medication  adherence  ? Assessed knowledge of medication side effects and how to treat them      Patient Participation / Response:  Fully participated with the group by sharing personal reflections / insights and openly received / provided feedback with other participants.     Demonstrated understanding of topics discussed through group discussion and participation    Treatment Plan:  Patient has a current master individualized treatment plan.  See Epic treatment plan for more information.    Norberto Burnham RN

## 2022-04-29 NOTE — GROUP NOTE
Service Modality:  Video Visit     Telemedicine Visit: The patient's condition can be safely assessed and treated via synchronous audio and visual telemedicine encounter.      Reason for Telemedicine Visit: Services only offered telehealth    Originating Site (Patient Location): Patient's home    Distant Site (Provider Location): Provider Remote Setting- Home Office    Consent:  The patient/guardian has verbally consented to: the potential risks and benefits of telemedicine (video visit) versus in person care; bill my insurance or make self-payment for services provided; and responsibility for payment of non-covered services.     Patient would like the video invitation sent by:  My Chart    Mode of Communication:  Video Conference via Medical Zoom    As the provider I attest to compliance with applicable laws and regulations related to telemedicine.      Psychoeducation Group Note    PATIENT'S NAME: Kandy Yañez  MRN:   [id number]  :   1975  ACCT. NUMBER: [id number]  DATE OF SERVICE: 22  START TIME:  9:00 AM  END TIME:  9:50 AM  FACILITATOR: Alexsandra Miller LMFT  TOPIC:  Wellness Group: Health Maintenance  [facility] Adult Dual Diagnosis Day Treatment  TRACK: DDP1    NUMBER OF PARTICIPANTS: 5    Summary of Group / Topics Discussed:  Health Maintenance: Weekend planning: Patients were given time to complete a weekend plan of what they will do to promote wellness and sobriety over the weekend when they do not have the structure of group. Patients were encouraged to review progress on their treatment goals and were challenged to identify ways to work toward meeting them. Patients identified and discussed foreseeable barriers to success over the weekend and then developed a plan to overcome them. Patients reviewed their distress coping skills and social support network and discussed this with the group.       Patient Session Goals / Objectives:    ?    Identified  activities to engage in that promote balance in wellness  ?    Distinguished possible barriers to success over the weekend and created a plan to overcome them  ?    Listed distress coping skills and identified social support network to utilize if in crisis during the weekend        Patient Participation / Response:  Fully participated with the group by sharing personal reflections / insights and openly received / provided feedback with other participants.    Demonstrated understanding of topics discussed through group discussion and participation, Identified / Expressed personal readiness to practice skills and Verbalized understanding of health maintenance topic    Treatment Plan:  Patient has a current master individualized treatment plan.  See Epic treatment plan for more information.    GEE Olivo

## 2022-04-29 NOTE — GROUP NOTE
Process Group Note    PATIENT'S NAME: Kandy Yañez  MRN:   [id number]  :   1975  ACCT. NUMBER: [id number]  DATE OF SERVICE: 22  START TIME: 10:00 AM  END TIME: 10:50 AM  FACILITATOR: Christel Cisneros LICSW; Ildefonso Sellers  TOPIC:  Process Group    Diagnoses:  Bipolar I Disorder Current or Most Recent Episode Manic, Mild   300.02 (F41.1) Generalized Anxiety Disorder.  4. Other Diagnoses that is relevant to services:   Substance-Related & Addictive Disorders Stimulant Use Disorder: Specify current severity:  Severe  304.20 (F14.20)Amphetamine type substance, and Severe, Cocaine, Specify if: in remission, Tobacco Use Disorder, Specify current severity:  305.1(F17.200) Moderate, per history PTSD.       [facility] Adult Dual Diagnosis Day Treatment  TRACK: DDP 1    NUMBER OF PARTICIPANTS: 5                                      Service Modality:  Video Visit     Telemedicine Visit: The patient's condition can be safely assessed and treated via synchronous audio and visual telemedicine encounter.      Reason for Telemedicine Visit: Services only offered telehealth    Originating Site (Patient Location): Patient's home    Distant Site (Provider Location): Provider Remote Setting- Home Office    Consent:  The patient/guardian has verbally consented to: the potential risks and benefits of telemedicine (video visit) versus in person care; bill my insurance or make self-payment for services provided; and responsibility for payment of non-covered services.     Patient would like the video invitation sent by:  My Chart    Mode of Communication:  Video Conference via Medical Zoom    As the provider I attest to compliance with applicable laws and regulations related to telemedicine.              Data:  Session content: At the start of this group, patients were invited to check in by identifying themselves, describing their current emotional status, and identifying issues to address in this group.  "  Area(s) of treatment focus addressed in this session included Symptom Management, Personal Safety and Abstinence/Relapse Prevention.     At start of group patient appeared restless, agitated, and checked in as \"I'm feeling irritated and annoyed.\" Said this morning it's been \"one thing after another. I gotta shake it off. Too much going on at once.\" Said she just realized she's missed some doses of her medication and needs to \"get a med box.\" Goal today is to \"keep things simple.\" Stated she'd \"Slept well.\" Said she felt overwhelmed by the weekend planning sheet from the last group.      Writer noted to patient that her affect is incongruent with what she says she's feeling (noting her joking around and bright smile). I asked her if it was okay to be angry. She endorsed feeling it is okay to be angry, though continued to smile and minimize/dismiss her irritation through her words and actions.     During group another patient, distressed and having urges to use, said she was being \"triggered\" by Kandy(patient). Writer noticed Kandy's face had gone flat and appeared restricted. Writer asked how Kandy felt about what this other patient said. She talked about wanting to know what she was doing that was triggering, and it was important to her to know what she was doing \"wrong\" so she could correct it. The other patient was unable to provide the information Kandy wanted, and this appeared to frustrate her.  Kandy refrained from taking additional time (something she'd considered at the start of group), stating in the chat that it was related to the other patient's comments/behavior.     Therapeutic Interventions/Treatment Strategies:  Psychotherapist offered support, feedback and validation and reinforced use of skills. Treatment modalities used include Cognitive Behavioral Therapy and Dialectical Behavioral Therapy. Interventions include Relapse Prevention: Facilitated understanding the importance of awareness of factors that " contribute to relapse , Emotions Management:  Reinforced the purpose and biological basis of emotions and Reviewed opposite action skill and Relationship Skills: Assisted patients in implementing more effective communication skills in their relationships.    Assessment:    Patient response:   Patient responded to session by accepting feedback, giving feedback, listening and being attentive    Possible barriers to participation / learning include: and no barriers identified    Health Issues:   None reported       Substance Use Review:   Substance Use: No active concerns identified.     Possible barriers to participation / learning include: and no barriers identified                   Mental Status/Behavioral Observations  Appearance:   Appropriate   Eye Contact:   Good   Psychomotor Behavior: Normal  Agitated  Restless   Attitude:   Cooperative   Orientation:   All  Speech   Rate / Production: Talkative Normal    Volume:  Loud   Mood:    Elevated  Irritable  Agitated  Affect:    Expansive  Labile   Thought Content:   Clear  Thought Form:  Coherent  Logical     Insight:    Fair     Plan:     Safety Plan: No current safety concerns identified.  Recommended that patient call 911 or go to the local ED should there be a change in any of these risk factors.     Barriers to treatment: None identified    Patient Contracts (see media tab):  None    Substance Use: Not addressed in session     Christel Cisneros, [facility]  April 29, 2022

## 2022-05-02 ENCOUNTER — HOSPITAL ENCOUNTER (OUTPATIENT)
Dept: BEHAVIORAL HEALTH | Facility: CLINIC | Age: 47
Discharge: HOME | End: 2022-05-02
Attending: PSYCHIATRY & NEUROLOGY
Payer: COMMERCIAL

## 2022-05-02 DIAGNOSIS — F41.1 GENERALIZED ANXIETY DISORDER: ICD-10-CM

## 2022-05-02 DIAGNOSIS — F31.9 BIPOLAR I DISORDER (H): Primary | ICD-10-CM

## 2022-05-02 PROCEDURE — G0177 OPPS/PHP; TRAIN & EDUC SERV: HCPCS | Mod: GT,95

## 2022-05-02 PROCEDURE — 90853 GROUP PSYCHOTHERAPY: CPT | Mod: GT,95 | Performed by: COUNSELOR

## 2022-05-02 RX ORDER — VENLAFAXINE 37.5 MG/1
37.5 TABLET ORAL EVERY MORNING
Qty: 30 TABLET | Refills: 0 | Status: SHIPPED | OUTPATIENT
Start: 2022-05-02 | End: 2022-05-03

## 2022-05-02 RX ORDER — DIVALPROEX SODIUM 250 MG/1
250 TABLET, DELAYED RELEASE ORAL 3 TIMES DAILY
Qty: 90 TABLET | Refills: 0 | Status: SHIPPED | OUTPATIENT
Start: 2022-05-02 | End: 2022-05-06

## 2022-05-02 NOTE — GROUP NOTE
Group canceled due to low census    Psychoeducation Group Note    PATIENT'S NAME: Kandy Yañez  MRN:   [id number]  :   1975  ACCT. NUMBER: [id number]  DATE OF SERVICE: 22  START TIME: 11:00 AM  END TIME: 11:15am  FACILITATOR: Norberto Burnham RN  TOPIC: MH Wellness Group: Brain Health                                    Service Modality:  Video Visit     Telemedicine Visit: The patient's condition can be safely assessed and treated via synchronous audio and visual telemedicine encounter.      Reason for Telemedicine Visit: Covid19    Originating Site (Patient Location): Patient's home    Distant Site (Provider Location): Provider Remote Setting- Home Office    Consent:  The patient/guardian has verbally consented to: the potential risks and benefits of telemedicine (video visit) versus in person care; bill my insurance or make self-payment for services provided; and responsibility for payment of non-covered services.     Patient would like the video invitation sent by:  My Chart    Mode of Communication:  Video Conference via Medical Zoom    As the provider I attest to compliance with applicable laws and regulations related to telemedicine.        [facility] Adult Dual Diagnosis Day Treatment  TRACK: DDP1    NUMBER OF PARTICIPANTS: 2      Norberto Burnham RN

## 2022-05-02 NOTE — TELEPHONE ENCOUNTER
Pt requesting refills of depakote and venlafaxine. Pt is having depakote lab drawn tomorrow at her  PCP clinic.     Pt feels her motivation is down for things she knows she needs to do but up for other more distractible tasks. She has been more emotional and hard on herself. She is hoping once the Depakote labs come back medication changes can be evaluated.

## 2022-05-02 NOTE — GROUP NOTE
Psychoeducation Group Note    PATIENT'S NAME: Kandy Yañez  MRN:   [id number]  :   1975  [id number]  DATE OF SERVICE: 22  START TIME: 10:00 AM  END TIME: 10:50 AM  FACILITATOR: Virginia Lino OTR/L  TOPIC: MH Life Skills Group: Communication and Social Skills Development  [facility] Adult Dual Diagnosis Day Treatment  TRACK: ddp1    NUMBER OF PARTICIPANTS: 3                                      Service Modality:  Video Visit     Telemedicine Visit: The patient's condition can be safely assessed and treated via synchronous audio and visual telemedicine encounter.      Reason for Telemedicine Visit: Services only offered telehealth    Originating Site (Patient Location): Patient's home    Distant Site (Provider Location): Provider Remote Setting- Home Office    Consent:  The patient/guardian has verbally consented to: the potential risks and benefits of telemedicine (video visit) versus in person care; bill my insurance or make self-payment for services provided; and responsibility for payment of non-covered services.     Patient would like the video invitation sent by:  My Chart    Mode of Communication:  Video Conference via Medical Zoom    As the provider I attest to compliance with applicable laws and regulations related to telemedicine.        Summary of Group / Topics Discussed:  Communication and Social Skills Development: Communication Styles:  Patients were taught and increased awareness of verbal, nonverbal, and other styles and types of communication and how this impacts interactions with other people.  Patients were given an opportunity to build and practice effective communication skills to advocate and get their needs met directly. All patients practiced different forms of communication with staff and peers.    Patient Session Goals / Objectives:    Identified their particular style of communication and how that impacts their ability to communicate with other people        Improved awareness of important aspects of communication and how this relates to mental health recovery        Established a plan for practice of these skills in their own environments    Practiced and reflected on how to generalize taught skills to their everyday life      Patient Participation / Response:  Fully participated with the group by sharing personal reflections / insights and openly received / provided feedback with other participants.    Patient presentation:   bright, demonstrated understanding of topic through discussion and participation in activities. Pt verbalized struggling with negative self-talk but accepted positive affirmation. She engaged with peers and staff in various forms of expression, and noted she preferred written communication.      Treatment Plan:  Patient has a current master individualized treatment plan.  See Epic treatment plan for more information.    Virginia Lino, OTR/L

## 2022-05-02 NOTE — GROUP NOTE
Process Group Note    PATIENT'S NAME: Kandy Yañez  MRN:   [id number]  :   1975  ACCT. NUMBER: [id number]  DATE OF SERVICE: 22  START TIME:  9:00 AM  END TIME:  9:50 AM  FACILITATOR: Liliana Olson [facility]  TOPIC:  Process Group    Diagnoses:  Bipolar I Disorder Current or Most Recent Episode Manic, Mild   300.02 (F41.1) Generalized Anxiety Disorder.  4. Other Diagnoses that is relevant to services:   Substance-Related & Addictive Disorders Stimulant Use Disorder: Specify current severity:  Severe  304.20 (F14.20)Amphetamine type substance, and Severe, Cocaine, Specify if: in remission, Tobacco Use Disorder, Specify current severity:  305.1(F17.200) Moderate, per history PTSD.       [facility] Adult Dual Diagnosis Day Treatment  TRACK: 1    NUMBER OF PARTICIPANTS: 3                                      Service Modality:  Video Visit     Telemedicine Visit: The patient's condition can be safely assessed and treated via synchronous audio and visual telemedicine encounter.      Reason for Telemedicine Visit: Services only offered telehealth    Originating Site (Patient Location): Patient's home    Distant Site (Provider Location): Provider Remote Setting- Home Office    Consent:  The patient/guardian has verbally consented to: the potential risks and benefits of telemedicine (video visit) versus in person care; bill my insurance or make self-payment for services provided; and responsibility for payment of non-covered services.     Patient would like the video invitation sent by:  My Chart    Mode of Communication:  Video Conference via Medical Zoom    As the provider I attest to compliance with applicable laws and regulations related to telemedicine.              Data:    Session content: At the start of this group, patients were invited to check in by identifying themselves, describing their current emotional status, and identifying issues to address in this group.   Area(s) of treatment focus  "addressed in this session included Symptom Management, Personal Safety and Abstinence/Relapse Prevention.    Kandy reported feeling \"tired\" and \"unmotivated' today.  Her goal for the day is to \"try to stay positive\" today.  Kandy has been struggling with her 4th step and has been beating herself up a lot.  Kandy and group members talked about self-compassion and how she can practice it.      Therapeutic Interventions/Treatment Strategies:  Psychotherapist offered support, feedback and validation and reinforced use of skills. Treatment modalities used include Motivational Interviewing, Cognitive Behavioral Therapy and Dialectical Behavioral Therapy. Interventions include Other: Discussed ways to increase hopefulness.    Assessment:    Patient response:   Patient responded to session by accepting feedback, giving feedback and listening    Possible barriers to participation / learning include: and no barriers identified    Health Issues:   None reported       Substance Use Review:   Substance Use: Substance use has decreased    Mental Status/Behavioral Observations  Appearance:   Appropriate   Eye Contact:   Good   Psychomotor Behavior: Normal   Attitude:   Cooperative   Orientation:   All  Speech   Rate / Production: Normal    Volume:  Normal   Mood:    Hypomanic  Irritable   Affect:    Appropriate   Thought Content:   Clear  Thought Form:  Coherent  Logical     Insight:    Good     Plan:     Safety Plan: No current safety concerns identified.  Recommended that patient call 911 or go to the local ED should there be a change in any of these risk factors.     Barriers to treatment: None identified    Patient Contracts (see media tab):  None    Substance Use: Provided encouragement towards sobriety    Provided support and affirmation for steps taken towards sobriety      Continue or Discharge: Patient will continue in Adult Dual Disorder Program (DDP) as planned. Patient is likely to benefit from learning and using skills as " they work toward the goals identified in their treatment plan.      Liliana Olson, [facility]  May 2, 2022

## 2022-05-03 ENCOUNTER — HOSPITAL ENCOUNTER (OUTPATIENT)
Dept: BEHAVIORAL HEALTH | Facility: CLINIC | Age: 47
Discharge: HOME | End: 2022-05-03
Attending: PSYCHIATRY & NEUROLOGY
Payer: COMMERCIAL

## 2022-05-03 ENCOUNTER — OFFICE VISIT (OUTPATIENT)
Dept: FAMILY MEDICINE | Facility: CLINIC | Age: 47
End: 2022-05-03
Payer: COMMERCIAL

## 2022-05-03 VITALS
DIASTOLIC BLOOD PRESSURE: 84 MMHG | HEART RATE: 67 BPM | BODY MASS INDEX: 26.75 KG/M2 | OXYGEN SATURATION: 99 % | TEMPERATURE: 97.9 F | HEIGHT: 63 IN | WEIGHT: 151 LBS | SYSTOLIC BLOOD PRESSURE: 124 MMHG

## 2022-05-03 DIAGNOSIS — Z00.00 ROUTINE GENERAL MEDICAL EXAMINATION AT A HEALTH CARE FACILITY: Primary | ICD-10-CM

## 2022-05-03 DIAGNOSIS — Z12.11 SCREEN FOR COLON CANCER: ICD-10-CM

## 2022-05-03 DIAGNOSIS — N89.8 VAGINAL DISCHARGE: ICD-10-CM

## 2022-05-03 DIAGNOSIS — Z12.31 VISIT FOR SCREENING MAMMOGRAM: ICD-10-CM

## 2022-05-03 DIAGNOSIS — F41.1 GENERALIZED ANXIETY DISORDER: ICD-10-CM

## 2022-05-03 DIAGNOSIS — F17.210 CIGARETTE NICOTINE DEPENDENCE WITHOUT COMPLICATION: ICD-10-CM

## 2022-05-03 DIAGNOSIS — B96.89 BV (BACTERIAL VAGINOSIS): ICD-10-CM

## 2022-05-03 DIAGNOSIS — F31.32 MODERATE DEPRESSED BIPOLAR I DISORDER (H): ICD-10-CM

## 2022-05-03 DIAGNOSIS — Z23 NEED FOR VACCINATION: ICD-10-CM

## 2022-05-03 DIAGNOSIS — N76.0 BV (BACTERIAL VAGINOSIS): ICD-10-CM

## 2022-05-03 LAB
CLUE CELLS: PRESENT
TRICHOMONAS, WET PREP: ABNORMAL
WBC'S/HIGH POWER FIELD, WET PREP: ABNORMAL
YEAST, WET PREP: ABNORMAL

## 2022-05-03 PROCEDURE — 90853 GROUP PSYCHOTHERAPY: CPT | Mod: GT,95

## 2022-05-03 PROCEDURE — 99386 PREV VISIT NEW AGE 40-64: CPT | Mod: 25 | Performed by: PHYSICIAN ASSISTANT

## 2022-05-03 PROCEDURE — G0177 OPPS/PHP; TRAIN & EDUC SERV: HCPCS | Mod: GT,95

## 2022-05-03 PROCEDURE — 90471 IMMUNIZATION ADMIN: CPT | Performed by: PHYSICIAN ASSISTANT

## 2022-05-03 PROCEDURE — 87210 SMEAR WET MOUNT SALINE/INK: CPT | Performed by: PHYSICIAN ASSISTANT

## 2022-05-03 PROCEDURE — 90472 IMMUNIZATION ADMIN EACH ADD: CPT | Performed by: PHYSICIAN ASSISTANT

## 2022-05-03 PROCEDURE — 90732 PPSV23 VACC 2 YRS+ SUBQ/IM: CPT | Performed by: PHYSICIAN ASSISTANT

## 2022-05-03 PROCEDURE — 90746 HEPB VACCINE 3 DOSE ADULT IM: CPT | Performed by: PHYSICIAN ASSISTANT

## 2022-05-03 RX ORDER — NICOTINE 21 MG/24HR
1 PATCH, TRANSDERMAL 24 HOURS TRANSDERMAL EVERY 24 HOURS
COMMUNITY
End: 2022-05-03

## 2022-05-03 RX ORDER — VENLAFAXINE 37.5 MG/1
37.5 TABLET ORAL EVERY MORNING
Qty: 30 TABLET | Refills: 0 | Status: SHIPPED | OUTPATIENT
Start: 2022-05-03

## 2022-05-03 RX ORDER — METRONIDAZOLE 7.5 MG/G
1 GEL VAGINAL DAILY
Qty: 45 G | Refills: 0 | Status: SHIPPED | OUTPATIENT
Start: 2022-05-03

## 2022-05-03 RX ORDER — HYDROXYZINE PAMOATE 50 MG/1
50-100 CAPSULE ORAL
Qty: 60 CAPSULE | Refills: 1 | Status: SHIPPED | OUTPATIENT
Start: 2022-05-03

## 2022-05-03 RX ORDER — NICOTINE 21 MG/24HR
1 PATCH, TRANSDERMAL 24 HOURS TRANSDERMAL EVERY 24 HOURS
Qty: 30 PATCH | Refills: 0 | Status: SHIPPED | OUTPATIENT
Start: 2022-05-03

## 2022-05-03 ASSESSMENT — ENCOUNTER SYMPTOMS
FEVER: 0
HEARTBURN: 0
HEADACHES: 0
HEMATURIA: 0
BREAST MASS: 0
COUGH: 0
NAUSEA: 0
PARESTHESIAS: 0
ARTHRALGIAS: 1
DIZZINESS: 0
HEMATOCHEZIA: 0
ABDOMINAL PAIN: 0
SHORTNESS OF BREATH: 0
DYSURIA: 0
CONSTIPATION: 0
MYALGIAS: 1
CHILLS: 0
FREQUENCY: 1
NERVOUS/ANXIOUS: 1
EYE PAIN: 0
SORE THROAT: 0
JOINT SWELLING: 0
PALPITATIONS: 0
WEAKNESS: 0
DIARRHEA: 0

## 2022-05-03 NOTE — GROUP NOTE
Process Group Note    PATIENT'S NAME: Kandy Yañez  MRN:   [id number]  :   1975  ACCT. NUMBER: [id number]  DATE OF SERVICE: 22  START TIME: 10:00 AM  END TIME: 10:50 AM  FACILITATOR: Antoinette Bowden LGSW  TOPIC:  Process Group    Diagnoses:  Bipolar I Disorder Current or Most Recent Episode Manic, Mild   300.02 (F41.1) Generalized Anxiety Disorder.  4. Other Diagnoses that is relevant to services:   Substance-Related & Addictive Disorders Stimulant Use Disorder: Specify current severity:  Severe  304.20 (F14.20)Amphetamine type substance, and Severe, Cocaine, Specify if: in remission, Tobacco Use Disorder, Specify current severity:  305.1(F17.200) Moderate, per history PTSD.     [facility] Adult Dual Diagnosis Day Treatment  TRACK: [facility] 1    NUMBER OF PARTICIPANTS: 4                                      Service Modality:  Video Visit     Telemedicine Visit: The patient's condition can be safely assessed and treated via synchronous audio and visual telemedicine encounter.      Reason for Telemedicine Visit: Services only offered telehealth    Originating Site (Patient Location): Patient's home    Distant Site (Provider Location): Provider Remote Setting- Home Office    Consent:  The patient/guardian has verbally consented to: the potential risks and benefits of telemedicine (video visit) versus in person care; bill my insurance or make self-payment for services provided; and responsibility for payment of non-covered services.     Patient would like the video invitation sent by:  My Chart    Mode of Communication:  Video Conference via Medical Zoom    As the provider I attest to compliance with applicable laws and regulations related to telemedicine.                Data:    Session content: At the start of this group, patients were invited to check in by identifying themselves, describing their current emotional status, and identifying issues to address in this group.   Area(s) of treatment focus  "addressed in this session included Symptom Management, Personal Safety, Community Resources/Discharge Planning and Abstinence/Relapse Prevention.  Client reported feeling positive.  She is proud of making it to step 4 (resentments) with her 12 step program.  She is proud of avoiding contact with her ex partner when they called her yesterday.  She notices a decrease in compulsive thoughts about her ex partner.  She uses prayer and \"looking outside myself\" to decrease her anxiety.  She also practices a willing attitude (\"I don't want to do it but it is what I need to do\").  She states that her kary helps her pause and reflect before acting.  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  She practices optimism and reminds herself that her job is flexible should any barriers arise with getting this document.  She denies safety concerns, chemical use and is taking her medications as prescribed.  Peers offered supportive feedback related to her job and use of coping skills.    Therapeutic Interventions/Treatment Strategies:  Psychotherapist offered support, feedback and validation and reinforced use of skills. Treatment modalities used include Cognitive Behavioral Therapy. Interventions include Coping Skills: Discussed the application of prayer, willingness and positive thinking to reduce symptoms.    Assessment:    Patient response:   Patient responded to session by accepting feedback, giving feedback, listening, focusing on goals, being attentive and accepting support    Possible barriers to participation / learning include: and no barriers identified    Health Issues:   None reported       Substance Use Review:   Substance Use: No active concerns identified.    Mental Status/Behavioral Observations  Appearance:   Appropriate   Eye Contact:   Good   Psychomotor Behavior: Normal   Attitude:   Cooperative  Interested Friendly Pleasant  Orientation:   All  Speech   Rate / " Production: Normal    Volume:  Normal   Mood:    Euthymic  Affect:    Appropriate   Thought Content:   Clear and Safety denies any current safety concerns including suicidal ideation, self-harm, and homicidal ideation  Thought Form:  Coherent  Logical     Insight:    Good     Plan:     Safety Plan: No current safety concerns identified.  Recommended that patient call 911 or go to the local ED should there be a change in any of these risk factors.     Barriers to treatment: None identified    Patient Contracts (see media tab):  None    Substance Use: Not addressed in session     Continue or Discharge: Patient will continue in Adult Dual Disorder Program (DDP) as planned. Patient is likely to benefit from learning and using skills as they work toward the goals identified in their treatment plan.      FERNANDA Crawford  May 3, 2022

## 2022-05-03 NOTE — GROUP NOTE
Psychoeducation Group Note    PATIENT'S NAME: Kandy Yañez  MRN:   [id number]  :   1975  [id number]  DATE OF SERVICE: 22  START TIME: 11:00 AM  END TIME: 11:50 AM  FACILITATOR: Norberto Burnham RN  TOPIC:  Wellness Group: Brain Health                                    Service Modality:  Video Visit     Telemedicine Visit: The patient's condition can be safely assessed and treated via synchronous audio and visual telemedicine encounter.      Reason for Telemedicine Visit: Covid19    Originating Site (Patient Location): Patient's home    Distant Site (Provider Location): Provider Remote Setting- Home Office    Consent:  The patient/guardian has verbally consented to: the potential risks and benefits of telemedicine (video visit) versus in person care; bill my insurance or make self-payment for services provided; and responsibility for payment of non-covered services.     Patient would like the video invitation sent by:  My Chart    Mode of Communication:  Video Conference via Medical Zoom    As the provider I attest to compliance with applicable laws and regulations related to telemedicine.        [facility] Adult Dual Diagnosis Day Treatment  TRACK: DDP1    NUMBER OF PARTICIPANTS: 3    Summary of Group / Topics Discussed:   Brain Health:  Pathophysiology of Mood Disorders: Patients were educated on mood disorder etiology and neuroscience, risk factors, symptoms, and pharmacologic, psychotherapeutic, and complementary treatment options. Patients were guided on a discussion of mental, behavioral, and physical symptoms and shared their symptoms with the group.     Patient Session Goals / Objectives:  ? Described what mood disorders are and identified risk factors   ? Explained how chemical imbalances in the brain can cause symptoms and how medications work to reverse this imbalance   ? Identified and described pharmacologic, psychotherapeutic, and complementary treatment  options      Patient Participation / Response:  Fully participated with the group by sharing personal reflections / insights and openly received / provided feedback with other participants.    Demonstrated understanding of topics discussed through group discussion and participation    Treatment Plan:  Patient has a current master individualized treatment plan.  See Epic treatment plan for more information.    Norberto Burnham RN

## 2022-05-03 NOTE — PROGRESS NOTES
SUBJECTIVE:   CC: Kandy Yañez is an 46 year old woman who presents for preventive health visit.       Patient has been advised of split billing requirements and indicates understanding: Yes  Healthy Habits:     Getting at least 3 servings of Calcium per day:  Yes    Bi-annual eye exam:  Yes    Dental care twice a year:  NO    Sleep apnea or symptoms of sleep apnea:  None    Diet:  Regular (no restrictions)    Frequency of exercise:  4-5 days/week    Duration of exercise:  15-30 minutes    Taking medications regularly:  Yes    Barriers to taking medications:  None    Medication side effects:  None    PHQ-2 Total Score: 0    Additional concerns today:  Yes  Vaginal Problem   Associated symptoms include frequency. Pertinent negatives include no fever, no abdominal pain, no constipation, no diarrhea, no nausea and no dysuria.           -------------------------------------    Today's PHQ-2 Score:   PHQ-2 ( 1999 Pf[employer]) 5/3/2022   Q1: Little interest or pleasure in doing things 0   Q2: Feeling down, depressed or hopeless 0   PHQ-2 Score 0   Q1: Little interest or pleasure in doing things Not at all   Q2: Feeling down, depressed or hopeless Not at all   PHQ-2 Score 0       Abuse: Current or Past (Physical, Sexual or Emotional) - No  Do you feel safe in your environment? Yes    Have you ever done Advance Care Planning? (For example, a Health Directive, POLST, or a discussion with a medical provider or your loved ones about your wishes): No, advance care planning information given to patient to review.  Patient declined advance care planning discussion at this time.    Social History     Tobacco Use     Smoking status: Current Every Day Smoker     Packs/day: 0.50     Years: 20.00     Pack years: 10.00     Types: Cigarettes     Start date: 12/28/1989     Smokeless tobacco: Never Used     Tobacco comment: plans to start patches this weekend   Substance Use Topics     Alcohol use: No         Alcohol Use 5/3/2022    Prescreen: >3 drinks/day or >7 drinks/week? Not Applicable       Reviewed orders with patient.  Reviewed health maintenance and updated orders accordingly - Yes      Breast Cancer Screening:    Breast CA Risk Assessment (FHS-7) 5/3/2022   Do you have a family history of breast, colon, or ovarian cancer? No / Unknown           History of abnormal Pap smear: NO - age 30-65 PAP every 5 years with negative HPV co-testing recommended  PAP / HPV Latest Ref Rng & Units 2/17/2020 12/28/2016 4/9/2015   PAP Negative for squamous intraepithelial lesion or malignancy. Negative for squamous intraepithelial lesion or malignancy  Electronically signed by Sandrita Lerner CT (ASCP) on 2/26/2020 at  7:38 AM   Negative for squamous intraepithelial lesion or malignancy  Electronically signed by Kandy Pickard CT (ASCP) on 1/5/2017 at  3:46 PM   Atypical squamous cells of undetermined significance  Electronically signed by Peyman Nguyen MD on 4/16/2015 at  4:32 PM     HPV16 NEG Negative - -   HPV18 NEG Negative - -   HRHPV NEG Negative - -     Reviewed and updated as needed this visit by clinical staff   Tobacco  Allergies  Meds   Med Hx  Surg Hx  Fam Hx  Soc Hx            Review of Systems   Constitutional: Negative for chills and fever.   HENT: Negative for congestion, ear pain, hearing loss and sore throat.    Eyes: Negative for pain and visual disturbance.   Respiratory: Negative for cough and shortness of breath.    Cardiovascular: Negative for chest pain, palpitations and peripheral edema.   Gastrointestinal: Negative for abdominal pain, constipation, diarrhea, heartburn, hematochezia and nausea.   Breasts:  Negative for tenderness, breast mass and discharge.   Genitourinary: Positive for frequency and vaginal discharge. Negative for dysuria, genital sores, hematuria, pelvic pain, urgency and vaginal bleeding.   Musculoskeletal: Positive for arthralgias and myalgias. Negative for joint swelling.   Skin:  "Negative for rash.   Neurological: Negative for dizziness, weakness, headaches and paresthesias.   Psychiatric/Behavioral: Positive for mood changes. The patient is nervous/anxious.           OBJECTIVE:   /84   Pulse 67   Temp 97.9  F (36.6  C) (Oral)   Ht 1.6 m (5' 3\")   Wt 68.5 kg (151 lb)   SpO2 99%   BMI 26.75 kg/m       Physical Exam  GENERAL: healthy, alert and no distress  EYES: Eyes grossly normal to inspection, PERRL and conjunctivae and sclerae normal  HENT: ear canals and TM's normal, nose and mouth without ulcers or lesions  NECK: no adenopathy, no asymmetry, masses, or scars and thyroid normal to palpation  RESP: lungs clear to auscultation - no rales, rhonchi or wheezes  CV: regular rate and rhythm, normal S1 S2, no S3 or S4, no murmur, click or rub, no peripheral edema and peripheral pulses strong  ABDOMEN: soft, nontender, no hepatosplenomegaly, no masses and bowel sounds normal  MS: no gross musculoskeletal defects noted, no edema  SKIN: no suspicious lesions or rashes  NEURO: Normal strength and tone, mentation intact and speech normal  PSYCH: mentation appears normal, affect normal/bright    Diagnostic Test Results:  Labs reviewed in Epic    ASSESSMENT/PLAN:       ICD-10-CM    1. Routine general medical examination at a health care facility  Z00.00 MA SCREENING DIGITAL BILAT - Future  (s+30)     Adult Gastro Ref - Procedure Only   2. Cigarette nicotine dependence without complication  F17.210 nicotine (NICODERM CQ) 21 MG/24HR 24 hr patch   3. Visit for screening mammogram  Z12.31 MA SCREENING DIGITAL BILAT - Future  (s+30)   4. Screen for colon cancer  Z12.11 Adult Gastro Ref - Procedure Only   5. Moderate depressed bipolar I disorder (H)  F31.32 hydrOXYzine (VISTARIL) 50 MG capsule   6. Generalized anxiety disorder  F41.1 venlafaxine (EFFEXOR) 37.5 MG tablet   7. Vaginal discharge  N89.8 Wet prep - Clinic Collect   8. Need for vaccination  Z23 HEPATITIS B VACCINE,  ADULT  [[id number]]    " " Pneumococcal vaccine 23 valent PPSV23  (Pneumovax) [29315]   9. BV (bacterial vaginosis)  N76.0 metroNIDAZOLE (METROGEL) 0.75 % vaginal gel    B96.89        Patient has been advised of split billing requirements and indicates understanding: Yes    COUNSELING:  Reviewed preventive health counseling, as reflected in patient instructions    Estimated body mass index is 26.75 kg/m  as calculated from the following:    Height as of this encounter: 1.6 m (5' 3\").    Weight as of this encounter: 68.5 kg (151 lb).        She reports that she has been smoking cigarettes. She started smoking about 32 years ago. She has a 10.00 pack-year smoking history. She has never used smokeless tobacco.  Tobacco Cessation Action Plan:   Pt recently quit      Counseling Resources:  ATP IV Guidelines  Pooled Cohorts Equation Calculator  Breast Cancer Risk Calculator  BRCA-Related Cancer Risk Assessment: FHS-7 Tool  FRAX Risk Assessment  ICSI Preventive Guidelines  Dietary Guidelines for Americans, 2010  USDA's MyPlate  ASA Prophylaxis  Lung CA Screening    Agustin Prado PA-C  [facility]  "

## 2022-05-04 ENCOUNTER — HOSPITAL ENCOUNTER (OUTPATIENT)
Dept: BEHAVIORAL HEALTH | Facility: CLINIC | Age: 47
Discharge: HOME | End: 2022-05-04
Attending: PSYCHIATRY & NEUROLOGY
Payer: COMMERCIAL

## 2022-05-04 PROCEDURE — 90853 GROUP PSYCHOTHERAPY: CPT | Mod: GT,95

## 2022-05-04 NOTE — GROUP NOTE
Psychotherapy Group Note    PATIENT'S NAME: Kandy Yañez  MRN:   [id number]  :   1975  [id number]  DATE OF SERVICE: 22  START TIME: 11:00 AM  END TIME: 11:50 AM  FACILITATOR: Nayeli Rockwell  TOPIC: MH EBP Group: Relationship Skills  [facility] Adult Mental Health Day Treatment  TRACK: DDP and 6A    NUMBER OF PARTICIPANTS: 7 total                                      Service Modality:  Video Visit     Telemedicine Visit: The patient's condition can be safely assessed and treated via synchronous audio and visual telemedicine encounter.      Reason for Telemedicine Visit: Services only offered telehealth    Originating Site (Patient Location): Patient's home    Distant Site (Provider Location): Provider Remote Setting- Home Office    Consent:  The patient/guardian has verbally consented to: the potential risks and benefits of telemedicine (video visit) versus in person care; bill my insurance or make self-payment for services provided; and responsibility for payment of non-covered services.     Patient would like the video invitation sent by:  My Chart    Mode of Communication:  Video Conference via Medical Zoom    As the provider I attest to compliance with applicable laws and regulations related to telemedicine.         Summary of Group / Topics Discussed:  Relationship Skills: Assertive Communication: Patients were provided with a general overview of assertive communication skills and how practicing assertive communication skills will assist patients in developing healthier and more effective relationships. Patients reviewed their current awareness on ability to practice assertive communication, ways to increase assertive communication, and identified/problem solved barriers to assertive communication.     Patient Session Goals / Objectives:    Identified and discussed patient individual challenges with communication    Presented and practiced effective communication skills in  session    Assisted patients in implementing more effective communication skills in their relationships      Patient Participation / Response:  Fully participated with the group by sharing personal reflections / insights and openly received / provided feedback with other participants.    Demonstrated understanding of topics discussed through group discussion and participation, Demonstrated understanding of relationship skills and communication skills and Identified / Expressed personal readiness to incorporate effective communication skills    Treatment Plan:  Patient has a current master individualized treatment plan.  See Epic treatment plan for more information.    Nayeli Rockwell

## 2022-05-04 NOTE — GROUP NOTE
Process Group Note    PATIENT'S NAME: Kandy Yañez  MRN:   [id number]  :   1975  [facility]. NUMBER: [id number]  DATE OF SERVICE: 22  START TIME:  9:00 AM  END TIME:  9:50 AM  FACILITATOR: Tea Ferguson LICSW  TOPIC:  Process Group    Diagnoses:  Bipolar I Disorder Current or Most Recent Episode Manic, Mild   300.02 (F41.1) Generalized Anxiety Disorder.  4. Other Diagnoses that is relevant to services:   Substance-Related & Addictive Disorders Stimulant Use Disorder: Specify current severity:  Severe  304.20 (F14.20)Amphetamine type substance, and Severe, Cocaine, Specify if: in remission, Tobacco Use Disorder, Specify current severity:  305.1(F17.200) Moderate, per history PTSD.       [facility] Adult Dual Diagnosis Day Treatment  TRACK: joined 6A today                              Service Modality:  Video Visit     Telemedicine Visit: The patient's condition can be safely assessed and treated via synchronous audio and visual telemedicine encounter.      Reason for Telemedicine Visit: Services only offered telehealth    Originating Site (Patient Location): Patient's home    Distant Site (Provider Location): [facility] MENT[facility] HE[facility]    Consent:  The patient/guardian has verbally consented to: the potential risks and benefits of telemedicine (video visit) versus in person care; bill my insurance or make self-payment for services provided; and responsibility for payment of non-covered services.     Patient would like the video invitation sent by:  My Chart    Mode of Communication:  Video Conference via Medical Zoom    As the provider I attest to compliance with applicable laws and regulations related to telemedicine.         NUMBER OF PARTICIPANTS: 7          Data:    Session content: At the start of this group, patients were invited to check in by identifying themselves, describing their current emotional status, and identifying issues to address in this group.    Area(s) of treatment focus addressed in this session included Symptom Management and Personal Safety.  Pt joins ADT group today. She reports feeling hopeful and optimistic. She shares her recovery story and the importance of sobriety support groups, DDP and prayer. Pt is grateful for an [employer] one mile away. She reports benefiting from structure and is looking forward to starting a new job on Saturday. Denies safety concerns.    Therapeutic Interventions/Treatment Strategies:  Psychotherapist offered support, feedback and validation and reinforced use of skills.    Assessment:    Patient response:   Patient responded to session by accepting feedback, giving feedback, listening, focusing on goals, being attentive and accepting support    Possible barriers to participation / learning include: and no barriers identified    Health Issues:   None reported       Substance Use Review:   Substance Use: cocaine  and amphetimine.     Mental Status/Behavioral Observations  Appearance:   Appropriate   Eye Contact:   Good   Psychomotor Behavior: Normal   Attitude:   Cooperative   Orientation:   All  Speech   Rate / Production: Normal    Volume:  Normal   Mood:    Normal  Affect:    Appropriate   Thought Content:   Clear  Thought Form:  Coherent  Logical     Insight:    Good     Plan:     Safety Plan: No current safety concerns identified.  Recommended that patient call 911 or go to the local ED should there be a change in any of these risk factors.     Barriers to treatment: None identified    Patient Contracts (see media tab):  None    Substance Use: Provided encouragement towards sobriety     Continue or Discharge: Patient will continue in Adult Dual Disorder Program (DDP) as planned. Patient is likely to benefit from learning and using skills as they work toward the goals identified in their treatment plan.      Tea Riggs, [facility]  May 4, 2022

## 2022-05-04 NOTE — GROUP NOTE
Psychotherapy Group Note    PATIENT'S NAME: Kandy Yañez  MRN:   [id number]  :   1975  [id number]  DATE OF SERVICE: 22  START TIME: 10:00 AM  END TIME: 10:50 AM  FACILITATOR: Nayeli Rockwell  TOPIC: MH EBP Group: Relationship Skills  [facility] Adult Dual Diagnosis Day Treatment  TRACK: DDP combined with 6A    NUMBER OF PARTICIPANTS: 7 total                                      Service Modality:  Video Visit     Telemedicine Visit: The patient's condition can be safely assessed and treated via synchronous audio and visual telemedicine encounter.      Reason for Telemedicine Visit: Services only offered telehealth    Originating Site (Patient Location): Patient's home    Distant Site (Provider Location): Provider Remote Setting- Home Office    Consent:  The patient/guardian has verbally consented to: the potential risks and benefits of telemedicine (video visit) versus in person care; bill my insurance or make self-payment for services provided; and responsibility for payment of non-covered services.     Patient would like the video invitation sent by:  My Chart    Mode of Communication:  Video Conference via Medical Zoom    As the provider I attest to compliance with applicable laws and regulations related to telemedicine.          Summary of Group / Topics Discussed:  Relationship Skills: Assertive Communication: Patients were provided with a general overview of assertive communication skills and how practicing assertive communication skills will assist patients in developing healthier and more effective relationships. Patients reviewed their current awareness on ability to practice assertive communication, ways to increase assertive communication, and identified/problem solved barriers to assertive communication.     Patient Session Goals / Objectives:    Identified and discussed patient individual challenges with communication    Presented and practiced effective communication  skills in session    Assisted patients in implementing more effective communication skills in their relationships      Patient Participation / Response:  Fully participated with the group by sharing personal reflections / insights and openly received / provided feedback with other participants.    Demonstrated understanding of topics discussed through group discussion and participation, Demonstrated understanding of relationship skills and communication skills and Identified / Expressed personal readiness to incorporate effective communication skills    Treatment Plan:  Patient has a current master individualized treatment plan.  See Epic treatment plan for more information.    Nayeli Rockwell

## 2022-05-05 ENCOUNTER — HOSPITAL ENCOUNTER (OUTPATIENT)
Dept: BEHAVIORAL HEALTH | Facility: CLINIC | Age: 47
Discharge: HOME | End: 2022-05-05
Attending: PSYCHIATRY & NEUROLOGY
Payer: COMMERCIAL

## 2022-05-05 PROCEDURE — 90853 GROUP PSYCHOTHERAPY: CPT | Mod: GT,95

## 2022-05-05 NOTE — GROUP NOTE
Psychotherapy Group Note    PATIENT'S NAME: Kandy Yañez  MRN:   [id number]  :   1975  [id number]  DATE OF SERVICE: 22  START TIME: 10:00 AM  END TIME: 10:50 AM  FACILITATOR: Ildefonso Sellers; [facility]  TOPIC:  EBP Group: DDP Relapse Prevention  [facility] Adult Dual Diagnosis Day Treatment  TRACK: DDP2                                      Service Modality:  Video Visit     Telemedicine Visit: The patient's condition can be safely assessed and treated via synchronous audio and visual telemedicine encounter.      Reason for Telemedicine Visit: Services only offered telehealth    Originating Site (Patient Location): Patient's home    Distant Site (Provider Location): Provider Remote Setting- Home Office    Consent:  The patient/guardian has verbally consented to: the potential risks and benefits of telemedicine (video visit) versus in person care; bill my insurance or make self-payment for services provided; and responsibility for payment of non-covered services.     Patient would like the video invitation sent by:  My Chart    Mode of Communication:  Video Conference via Medical Zoom    As the provider I attest to compliance with applicable laws and regulations related to telemedicine.          NUMBER OF PARTICIPANTS: 5      Summary of Group / Topics Discussed:  DDP Relapse Prevention: Goodbye Letter: Patients were assigned topic of writing a goodbye letter to their substance of abuse and presented the goodbye letter to the group. Purpose of this assignment is to process grief around loss of substance and coping with emotions of not having substances as a way to cope with stressors and difficult emotions. This assignment highlights maladaptive relationship patient has with the substance and gain awareness about how substance use impacted functioning. This group is conducted in a two part series. First group is education about assignment and reading example goodbye  letters. Second group allows patients to read their completed goodbye letters to group members and receive feedback and validation.     Patient Session Goals / Objectives:    Processed grief and loss of the substances    Gained awareness of maladaptive relationship with substance     Received support, feedback, and validation from peers      Patient Participation / Response:  Fully participated with the group by sharing personal reflections / insights and openly received / provided feedback with other participants.    Demonstrated understanding of topics discussed through group discussion and participation and Demonstrated understanding of utilizing relapse prevention skills to manage urges and maintain sobriety    Treatment Plan:  Patient has a current master individualized treatment plan.  See Epic treatment plan for more information.    Ildefonso Sellers

## 2022-05-05 NOTE — GROUP NOTE
Psychotherapy Group Note    PATIENT'S NAME: Kandy Yañez  MRN:   [id number]  :   1975  ACCT. NUMBER: [id number]  DATE OF SERVICE: 22  START TIME: 12:00 PM  END TIME: 12:50 PM  FACILITATOR: Naomy Thurston LGSW  TOPIC: MH EBP Group: Mindfulness  [facility] Adult Dual Diagnosis Day Treatment  TRACK: 2    NUMBER OF PARTICIPANTS: 4    Summary of Group / Topics Discussed:  Mindfulness: Mindfulness Skills: Patients received information on the main components of mindfulness. Patients participated in discussion on how to practice observing, describing, and participating in internal and external environments. Relevance of mindfulness skills to overall mental and physical health was explored.  Patients explored and discussed in group their current awareness and knowledge of mindfulness skills as well as barriers to applying skills.    Patient Session Goals / Objectives:    Demonstrated and verbalized understanding of key mindfulness concepts    Identified when/how to use mindfulness skills    Resolved barriers to practicing mindfulness skills    Identified plan to use mindfulness skills in daily life     Service Modality:  Video Visit     Telemedicine Visit: The patient's condition can be safely assessed and treated via synchronous audio and visual telemedicine encounter.      Reason for Telemedicine Visit: Services only offered telehealth    Originating Site (Patient Location): Patient's home    Distant Site (Provider Location): Provider Remote Setting- Home Office    Consent:  The patient/guardian has verbally consented to: the potential risks and benefits of telemedicine (video visit) versus in person care; bill my insurance or make self-payment for services provided; and responsibility for payment of non-covered services.     Patient would like the video invitation sent by:  My Chart    Mode of Communication:  Video Conference via Medical Zoom    As the provider I attest to compliance with applicable  laws and regulations related to telemedicine.               Patient Participation / Response:  Fully participated with the group by sharing personal reflections / insights and openly received / provided feedback with other participants.    Identified / Expressed personal readiness to practice mindfulness skills and Verbalized understanding of how mindfulness can benefit mental health symptoms    Treatment Plan:  Patient has a current master individualized treatment plan.  See Epic treatment plan for more information.    Naomy Thurston, LGSW
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
## 2022-05-06 ENCOUNTER — HOSPITAL ENCOUNTER (OUTPATIENT)
Dept: BEHAVIORAL HEALTH | Facility: CLINIC | Age: 47
Discharge: HOME | End: 2022-05-06
Attending: PSYCHIATRY & NEUROLOGY
Payer: COMMERCIAL

## 2022-05-06 ENCOUNTER — TELEPHONE (OUTPATIENT)
Dept: BEHAVIORAL HEALTH | Facility: CLINIC | Age: 47
End: 2022-05-06
Payer: COMMERCIAL

## 2022-05-06 DIAGNOSIS — F31.9 BIPOLAR I DISORDER (H): ICD-10-CM

## 2022-05-06 DIAGNOSIS — F15.90 STIMULANT USE DISORDER: Primary | ICD-10-CM

## 2022-05-06 PROCEDURE — 90853 GROUP PSYCHOTHERAPY: CPT | Mod: GT,95

## 2022-05-06 RX ORDER — DIVALPROEX SODIUM 250 MG/1
TABLET, DELAYED RELEASE ORAL
Qty: 90 TABLET | Refills: 0 | COMMUNITY
Start: 2022-05-06 | End: 2022-05-24

## 2022-05-06 NOTE — GROUP NOTE
Process Group Note    PATIENT'S NAME: Kandy Yañez  MRN:   [id number]  :   1975  ACCT. NUMBER: [id number]  DATE OF SERVICE: 22  START TIME: 10:00 AM  END TIME: 10:50 AM  FACILITATOR: Naomy Thurston LGSW  TOPIC:  Process Group    Diagnoses:  Bipolar I Disorder Current or Most Recent Episode Manic, Mild   300.02 (F41.1) Generalized Anxiety Disorder.  4. Other Diagnoses that is relevant to services:   Substance-Related & Addictive Disorders Stimulant Use Disorder: Specify current severity:  Severe  304.20 (F14.20)Amphetamine type substance, and Severe, Cocaine, Specify if: in remission, Tobacco Use Disorder, Specify current severity:  305.1(F17.200) Moderate, per history PTSD    [facility] Adult Dual Diagnosis Day Treatment  TRACK: 1 & 2     NUMBER OF PARTICIPANTS: 6    Service Modality:  Video Visit     Telemedicine Visit: The patient's condition can be safely assessed and treated via synchronous audio and visual telemedicine encounter.      Reason for Telemedicine Visit: Services only offered telehealth    Originating Site (Patient Location): Patient's home    Distant Site (Provider Location): Provider Remote Setting- Home Office    Consent:  The patient/guardian has verbally consented to: the potential risks and benefits of telemedicine (video visit) versus in person care; bill my insurance or make self-payment for services provided; and responsibility for payment of non-covered services.     Patient would like the video invitation sent by:  My Chart    Mode of Communication:  Video Conference via Medical Zoom    As the provider I attest to compliance with applicable laws and regulations related to telemedicine.                    Data:    Session content: At the start of this group, patients were invited to check in by identifying themselves, describing their current emotional status, and identifying issues to address in this group.   Area(s) of treatment focus addressed in this session  "included Symptom Management, Personal Safety, Community Resources/Discharge Planning and Abstinence/Relapse Prevention.    Kandy reports that she is feeling determined and needs to get things done before work. She denies safety concerns. She is working on a goal today of getting to [facility] and open a checking and savings account. She reports that a barrier is a lack of patience and frustration. She is using skills of being mindful and breathing. She reports that she is feeling anxiety and is empathic towards others that are having panic. She denies chemical use. She reports that something going well for her today is that she is proud and grateful for having all her tax checks. She has plans to pay her fines and get her license back. She is feeling proud of the forward progression.     Therapeutic Interventions/Treatment Strategies:  Psychotherapist offered support, feedback and validation and reinforced use of skills. Treatment modalities used include Motivational Interviewing, Cognitive Behavioral Therapy and Dialectical Behavioral Therapy. Interventions include Behavioral Activation: Reinforced benefits/challenges of change process through applying skills to replace unwanted behaviors, Cognitive Restructuring:  Explored impact of ineffective thoughts / distortions on mood and activity, Coping Skills: Discussed how the use of intentional \"in the moment\" actions can help reduce distress, Relapse Prevention: Coached on skills to manage factors that contribute to relapse, Mindfulness: Facilitated discussion of when/how to use mindfulness skills to benefit general health, mental health symptoms, and stressors, Symptoms Management: Promoted understanding of their diagnoses and how it impacts their functioning and Emotions Management:  Reinforced the purpose and biological basis of emotions, Discussed barriers to emotional regulation, Reviewed opposite action skill and Increased awareness of daily mood " patterns/changes.    Assessment:    Patient response:   Patient responded to session by accepting feedback, giving feedback, listening, focusing on goals, being attentive and accepting support    Possible barriers to participation / learning include: and no barriers identified    Health Issues:   None reported       Substance Use Review:   Substance Use: Substance use has decreased    Mental Status/Behavioral Observations  Appearance:   Appropriate   Eye Contact:   Good   Psychomotor Behavior: Normal   Attitude:   Cooperative   Orientation:   All  Speech   Rate / Production: Normal    Volume:  Normal   Mood:    Normal  Affect:    Appropriate   Thought Content:   Clear and Safety denies any current safety concerns including suicidal ideation, self-harm, and homicidal ideation  Thought Form:  Coherent  Logical     Insight:    Good     Plan:     Safety Plan: No current safety concerns identified.  Recommended that patient call 911 or go to the local ED should there be a change in any of these risk factors.     Barriers to treatment: None identified    Patient Contracts (see media tab):  None    Substance Use: Provided support and affirmation for steps taken towards sobriety      Continue or Discharge: Patient will continue in Adult Dual Disorder Program (DDP) as planned. Patient is likely to benefit from learning and using skills as they work toward the goals identified in their treatment plan.      Naomy Thurston, LGSW  May 6, 2022     Detail Level: Zone

## 2022-05-06 NOTE — GROUP NOTE
Psychotherapy Group Note    PATIENT'S NAME: Kandy Yañez  MRN:   [id number]  :   1975  [id number]  DATE OF SERVICE: 22  START TIME: 12:00 PM  END TIME: 12:50 PM  FACILITATOR: Naomy Thurston LGSW  TOPIC: MH EBP Group: Coping Skills  [facility] Adult Dual Diagnosis Day Treatment  TRACK: 1 & 2    NUMBER OF PARTICIPANTS: 6     Summary of Group / Topics Discussed:  Coping Skills: Building Positive Experiences: Patients discussed the importance of planning and engaging in positive experiences, as strategies to increase positive thinking, hope, and self-worth.  Explored the benefits of planning / creating positive experiences, including recognizing and reducing negativity bias by focusing on and building positive experiences.   Several approaches to building positive experiences were presented and discussed relevant to each patient.      Patient Session Goals / Objectives:    Understand the purpose of planning / creating / participating / sharing in positive experiences.    Explore patient s experiences related to negative thinking and how it influences activities and moodIdentify current positive events in patient s life.     Set goals to increase a variety of positive experiences.    Address barriers to planning / engaging in positive experiences.    Service Modality:  Video Visit     Telemedicine Visit: The patient's condition can be safely assessed and treated via synchronous audio and visual telemedicine encounter.      Reason for Telemedicine Visit: Services only offered telehealth    Originating Site (Patient Location): Patient's home    Distant Site (Provider Location): Provider Remote Setting- Home Office    Consent:  The patient/guardian has verbally consented to: the potential risks and benefits of telemedicine (video visit) versus in person care; bill my insurance or make self-payment for services provided; and responsibility for payment of non-covered services.     Patient  would like the video invitation sent by:  My Chart    Mode of Communication:  Video Conference via Medical Zoom    As the provider I attest to compliance with applicable laws and regulations related to telemedicine.                 Patient Participation / Response:  Fully participated with the group by sharing personal reflections / insights and openly received / provided feedback with other participants.    Opted not to participate in the following aspects of today's group: Building Positive Experiences , due to listening and following along.     Treatment Plan:  Patient has a current master individualized treatment plan.  See Epic treatment plan for more information.    Naomy Thurston, LGSW

## 2022-05-06 NOTE — GROUP NOTE
Psychoeducation Group Note    PATIENT'S NAME: Kandy Yañez  MRN:   [id number]  :   1975  [id number]  DATE OF SERVICE: 22  START TIME: 11:00 AM  END TIME: 11:50 AM  FACILITATOR: Ildefonso Sellers; Liliana Olson [facility]  TOPIC:  Wellness Group: Health Maintenance  [facility] Adult Dual Diagnosis Day Treatment  TRACK: DDP2                                      Service Modality:  Video Visit     Telemedicine Visit: The patient's condition can be safely assessed and treated via synchronous audio and visual telemedicine encounter.      Reason for Telemedicine Visit: Services only offered telehealth    Originating Site (Patient Location): Patient's home    Distant Site (Provider Location): Provider Remote Setting- Home Office    Consent:  The patient/guardian has verbally consented to: the potential risks and benefits of telemedicine (video visit) versus in person care; bill my insurance or make self-payment for services provided; and responsibility for payment of non-covered services.     Patient would like the video invitation sent by:  My Chart    Mode of Communication:  Video Conference via Medical Zoom    As the provider I attest to compliance with applicable laws and regulations related to telemedicine.        NUMBER OF PARTICIPANTS: 5    Summary of Group / Topics Discussed:  Health Maintenance: Weekend planning: Patients were given time to complete a weekend plan of what they will do to promote wellness and sobriety over the weekend when they do not have the structure of group. Patients were encouraged to review progress on their treatment goals and were challenged to identify ways to work toward meeting them. Patients identified and discussed foreseeable barriers to success over the weekend and then developed a plan to overcome them. Patients reviewed their distress coping skills and social support network and discussed this with the group.       Patient Session Goals /  Objectives:    ?    Identified activities to engage in that promote balance in wellness  ?    Distinguished possible barriers to success over the weekend and created a plan to overcome them  ?    Listed distress coping skills and identified social support network to utilize if in crisis during the weekend        Patient Participation / Response:  Fully participated with the group by sharing personal reflections / insights and openly received / provided feedback with other participants.    Demonstrated understanding of topics discussed through group discussion and participation    Treatment Plan:  Patient has a current master individualized treatment plan.  See Epic treatment plan for more information.    Ildefonso Sellers

## 2022-05-06 NOTE — TELEPHONE ENCOUNTER
Pt stated she was unable to  depakote. Pharmacy had a question about dosing. They stated they wanted to know if the rx was take 1 tablet 3x daily or take 1 tab in the AM and 2 at bedtime. The correct dosing per chart review is 1 tab in AM and 2 at bedtime.     Old: Take 1 tablet (250 mg) by mouth 3 times daily 1 in the morning and 2 at bedtime - Oral  New: Take 1 tab in the morning and 2 tabs at bedtime - Oral    New Rx was made historical simple to update chart. Pharmacy was called and they updated their 5/2/2022 Rx to the new directions above. Refill was approved by pharmacy.

## 2022-05-09 ENCOUNTER — HOSPITAL ENCOUNTER (OUTPATIENT)
Dept: BEHAVIORAL HEALTH | Facility: CLINIC | Age: 47
Discharge: HOME | End: 2022-05-09
Attending: PSYCHIATRY & NEUROLOGY
Payer: COMMERCIAL

## 2022-05-09 DIAGNOSIS — F15.90 STIMULANT USE DISORDER: Primary | ICD-10-CM

## 2022-05-09 PROCEDURE — G0177 OPPS/PHP; TRAIN & EDUC SERV: HCPCS | Mod: GT,95

## 2022-05-09 PROCEDURE — 90853 GROUP PSYCHOTHERAPY: CPT | Mod: GT,95

## 2022-05-09 NOTE — GROUP NOTE
Process Group Note    PATIENT'S NAME: Kandy Yañez  MRN:   [id number]  :   1975  ACCT. NUMBER: [id number]  DATE OF SERVICE: 22  START TIME:  9:00 AM  END TIME:  9:50 AM  FACILITATOR: Nayeli Rockwell  TOPIC:  Process Group    Diagnoses:  Bipolar I Disorder Current or Most Recent Episode Manic, Mild   300.02 (F41.1) Generalized Anxiety Disorder.  4. Other Diagnoses that is relevant to services:   Substance-Related & Addictive Disorders Stimulant Use Disorder: Specify current severity:  Severe  304.20 (F14.20)Amphetamine type substance, and Severe, Cocaine, Specify if: in remission, Tobacco Use Disorder, Specify current severity:  305.1(F17.200) Moderate, per history PTSD      [facility] Day Treatment  TRACK: DDP and 1A combined    NUMBER OF PARTICIPANTS: 7 total                                      Service Modality:  Video Visit     Telemedicine Visit: The patient's condition can be safely assessed and treated via synchronous audio and visual telemedicine encounter.      Reason for Telemedicine Visit: Services only offered telehealth    Originating Site (Patient Location): Patient's home    Distant Site (Provider Location): Provider Remote Setting- Home Office    Consent:  The patient/guardian has verbally consented to: the potential risks and benefits of telemedicine (video visit) versus in person care; bill my insurance or make self-payment for services provided; and responsibility for payment of non-covered services.     Patient would like the video invitation sent by:  My Chart    Mode of Communication:  Video Conference via Medical Zoom    As the provider I attest to compliance with applicable laws and regulations related to telemedicine.                Data:    Session content: At the start of this group, patients were invited to check in by identifying themselves, describing their current emotional status, and identifying issues to address in this group.   Area(s) of  treatment focus addressed in this session included Symptom Management, Personal Safety, Develop / Improve Independent Living Skills and Develop Socialization / Interpersonal Relationship Skills.  Kandy introduced self to group with she/her pronouns.  She reported feeling tired today after starting new job, going off of diet, and stopping work outs.  She feels frustrated with work expectations re: assigned hours and being scheduled on wednesdays.  She plans to cope with finding meaning, prayer, and problem solving with group therapist around work schedule.  She reported feeling she has been responsive rather than reactive in difficult situations including with how she has been spending money.  She feels proud of being able to get mom gift for mothers day and show up in the moment and be present.  She denied safety concerns or substance use issues.  She is taking her meds.     Therapeutic Interventions/Treatment Strategies:  Psychotherapist offered support, feedback and validation and reinforced use of skills. Treatment modalities used include Motivational Interviewing, Cognitive Behavioral Therapy and Dialectical Behavioral Therapy. Interventions include Behavioral Activation: Explored how behaviors effect mood and interact with thoughts and feelings, Cognitive Restructuring:  Explored impact of ineffective thoughts / distortions on mood and activity and Coping Skills: Facilitated discussion on learning and applying radical acceptance skill and Facilitated understanding of  what factors may contribute to symptom relapse and skills plan to manage symptom relapse .    Assessment:    Patient response:   Patient responded to session by accepting feedback, listening, focusing on goals, being attentive and accepting support    Possible barriers to participation / learning include: and no barriers identified    Health Issues:   None reported       Substance Use Review:   Substance Use: No active concerns identified.    Mental  Status/Behavioral Observations  Appearance:   Appropriate   Eye Contact:   Good   Psychomotor Behavior: Normal   Attitude:   Cooperative   Orientation:   All  Speech   Rate / Production: Normal    Volume:  Normal   Mood:    Anxious  Normal  Affect:    Appropriate   Thought Content:   Rumination  Thought Form:  Coherent  Logical     Insight:    Good     Plan:     Safety Plan: No current safety concerns identified.  Recommended that patient call 911 or go to the local ED should there be a change in any of these risk factors.     Barriers to treatment: None identified    Patient Contracts (see media tab):  None    Substance Use: Not addressed in session     Continue or Discharge: Patient will continue in Adult Day Treatment (ADT)  as planned. Patient is likely to benefit from learning and using skills as they work toward the goals identified in their treatment plan.      Nayeli Rockwell  May 9, 2022

## 2022-05-09 NOTE — GROUP NOTE
Psychoeducation Group Note    PATIENT'S NAME: Kandy Yañez  MRN:   [id number]  :   1975  [id number]  DATE OF SERVICE: 22  START TIME: 11:00 AM  END TIME: 11:50 AM  FACILITATOR: Sandrita Hdz RN  TOPIC: MH Wellness Group: Mental Health Maintenance                                    Service Modality:  Video Visit     Telemedicine Visit: The patient's condition can be safely assessed and treated via synchronous audio and visual telemedicine encounter.      Reason for Telemedicine Visit:  covid19    Originating Site (Patient Location): Patient's home    Distant Site (Provider Location): Provider Remote Setting- Home Office    Consent:  The patient/guardian has verbally consented to: the potential risks and benefits of telemedicine (video visit) versus in person care; bill my insurance or make self-payment for services provided; and responsibility for payment of non-covered services.     Patient would like the video invitation sent by:  My Chart    Mode of Communication:  Video Conference via Medical Zoom    As the provider I attest to compliance with applicable laws and regulations related to telemedicine.          [facility] Adult Mental Health Day Treatment  TRACK: 1A + DDP1 merger    NUMBER OF PARTICIPANTS: 7    Summary of Group / Topics Discussed:  Mental Health Maintenance:  Stigma: In this group patients explored stigma surrounding a mental health diagnosis.  The group discussed the way stigma impacts their own life, and discussed strategies to reduce. The relationship between physical and mental health were also explored in the context of healthcare access, treatment, and support.    Patient Session Goals / Objectives:  ? Patients identified the importance of practicing emotional hygiene  ? Patients identified ways to decrease the  impact of stigma in their own life        Patient Participation / Response:  Fully participated with the group by sharing personal reflections /  insights and openly received / provided feedback with other participants.    Demonstrated understanding of topics discussed through group discussion and participation and Identified / Expressed personal readiness to practice skills    Treatment Plan:  Patient has a current master individualized treatment plan.  See Epic treatment plan for more information.    Sandrita Hdz RN

## 2022-05-09 NOTE — GROUP NOTE
Process Group Note    PATIENT'S NAME: Kandy Yañez  MRN:   [id number]  :   1975  ACCT. NUMBER: [id number]  DATE OF SERVICE: 22  START TIME:  9:00 AM  END TIME:  9:50 AM  FACILITATOR: Liliana Olson LPCC  TOPIC: MH Process Group    Diagnoses:  ***    [facility] Adult Dual Diagnosis Day Treatment  TRACK: 1    NUMBER OF PARTICIPANTS: 3                                      Service Modality:  Video Visit     Telemedicine Visit: The patient's condition can be safely assessed and treated via synchronous audio and visual telemedicine encounter.      Reason for Telemedicine Visit: Services only offered telehealth    Originating Site (Patient Location): Patient's home    Distant Site (Provider Location): Provider Remote Setting- Home Office    Consent:  The patient/guardian has verbally consented to: the potential risks and benefits of telemedicine (video visit) versus in person care; bill my insurance or make self-payment for services provided; and responsibility for payment of non-covered services.     Patient would like the video invitation sent by:  My Chart    Mode of Communication:  Video Conference via Medical Zoom    As the provider I attest to compliance with applicable laws and regulations related to telemedicine.                Data:    Session content: At the start of this group, patients were invited to check in by identifying themselves, describing their current emotional status, and identifying issues to address in this group.   Area(s) of treatment focus addressed in this session included {OP BEH ADULT MH AREA OF TREATMENT FOCUS:556741}.  ***    Therapeutic Interventions/Treatment Strategies:  {OP  THERAPEUTIC INTERVENTIONS/TREATMENT:847871}    Assessment:    Patient response:   Patient responded to session by {PATIENT RESPONSE:103220}    Possible barriers to participation / learning include: {POSSIBLE BARRIERS:533042}    Health Issues:   {YES / NO:307273}       Substance Use  "Review:   Substance Use: {YES / NO:004593}    Mental Status/Behavioral Observations  Appearance:   {Appearance:038344::\"Appropriate \"}  Eye Contact:   {Eye Contact:479773::\"Good \"}  Psychomotor Behavior: {Psychomotor Behavior:424391::\"Normal \"}  Attitude:   {Attitude:923847::\"Cooperative \"}  Orientation:   {Orientation:888520::\"All\"}  Speech   Rate / Production: {Speech Rate/Production:461113::\"Normal \"}   Volume:  {Speech Volume:806058::\"Normal \"}  Mood:    {Mood:816444::\"Normal\"}  Affect:    {Affect:689773::\"Appropriate \"}  Thought Content:   {Thought Content with Safety:260073}  Thought Form:  {Thought Form:383530::\"Coherent \",\"Logical \"}    Insight:    {Insight:205282::\"Good \"}    Plan:     Safety Plan: {SAFETY PLAN:038485}     Barriers to treatment: {Barriers to Treatment:986143}    Patient Contracts (see media tab):  {Patient Contracts:881168}    Substance Use: {SUBSTANCE USE ASSESSMENT/INTERVENTION:806993}     Continue or Discharge: {Continue or Discharge:878576}      Liliana Olson, [facility]  May 9, 2022  "

## 2022-05-09 NOTE — ADDENDUM NOTE
Encounter addended by: Sandrita Hzd RN on: 5/9/2022 4:17 PM   Actions taken: Charge Capture section accepted

## 2022-05-09 NOTE — GROUP NOTE
Psychotherapy Group Note    PATIENT'S NAME: Kandy Yañez  MRN:   [id number]  :   1975  ACCT. NUMBER: [id number]  DATE OF SERVICE: 22  START TIME: 10:00 AM  END TIME: 10:50 AM  FACILITATOR: Nayeli Rockwell  TOPIC: MH EBP Group: Self-Awareness  [facility] Adult Mental Health Day Treatment  TRACK: DDP and 1A combined    NUMBER OF PARTICIPANTS: 2                                      Service Modality:  Video Visit     Telemedicine Visit: The patient's condition can be safely assessed and treated via synchronous audio and visual telemedicine encounter.      Reason for Telemedicine Visit: Services only offered telehealth    Originating Site (Patient Location): Patient's home    Distant Site (Provider Location): Provider Remote Setting- Home Office    Consent:  The patient/guardian has verbally consented to: the potential risks and benefits of telemedicine (video visit) versus in person care; bill my insurance or make self-payment for services provided; and responsibility for payment of non-covered services.     Patient would like the video invitation sent by:  My Chart    Mode of Communication:  Video Conference via Medical Zoom    As the provider I attest to compliance with applicable laws and regulations related to telemedicine.          Summary of Group / Topics Discussed:  Self-Awareness: Values: Patients identified personal values by examining development of their current values and how their values influence their daily functioning and life choices. Patients explored the impact of their values on their thoughts, feelings, and actions. Patients discussed definition of personal values and how they develop and change over time. The goal is to help patients reconcile value conflicts and achieve balance and flexibility to improve mood and daily functioning.     Patient Session Goals / Objectives:    Examined development of values and impact of values on functioning    Identified and prioritized  important values related to current well-being     Identified strategies to change or enhance values to positively impact symptoms    Assisted patients to find ways to adapt functioning to better fit their values      Patient Participation / Response:  Fully participated with the group by sharing personal reflections / insights and openly received / provided feedback with other participants.    Demonstrated understanding of topics discussed through group discussion and participation, Demonstrated understanding of values, strengths, and challenges to learn about themselves and Identified / Expressed readiness to act intentionally, increase self-compassion, promote personal growth    Treatment Plan:  Patient has a current master individualized treatment plan.  See Epic treatment plan for more information.    Nayeli Rockwell

## 2022-05-10 ENCOUNTER — HOSPITAL ENCOUNTER (OUTPATIENT)
Dept: BEHAVIORAL HEALTH | Facility: CLINIC | Age: 47
Discharge: HOME | End: 2022-05-10
Attending: PSYCHIATRY & NEUROLOGY
Payer: COMMERCIAL

## 2022-05-10 DIAGNOSIS — F15.90 STIMULANT USE DISORDER: Primary | ICD-10-CM

## 2022-05-10 PROCEDURE — H0035 MH PARTIAL HOSP TX UNDER 24H: HCPCS | Mod: GT,95

## 2022-05-10 PROCEDURE — 90853 GROUP PSYCHOTHERAPY: CPT | Mod: GT,95

## 2022-05-10 NOTE — GROUP NOTE
Psychoeducation Group Note    PATIENT'S NAME: Kandy Yañez  MRN:   [id number]  :   1975  ACCT. NUMBER: [id number]  DATE OF SERVICE: 5/10/22  START TIME: 12:00 PM  END TIME: 12:50 PM  FACILITATOR: Ildefonso Sellers; Norberto Burnham RN  TOPIC:  Wellness Group: [facility] Adult Dual Diagnosis Day Treatment  TRACK: DDP2    NUMBER OF PARTICIPANTS: 8    Summary of Group / Topics Discussed:  Foundations [employer]: Sleep: Case study/sleep hygiene: Patients explored the connection between sleep and mental illness. Patients learned about how adequate sleep can improve health, productivity, wellness, quality of life, and safety.     Patient Session Goals / Objectives:  ? Demonstrated understanding of sleep hygiene practices and benefits of sleep  ? Identified sleep hygiene strategies to utilize     Described the connection between sleep disturbances and mental illness      Patient Participation / Response:  Fully participated with the group by sharing personal reflections / insights and openly received / provided feedback with other participants.    Demonstrated understanding of topics discussed through group discussion and participation    Treatment Plan:  Patient has a current master individualized treatment plan.  See Epic treatment plan for more information.    Ildefonso Sellers, LPCC, LADC

## 2022-05-10 NOTE — GROUP NOTE
Process Group Note    PATIENT'S NAME: Kandy Yañez  MRN:   [id number]  :   1975  ACCT. NUMBER: [id number]  DATE OF SERVICE: 5/10/22  START TIME: 10:00 AM  END TIME: 10:50 AM  FACILITATOR: Ildefonso Sellers; Naomy Thurston, [facility]  TOPIC:  Process Group    Diagnoses:  Bipolar I Disorder Current or Most Recent Episode Manic, Mild   300.02 (F41.1) Generalized Anxiety Disorder.  4. Other Diagnoses that is relevant to services:   Substance-Related & Addictive Disorders Stimulant Use Disorder: Specify current severity:  Severe  304.20 (F14.20)Amphetamine type substance, and Severe, Cocaine, Specify if: in remission, Tobacco Use Disorder, Specify current severity:  305.1(F17.200) Moderate, per history PTSD.       [facility] Adult Dual Diagnosis Day Treatment  TRACK: DDP2    NUMBER OF PARTICIPANTS: 8                                      Service Modality:  Video Visit     Telemedicine Visit: The patient's condition can be safely assessed and treated via synchronous audio and visual telemedicine encounter.      Reason for Telemedicine Visit: Services only offered telehealth    Originating Site (Patient Location): Patient's home    Distant Site (Provider Location): Provider Remote Setting- Home Office    Consent:  The patient/guardian has verbally consented to: the potential risks and benefits of telemedicine (video visit) versus in person care; bill my insurance or make self-payment for services provided; and responsibility for payment of non-covered services.     Patient would like the video invitation sent by:  My Chart    Mode of Communication:  Video Conference via Medical Zoom    As the provider I attest to compliance with applicable laws and regulations related to telemedicine.               Data:    Session content: At the start of this group, patients were invited to check in by identifying themselves, describing their current emotional status, and identifying issues to address in this group.   Area(s)  "of treatment focus addressed in this session included Symptom Management, Personal Safety, Abstinence/Relapse Prevention, Develop / Improve Independent Living Skills and Develop Socialization / Interpersonal Relationship Skills.    Client denied any substance use or safety concerns and endorsed taking medications as prescribed.  She states that she feels \"pressured\", \"like a spazz\", distracted, and happy .... \"Mostly positive\".  Her goal is to make a probation call and she notes timing to be a potential barrier.  She talked further about her new job at [employer] and advocating for herself to have full-time hours.   She says her skills are acceptance, practicing kary, praying, going to meetings, and mindful breathing.      Therapeutic Interventions/Treatment Strategies:  Psychotherapist offered support, feedback and validation and reinforced use of skills. Treatment modalities used include Motivational Interviewing, Cognitive Behavioral Therapy and Dialectical Behavioral Therapy. Interventions include Behavioral Activation: Reinforced benefits/challenges of change process through applying skills to replace unwanted behaviors, Cognitive Restructuring:  Explored impact of ineffective thoughts / distortions on mood and activity, Coping Skills: Addressed barriers to utilizing coping skills when in distress, Relapse Prevention: Facilitated understanding the importance of awareness of factors that contribute to relapse  and Mindfulness: Facilitated discussion of when/how to use mindfulness skills to benefit general health, mental health symptoms, and stressors.    Assessment:    Patient response:   Patient responded to session by accepting feedback, giving feedback, listening, focusing on goals, being attentive and accepting support    Possible barriers to participation / learning include: and no barriers identified    Health Issues:   None reported       Substance Use Review:   Substance Use: No active concerns " identified.    Mental Status/Behavioral Observations  Appearance:   Appropriate   Eye Contact:   Good   Psychomotor Behavior: Normal   Attitude:   Cooperative   Orientation:   All  Speech   Rate / Production: Normal    Volume:  Normal   Mood:    Normal  Affect:    Appropriate   Thought Content:   Clear  Thought Form:  Coherent  Logical     Insight:    Good     Plan:     Safety Plan: No current safety concerns identified.  Recommended that patient call 911 or go to the local ED should there be a change in any of these risk factors.     Barriers to treatment: None identified    Patient Contracts (see media tab):  None    Substance Use: Stage of Change: Action    Reviewed concerns related to health related substance abuse risk    Provided support and affirmation for steps taken towards sobriety      Continue or Discharge: Patient will continue in Adult Dual Disorder Program (DDP) as planned. Patient is likely to benefit from learning and using skills as they work toward the goals identified in their treatment plan.      Ildefonso Sellers, LPCC, LADC May 10, 2022

## 2022-05-10 NOTE — GROUP NOTE
Psychotherapy Group Note    PATIENT'S NAME: Kandy Yañez  MRN:   [id number]  :   1975  [id number]  DATE OF SERVICE: 5/10/22  START TIME: 11:00 AM  END TIME: 11:50 AM  FACILITATOR: Ildefonso Sellers; Naomy Thurston, [place]  TOPIC: MH EBP Group: Cognitive Restructuring  [facility] Adult Dual Diagnosis Day Treatment  TRACK: DDP2    NUMBER OF PARTICIPANTS: 8    Summary of Group / Topics Discussed:  Cognitive Restructuring: Core Beliefs: Patients received an overview of what a core belief is, and how they develop. Patients then began to identify their negative core beliefs. Patients worked to modify core beliefs with the goal of improved self-image and functioning.     Patient Session Goals / Objectives:    Familiarize self with the concept of core beliefs and how they develop.      Explore personal core beliefs (positive and negative)    Develop / advance recognition of the connection between negative thoughts and negative core beliefs.    Formulate new neutral/positive core beliefs               Patient Participation / Response:  Fully participated with the group by sharing personal reflections / insights and openly received / provided feedback with other participants.    Demonstrated understanding of topics discussed through group discussion and participation, Expressed understanding of the relationship between behaviors, thoughts, and feelings, Demonstrated knowledge of personal thought patterns and how they impact their mood and behavior. and Identified barriers to changing thought patterns    Treatment Plan:  Patient has a current master individualized treatment plan.  See Epic treatment plan for more information.    Ildefonso Sellers, LPCC, LADC

## 2022-05-11 NOTE — ADDENDUM NOTE
Encounter addended by: Ildefonso Sellers on: 5/11/2022 3:16 PM   Actions taken: Charge Capture section accepted

## 2022-05-12 ENCOUNTER — TELEPHONE (OUTPATIENT)
Dept: GASTROENTEROLOGY | Facility: CLINIC | Age: 47
End: 2022-05-12

## 2022-05-12 ENCOUNTER — LAB (OUTPATIENT)
Dept: LAB | Facility: CLINIC | Age: 47
End: 2022-05-12
Payer: COMMERCIAL

## 2022-05-12 ENCOUNTER — HOSPITAL ENCOUNTER (OUTPATIENT)
Dept: BEHAVIORAL HEALTH | Facility: CLINIC | Age: 47
Discharge: HOME | End: 2022-05-12
Attending: PSYCHIATRY & NEUROLOGY
Payer: COMMERCIAL

## 2022-05-12 ENCOUNTER — HOSPITAL ENCOUNTER (OUTPATIENT)
Facility: AMBULATORY SURGERY CENTER | Age: 47
End: 2022-05-12
Attending: SURGERY
Payer: COMMERCIAL

## 2022-05-12 DIAGNOSIS — F31.9 BIPOLAR I DISORDER (H): ICD-10-CM

## 2022-05-12 DIAGNOSIS — F15.90 STIMULANT USE DISORDER: Primary | ICD-10-CM

## 2022-05-12 LAB
ALBUMIN SERPL-MCNC: 3.6 G/DL (ref 3.4–5)
ALP SERPL-CCNC: 53 U/L (ref 40–150)
ALT SERPL W P-5'-P-CCNC: 25 U/L (ref 0–50)
AST SERPL W P-5'-P-CCNC: 12 U/L (ref 0–45)
BILIRUB DIRECT SERPL-MCNC: 0.2 MG/DL (ref 0–0.2)
BILIRUB SERPL-MCNC: 0.6 MG/DL (ref 0.2–1.3)
PROT SERPL-MCNC: 6.8 G/DL (ref 6.8–8.8)
VALPROATE SERPL-MCNC: 38 MG/L

## 2022-05-12 PROCEDURE — 80076 HEPATIC FUNCTION PANEL: CPT

## 2022-05-12 PROCEDURE — H0035 MH PARTIAL HOSP TX UNDER 24H: HCPCS | Mod: GT,95

## 2022-05-12 PROCEDURE — 36415 COLL VENOUS BLD VENIPUNCTURE: CPT

## 2022-05-12 PROCEDURE — 90853 GROUP PSYCHOTHERAPY: CPT | Mod: GT,95

## 2022-05-12 PROCEDURE — 90853 GROUP PSYCHOTHERAPY: CPT | Mod: GT,95 | Performed by: COUNSELOR

## 2022-05-12 PROCEDURE — 80164 ASSAY DIPROPYLACETIC ACD TOT: CPT

## 2022-05-12 NOTE — GROUP NOTE
Psychotherapy Group Note    PATIENT'S NAME: Kandy Yañez  MRN:   [id number]  :   1975  ACCT. NUMBER: [id number]  DATE OF SERVICE: 22  START TIME: 10:00 AM  END TIME: 10:50 AM  FACILITATOR: Liliana Olson LPCC  TOPIC: MH EBP Group: Cognitive Restructuring  [facility] Adult Dual Diagnosis Day Treatment  TRACK: 2    NUMBER OF PARTICIPANTS: 5                                      Service Modality:  Video Visit     Telemedicine Visit: The patient's condition can be safely assessed and treated via synchronous audio and visual telemedicine encounter.      Reason for Telemedicine Visit: Services only offered telehealth    Originating Site (Patient Location): Patient's home    Distant Site (Provider Location): Provider Remote Setting- Home Office    Consent:  The patient/guardian has verbally consented to: the potential risks and benefits of telemedicine (video visit) versus in person care; bill my insurance or make self-payment for services provided; and responsibility for payment of non-covered services.     Patient would like the video invitation sent by:  My Chart    Mode of Communication:  Video Conference via Medical Zoom    As the provider I attest to compliance with applicable laws and regulations related to telemedicine.          Summary of Group / Topics Discussed:  Cognitive Restructuring: Perfectionism: Patients discussed and reflected on what perfectionism is, how it develops, and how it impacts functioning. Ways to challenge perfectionism were explored and discussed by the group, with the goal of challenging perfectionistic thinking and improving functioning.    Patient Session Goals / Objectives:    Understand the concept of perfectionistic thoughts and how they develop.     Increase recognition of the connection between perfectionistic thinking and symptoms.    Explore and practice new ways to challenge the perfectionistic stance and replace with reasonable expectations of self and  others.    Begin to formulate realistic personal expectations and goals.               Patient Participation / Response:  Fully participated with the group by sharing personal reflections / insights and openly received / provided feedback with other participants.    Demonstrated understanding of topics discussed through group discussion and participation, Expressed understanding of the relationship between behaviors, thoughts, and feelings and Demonstrated knowledge of personal thought patterns and how they impact their mood and behavior.    Treatment Plan:  Patient has a current master individualized treatment plan.  See Epic treatment plan for more information.    Liliana Olson, [facility]

## 2022-05-12 NOTE — ADDENDUM NOTE
Encounter addended by: lIdefonso Sellers on: 5/12/2022 5:47 PM   Actions taken: Charge Capture section accepted

## 2022-05-12 NOTE — PROGRESS NOTES
Adult Dual Disorder Program:   Acknowledgement of Current Treatment Plan     I have reviewed my treatment plan with my therapist on 5/12/22.   I agree with the plan as it is written in the electronic health record.    Name:                  Signature:  Kandy POLLOCK Allisonghassan       Unavailable to sign due to COVID-19     FOREIGN Will, [facility]  Psychotherapist   FOREIGN Anna on 5/12/2022 at 12:40 PM       Sriram Hines MD  Psychiatrist/Medical Director Sriram Hines MD on 5/12/2022 at 1:56 PM

## 2022-05-12 NOTE — GROUP NOTE
Process Group Note    PATIENT'S NAME: Kandy Yañez  MRN:   [id number]  :   1975  ACCT. NUMBER: [id number]  DATE OF SERVICE: 22  START TIME: 11:00 AM  END TIME: 11:50 AM  FACILITATOR: Ildefonso Sellers; Naomy Thurston, [facility]  TOPIC:  Process Group    Diagnoses:  Bipolar I Disorder Current or Most Recent Episode Manic, Mild   300.02 (F41.1) Generalized Anxiety Disorder.  4. Other Diagnoses that is relevant to services:   Substance-Related & Addictive Disorders Stimulant Use Disorder: Specify current severity:  Severe  304.20 (F14.20)Amphetamine type substance, and Severe, Cocaine, Specify if: in remission, Tobacco Use Disorder, Specify current severity:  305.1(F17.200) Moderate, per history PTSD.       [facility] Adult Dual Diagnosis Day Treatment  TRACK: DDP2    NUMBER OF PARTICIPANTS: 5                                      Service Modality:  Video Visit     Telemedicine Visit: The patient's condition can be safely assessed and treated via synchronous audio and visual telemedicine encounter.      Reason for Telemedicine Visit: Services only offered telehealth    Originating Site (Patient Location): Patient's home    Distant Site (Provider Location): Provider Remote Setting- Home Office    Consent:  The patient/guardian has verbally consented to: the potential risks and benefits of telemedicine (video visit) versus in person care; bill my insurance or make self-payment for services provided; and responsibility for payment of non-covered services.     Patient would like the video invitation sent by:  My Chart    Mode of Communication:  Video Conference via Medical Zoom    As the provider I attest to compliance with applicable laws and regulations related to telemedicine.                Data:    Session content: At the start of this group, patients were invited to check in by identifying themselves, describing their current emotional status, and identifying issues to address in this group.   Area(s)  of treatment focus addressed in this session included Symptom Management, Personal Safety, Abstinence/Relapse Prevention, Develop / Improve Independent Living Skills and Develop Socialization / Interpersonal Relationship Skills.    Client states she has mixed feelings today and that she feels sensitive while also feeling happy to have the day off from work.  She adds that she hopes to make several phone calls today and that a barrier to this goal could be distractions.  She adds that she put her phone on do not disturb and scheduled her day with intentional structure.  She denies safety concerns or substance use and endorses taking medications as prescribed.  She says that she feels grateful to be managing money well and to have some left in the bank.      Therapeutic Interventions/Treatment Strategies:  Psychotherapist offered support, feedback and validation and reinforced use of skills. Treatment modalities used include Motivational Interviewing, Cognitive Behavioral Therapy and Dialectical Behavioral Therapy. Interventions include Behavioral Activation: Reinforced benefits/challenges of change process through applying skills to replace unwanted behaviors, Cognitive Restructuring:  Explored impact of ineffective thoughts / distortions on mood and activity, Coping Skills: Reviewed patients current calming practices and discussed a more formal way of practicing and accessing skills and Relapse Prevention: Facilitated understanding the importance of awareness of factors that contribute to relapse .    Assessment:    Patient response:   Patient responded to session by accepting feedback, giving feedback, listening, focusing on goals, being attentive and accepting support    Possible barriers to participation / learning include: and no barriers identified    Health Issues:   None reported       Substance Use Review:   Substance Use: No active concerns identified.    Mental Status/Behavioral  Observations  Appearance:   Appropriate   Eye Contact:   Good   Psychomotor Behavior: Normal   Attitude:   Cooperative   Orientation:   All  Speech   Rate / Production: Normal    Volume:  Normal   Mood:    Normal  Affect:    Appropriate   Thought Content:   Clear  Thought Form:  Coherent  Logical     Insight:    Good     Plan:     Safety Plan: No current safety concerns identified.  Recommended that patient call 911 or go to the local ED should there be a change in any of these risk factors.     Barriers to treatment: None identified    Patient Contracts (see media tab):  None    Substance Use: Not addressed in session     Continue or Discharge: Patient will continue in Adult Dual Disorder Program (DDP) as planned. Patient is likely to benefit from learning and using skills as they work toward the goals identified in their treatment plan.      Ildefonso Sellers, [facility], LADC May 12, 2022

## 2022-05-12 NOTE — GROUP NOTE
Psychotherapy Group Note    PATIENT'S NAME: Kandy Yañez  MRN:   [id number]  :   1975  [id number]  DATE OF SERVICE: 22  START TIME: 12:00 PM  END TIME: 12:50 PM  FACILITATOR: Ildefonso Sellers; Naomy Thurston, FERNANDA  TOPIC: MH EBP Group: Cognitive Restructuring  [facility] Adult Dual Diagnosis Day Treatment  TRACK: DDP2    NUMBER OF PARTICIPANTS: 6                                      Service Modality:  Video Visit     Telemedicine Visit: The patient's condition can be safely assessed and treated via synchronous audio and visual telemedicine encounter.      Reason for Telemedicine Visit: Services only offered telehealth    Originating Site (Patient Location): Patient's home    Distant Site (Provider Location): Provider Remote Setting- Home Office    Consent:  The patient/guardian has verbally consented to: the potential risks and benefits of telemedicine (video visit) versus in person care; bill my insurance or make self-payment for services provided; and responsibility for payment of non-covered services.     Patient would like the video invitation sent by:  My Chart    Mode of Communication:  Video Conference via Medical Zoom      As the provider I attest to compliance with applicable laws and regulations related to telemedicine.          Summary of Group / Topics Discussed:  Cognitive Restructuring: Core Beliefs: Patients received an overview of what a core belief is, and how they develop. Patients then began to identify their negative core beliefs. Patients worked to modify core beliefs with the goal of improved self-image and functioning.     Patient Session Goals / Objectives:    Familiarize self with the concept of core beliefs and how they develop.      Explore personal core beliefs (positive and negative)    Develop / advance recognition of the connection between negative thoughts and negative core beliefs.    Formulate new neutral/positive core beliefs               Patient  Participation / Response:  Fully participated with the group by sharing personal reflections / insights and openly received / provided feedback with other participants.    Demonstrated understanding of topics discussed through group discussion and participation    Treatment Plan:  Patient has a current master individualized treatment plan.  See Epic treatment plan for more information.    Ildefonso Sellers, [facility], [facility] 5/12/2022

## 2022-05-12 NOTE — TELEPHONE ENCOUNTER
Screening Questions  BlueKIND OF PREP RedLOCATION [review exclusion criteria] GreenSEDATION TYPE  1. Have you had a positive covid test in the last 90 days?  Y    2. Do you have a legal guardian or medical Power of ?  Are you able to give consent for your medical care?Y (Sedation review/consideration needed)    3. Are you active on mychart? Y    4. What insurance is in the chart? [employer]     3.   Ordering/Referring Provider: Agustin Prado,     4. BMI 26.9 [BMI OVER 40-EXTENDED PREP]  If greater than 40 review exclusion criteria [PAC APPT IF @ [facility]]      5.  Respiratory Screening :  [If yes to any of the following HOSPITAL setting only]     Do you use daily home oxygen? N    Do you have mod to severe Obstructive Sleep Apnea? N  [OKAY @ [facility] SH PH RI]   Do you have Pulmonary Hypertension? N     Do you have UNCONTROLLED asthma? N        6.   Have you had a heart or lung transplant? N      7.   Are you currently on dialysis? N [ If yes, G-PREP & HOSPITAL setting only]     8.   Do you have chronic kidney disease? N [ If yes, G-PREP ]    9.   Have you had a stroke or Transient ischemic attack (TIA - aka  mini stroke ) within 6 months?  N (If yes, please review exclusion criteria)    10.   In the past 6 months, have you had any heart related issues including cardiomyopathy or heart attack? N           If yes, did it require cardiac stenting or other implantable device? N      11.   Do you have any implantable devices in your body (pacemaker, defib, LVAD)? N (If yes, please review exclusion criteria)    12.   Do you take nitroglycerin? N           If yes, how often? N  (if yes, HOSPITAL setting ONLY)    13.   Are you currently taking any blood thinners? N           [IF YES, INFORM PATIENT TO FOLLOW UP W/ ORDERING PROVIDER FOR BRIDGING INSTRUCTIONS]     14.   Do you have a diagnosis of diabetes? N   [ If yes, G-PREP ]    15.   [FEMALES] Are you currently pregnant? N    If yes, how many weeks?  N    16.   Are you taking any prescription pain medications on a routine schedule?  N  [ If yes, EXTENDED PREP.] [If yes, MAC]    17.   Do you have any chemical dependencies such as alcohol, street drugs, or methadone?  N MARCH CLEAN DATE [If yes, MAC]    18.   Do you have any history of post-traumatic stress syndrome, severe anxiety or history of psychosis?  Y  [If yes, MAC]    19.   Do you transfer independently?  Y    20.  On a regular basis do you go 3-5 days between bowel movements? N   [ If yes, EXTENDED PREP.]    21.   Preferred LOCAL Pharmacy for Pre Prescription   [id number] - [place], [place] - [address]      Scheduling Details      Caller :  Kandy   (Please ask for phone number if not scheduled by patient)    Type of Procedure Scheduled: Lower Endoscopy [Colonoscopy]  Which Colonoscopy Prep was Sent?: MPREP  AZALIA CF PATIENTS & GROEN'S PATIENTS NEEDS EXTENDED PREP  Surgeon: CHLOÉ  Date of Procedure: 6/21  Location:     Sedation Type: MAC    Conscious Sedation- Needs  for 6 hours after the procedure  MAC/General-Needs  for 24 hours after procedure    Pre-op Required at [facility], [place], [place] and OR for MAC sedation: Y  (advise patient they will need a pre-op prior to procedure -)      Informed patient they will need an adult  Y  Cannot take any type of public or medical transportation alone    Pre-Pro[facility] or Externally: 6/17    Confirmed Nurse will call to complete assessment Y    Additional comments:

## 2022-05-13 ENCOUNTER — MYC MEDICAL ADVICE (OUTPATIENT)
Dept: BEHAVIORAL HEALTH | Facility: CLINIC | Age: 47
End: 2022-05-13
Payer: COMMERCIAL

## 2022-05-13 ENCOUNTER — HOSPITAL ENCOUNTER (OUTPATIENT)
Dept: BEHAVIORAL HEALTH | Facility: CLINIC | Age: 47
Discharge: HOME | End: 2022-05-13
Attending: PSYCHIATRY & NEUROLOGY
Payer: COMMERCIAL

## 2022-05-13 DIAGNOSIS — F15.90 STIMULANT USE DISORDER: Primary | ICD-10-CM

## 2022-05-13 PROCEDURE — 90853 GROUP PSYCHOTHERAPY: CPT | Mod: GT,95

## 2022-05-13 NOTE — GROUP NOTE
Psychoeducation Group Note    PATIENT'S NAME: Kandy Yañez  MRN:   [id number]  :   1975  ACCT. NUMBER: [id number]  DATE OF SERVICE: 22  START TIME: 11:00 AM  END TIME: 11:50 AM  FACILITATOR: Naomy Thurston, LGSW; Virginia Lino OTR/L; Christel Cisneros, [facility]  TOPIC:  Life Skills Group: Life Skills  [facility] Adult Dual Diagnosis Day Treatment  TRACK: 1&2     NUMBER OF PARTICIPANTS: 6    Summary of Group / Topics Discussed:  Life Skills:  Patients played association games and learned about how to exercise their brain and engage socially.     Patient Session Goals / Objectives:  ? Patients engaged in several association games and learned how to utilize these resources virtually  ? Patients learned about the importance of leisure activities.   ? Patients made goals to engage in leisure activities     Service Modality:  Video Visit     Telemedicine Visit: The patient's condition can be safely assessed and treated via synchronous audio and visual telemedicine encounter.      Reason for Telemedicine Visit: Services only offered telehealth    Originating Site (Patient Location): Patient's home    Distant Site (Provider Location): Provider Remote Setting- Home Office    Consent:  The patient/guardian has verbally consented to: the potential risks and benefits of telemedicine (video visit) versus in person care; bill my insurance or make self-payment for services provided; and responsibility for payment of non-covered services.     Patient would like the video invitation sent by:  My Chart    Mode of Communication:  Video Conference via Medical Zoom    As the provider I attest to compliance with applicable laws and regulations related to telemedicine.                   Patient Participation / Response:  Fully participated with the group by sharing personal reflections / insights and openly received / provided feedback with other participants.    Verbalized understanding of content    Treatment  Plan:  Patient has a current master individualized treatment plan.  See Epic treatment plan for more information.    Naomy Thurston LGSW

## 2022-05-13 NOTE — ADDENDUM NOTE
Encounter addended by: Naomy Thurston LGSW on: 5/13/2022 2:51 PM   Actions taken: Clinical Note Signed, Charge Capture section accepted

## 2022-05-13 NOTE — GROUP NOTE
Psychoeducation Group Note    PATIENT'S NAME: Kandy Yañez  MRN:   [id number]  :   1975  [id number]  DATE OF SERVICE: 22  START TIME: 12:00 PM  END TIME: 12:50 PM  FACILITATOR: Ildefonso Sellers; Naomy Thurston LGSW  TOPIC:  Wellness Group: Health Maintenance  [facility] Adult Dual Diagnosis Day Treatment  TRACK: DDP2    NUMBER OF PARTICIPANTS: 6                                      Service Modality:  Video Visit     Telemedicine Visit: The patient's condition can be safely assessed and treated via synchronous audio and visual telemedicine encounter.      Reason for Telemedicine Visit: Services only offered telehealth    Originating Site (Patient Location): Patient's home    Distant Site (Provider Location): Provider Remote Setting- Home Office    Consent:  The patient/guardian has verbally consented to: the potential risks and benefits of telemedicine (video visit) versus in person care; bill my insurance or make self-payment for services provided; and responsibility for payment of non-covered services.     Patient would like the video invitation sent by:  My Chart    Mode of Communication:  Video Conference via Medical Zoom    As the provider I attest to compliance with applicable laws and regulations related to telemedicine.          Summary of Group / Topics Discussed:  Health Maintenance: Weekend planning: Patients were given time to complete a weekend plan of what they will do to promote wellness and sobriety over the weekend when they do not have the structure of group. Patients were encouraged to review progress on their treatment goals and were challenged to identify ways to work toward meeting them. Patients identified and discussed foreseeable barriers to success over the weekend and then developed a plan to overcome them. Patients reviewed their distress coping skills and social support network and discussed this with the group.       Patient Session Goals /  Objectives:    ?    Identified activities to engage in that promote balance in wellness  ?    Distinguished possible barriers to success over the weekend and created a plan to overcome them  ?    Listed distress coping skills and identified social support network to utilize if in crisis during the weekend        Patient Participation / Response:  Fully participated with the group by sharing personal reflections / insights and openly received / provided feedback with other participants.    Demonstrated understanding of topics discussed through group discussion and participation, Identified / Expressed personal readiness to practice skills and Verbalized understanding of health maintenance topic    Treatment Plan:  Patient has a current master individualized treatment plan.  See Epic treatment plan for more information.    Ildefonso Sellers, LPCC, LADC

## 2022-05-13 NOTE — GROUP NOTE
Process Group Note    PATIENT'S NAME: Kandy Yañez  MRN:   [id number]  :   1975  ACCT. NUMBER: [id number]  DATE OF SERVICE: 22  START TIME: 10:00 AM  END TIME: 10:50 AM  FACILITATOR: Ildefonso Sellers; Naomy Thurston, [facility]  TOPIC:  Process Group    Diagnoses:  Bipolar I Disorder Current or Most Recent Episode Manic, Mild   300.02 (F41.1) Generalized Anxiety Disorder.  4. Other Diagnoses that is relevant to services:   Substance-Related & Addictive Disorders Stimulant Use Disorder: Specify current severity:  Severe  304.20 (F14.20)Amphetamine type substance, and Severe, Cocaine, Specify if: in remission, Tobacco Use Disorder, Specify current severity:  305.1(F17.200) Moderate, per history PTSD.       [facility] Adult Dual Diagnosis Day Treatment  TRACK: DDP2    NUMBER OF PARTICIPANTS: 7                                      Service Modality:  Video Visit     Telemedicine Visit: The patient's condition can be safely assessed and treated via synchronous audio and visual telemedicine encounter.      Reason for Telemedicine Visit: Services only offered telehealth    Originating Site (Patient Location): Patient's home    Distant Site (Provider Location): Provider Remote Setting- Home Office    Consent:  The patient/guardian has verbally consented to: the potential risks and benefits of telemedicine (video visit) versus in person care; bill my insurance or make self-payment for services provided; and responsibility for payment of non-covered services.     Patient would like the video invitation sent by:  My Chart    Mode of Communication:  Video Conference via Medical Zoom    As the provider I attest to compliance with applicable laws and regulations related to telemedicine.                Data:    Session content: At the start of this group, patients were invited to check in by identifying themselves, describing their current emotional status, and identifying issues to address in this group.   Area(s)  of treatment focus addressed in this session included Symptom Management, Personal Safety, Abstinence/Relapse Prevention and Develop / Improve Independent Living Skills.    Kandy presents tearfully and states,  I m struggling today,  and rageful and hurt.  Adds that talking about treatment goals and medication adjustment and that she feels depressed and  scared  and that she feels compelled to lose her temper with people.  Denies safety concerns and substance use. Goal is to try to get through the day.   Went to breakout with co-therapist to collaborate on a plan.  Kandy responded well to instruction in grounding and paced breathing as she seemed panicked and quite tearful.  She completed her check-in and met with co-therapist outside of group to assess her condition and to participate in de-escalation which was successful.    Therapeutic Interventions/Treatment Strategies:  Psychotherapist offered support, feedback and validation, provided redirection and reinforced use of skills. Treatment modalities used include Motivational Interviewing, Cognitive Behavioral Therapy and Dialectical Behavioral Therapy. Interventions include Behavioral Activation: Reinforced benefits/challenges of change process through applying skills to replace unwanted behaviors, Cognitive Restructuring:  Assisted patient in formulating new neutral/positive alternatives to challenge less helpful / ineffective thoughts, Coping Skills: Discussed use of self-soothe skills to decrease distress in the body, Relapse Prevention: Facilitated understanding the importance of awareness of factors that contribute to relapse  and Emotions Management:  Discussed barriers to emotional regulation.    Assessment:    Patient response:   Patient responded to session by accepting feedback, giving feedback, listening, focusing on goals, being attentive and accepting support    Possible barriers to participation / learning include: and no barriers identified    Health  Issues:   None reported       Substance Use Review:   Substance Use: No active concerns identified.    Mental Status/Behavioral Observations  Appearance:   Appropriate   Eye Contact:   Good   Psychomotor Behavior: Normal   Attitude:   Cooperative   Orientation:   All  Speech   Rate / Production: Emotional Normal    Volume:  Normal   Mood:    Anxious  Normal Sad  Panicked  Affect:    Appropriate   Thought Content:   Clear  Thought Form:  Coherent  Logical     Insight:    Good     Plan:     Safety Plan: No current safety concerns identified.  Recommended that patient call 911 or go to the local ED should there be a change in any of these risk factors.     Barriers to treatment: None identified    Patient Contracts (see media tab):  None    Substance Use: Provided encouragement towards sobriety    Provided support and affirmation for steps taken towards sobriety      Continue or Discharge: Patient will continue in Adult Dual Disorder Program (DDP) as planned. Patient is likely to benefit from learning and using skills as they work toward the goals identified in their treatment plan.      Ildefonso Sellers, [facility], LADC May 13, 2022

## 2022-05-16 ENCOUNTER — HOSPITAL ENCOUNTER (OUTPATIENT)
Dept: BEHAVIORAL HEALTH | Facility: CLINIC | Age: 47
Discharge: HOME | End: 2022-05-16
Attending: PSYCHIATRY & NEUROLOGY
Payer: COMMERCIAL

## 2022-05-16 DIAGNOSIS — F15.90 STIMULANT USE DISORDER: Primary | ICD-10-CM

## 2022-05-16 PROCEDURE — 90853 GROUP PSYCHOTHERAPY: CPT | Mod: GT,95 | Performed by: COUNSELOR

## 2022-05-16 PROCEDURE — 90853 GROUP PSYCHOTHERAPY: CPT | Mod: GT,95

## 2022-05-16 NOTE — GROUP NOTE
Process Group Note    PATIENT'S NAME: Kandy Yañez  MRN:   [id number]  :   1975  ACCT. NUMBER: [id number]  DATE OF SERVICE: 22  START TIME: 10:00 AM  END TIME: 10:50 AM  FACILITATOR: Ildefonso Sellers  TOPIC:  Process Group    Diagnoses:  Bipolar I Disorder Current or Most Recent Episode Manic, Mild   300.02 (F41.1) Generalized Anxiety Disorder.  4. Other Diagnoses that is relevant to services:   Substance-Related & Addictive Disorders Stimulant Use Disorder: Specify current severity:  Severe  304.20 (F14.20)Amphetamine type substance, and Severe, Cocaine, Specify if: in remission, Tobacco Use Disorder, Specify current severity:  305.1(F17.200) Moderate, per history PTSD.       [facility] Adult Dual Diagnosis Day Treatment  TRACK: DDP2    NUMBER OF PARTICIPANTS: 4                                      Service Modality:  Video Visit     Telemedicine Visit: The patient's condition can be safely assessed and treated via synchronous audio and visual telemedicine encounter.      Reason for Telemedicine Visit: Services only offered telehealth    Originating Site (Patient Location): Patient's home    Distant Site (Provider Location): Provider Remote Setting- Home Office    Consent:  The patient/guardian has verbally consented to: the potential risks and benefits of telemedicine (video visit) versus in person care; bill my insurance or make self-payment for services provided; and responsibility for payment of non-covered services.     Patient would like the video invitation sent by:  My Chart    Mode of Communication:  Video Conference via Medical Zoom    As the provider I attest to compliance with applicable laws and regulations related to telemedicine.                Data:    Session content: At the start of this group, patients were invited to check in by identifying themselves, describing their current emotional status, and identifying issues to address in this group.   Area(s) of treatment focus  addressed in this session included Symptom Management, Personal Safety, Abstinence/Relapse Prevention, Develop / Improve Independent Living Skills and Develop Socialization / Interpersonal Relationship Skills.    No safety concerns reported, denies substance use, taking medications as prescribed.  Feeling  all over the place  and  unstable  and  called into work because the pain in my hip  and will be going to get care for this today.  Her goal is to get her haircut---barrier could be anxiety and feeling irritable.  Her skills are assertive communication.  Feels grateful for getting out of bed and making it to group.    Therapeutic Interventions/Treatment Strategies:  Psychotherapist offered support, feedback and validation and reinforced use of skills. Treatment modalities used include Motivational Interviewing, Cognitive Behavioral Therapy and Dialectical Behavioral Therapy. Interventions include Behavioral Activation: Explored how behaviors effect mood and interact with thoughts and feelings, Cognitive Restructuring:  Assisted patient in identifying new neutral/positive core beliefs, Coping Skills: Addressed barriers to utilizing coping skills when in distress, Relapse Prevention: Coached on skills to manage factors that contribute to relapse and Relationship Skills: Assisted patients in implementing more effective communication skills in their relationships and Encouraged development and maintenance  of healthy boundaries.    Assessment:    Patient response:   Patient responded to session by accepting feedback, giving feedback, listening, focusing on goals, being attentive and accepting support    Possible barriers to participation / learning include: and no barriers identified    Health Issues:   None reported       Substance Use Review:   Substance Use: No active concerns identified.    Mental Status/Behavioral Observations  Appearance:   Appropriate   Eye Contact:   Good   Psychomotor Behavior: Normal    Attitude:   Cooperative   Orientation:   All  Speech   Rate / Production: Normal    Volume:  Normal   Mood:    Normal  Affect:    Appropriate   Thought Content:   Clear  Thought Form:  Coherent  Logical     Insight:    Good     Plan:     Safety Plan: No current safety concerns identified.  Recommended that patient call 911 or go to the local ED should there be a change in any of these risk factors.     Barriers to treatment: None identified    Patient Contracts (see media tab):  None    Substance Use: Provided support and affirmation for steps taken towards sobriety      Continue or Discharge: Patient will continue in Adult Dual Disorder Program (DDP) as planned. Patient is likely to benefit from learning and using skills as they work toward the goals identified in their treatment plan.      Ildefonso Sellers, [facility], LADC May 16, 2022

## 2022-05-16 NOTE — GROUP NOTE
Psychoeducation Group Note    PATIENT'S NAME: Kandy Yañez  MRN:   [id number]  :   1975  ACCT. NUMBER: [id number]  DATE OF SERVICE: 22  START TIME: 11:00 AM  END TIME: 11:50 AM  FACILITATOR: Ildefonso Sellers  TOPIC:  Wellness Group: [facility] Adult Dual Diagnosis Day Treatment  TRACK: DDP2    NUMBER OF PARTICIPANTS: 4    Summary of Group / Topics Discussed:  Foundations of Health: Nutrition: MICD nutrition: Patients were provided education on the role of nutrition in the body and all of the functions that nutrition influences including energy, body structure and bodily function as overarching categories. Select macronutrients and micronutrients and their important roles and functions were also reviewed. Chemical and substance use disrupt how we eat, how our organs behave, and what our body does with the food that we do eat. Patients were educated on the effects that chemicals have on nutritional status and ways in which nutrition can be promoted while in recovery.      Patient Session Goals / Objectives:  ? Identified hunger as a factor that can increase risk for chemical/substance relapse  ? Described the role of macronutrients and micronutrients in the body  ? Explained the effects of chemicals/substances on nutrition  ? Listed strategies for promoting balanced nutrition to promote wellness and recovery      Patient Participation / Response:  Fully participated with the group by sharing personal reflections / insights and openly received / provided feedback with other participants.    Demonstrated understanding of topics discussed through group discussion and participation    Treatment Plan:  Patient has a current master individualized treatment plan.  See Epic treatment plan for more information.    Ildefonso Sellers, LPCC, LADC

## 2022-05-16 NOTE — GROUP NOTE
Psychotherapy Group Note    PATIENT'S NAME: Kandy Yañez  MRN:   [id number]  :   1975  [id number]  DATE OF SERVICE: 22  START TIME: 12:00 PM  END TIME: 12:50 PM  FACILITATOR: Liliana Olson LPCC  TOPIC:  EBP Group: Behavioral Activation  [facility] Adult Dual Diagnosis Day Treatment  TRACK: 2    NUMBER OF PARTICIPANTS: 4                                      Service Modality:  Video Visit     Telemedicine Visit: The patient's condition can be safely assessed and treated via synchronous audio and visual telemedicine encounter.      Reason for Telemedicine Visit: Services only offered telehealth    Originating Site (Patient Location): Patient's home    Distant Site (Provider Location): Provider Remote Setting- Home Office    Consent:  The patient/guardian has verbally consented to: the potential risks and benefits of telemedicine (video visit) versus in person care; bill my insurance or make self-payment for services provided; and responsibility for payment of non-covered services.     Patient would like the video invitation sent by:  My Chart    Mode of Communication:  Video Conference via Medical Zoom    As the provider I attest to compliance with applicable laws and regulations related to telemedicine.          Summary of Group / Topics Discussed:  Behavioral Activation: [place] Ahead: {Patients identified situations that prompt unwanted and unhelpful emotions / thoughts / behaviors.   Patients discussed how to problem solve by proactively using coping skills in potentially difficult situations. Components included describing the situation, brainstorming coping skills, imagining how scenario can/will unfold, rehearsing the action plan, and practicing relaxation to follow.  Patients practiced using these skills to reduce symptom distress and increase effective coping  behaviors.      Patient Session Goals / Objectives:    Identify difficult situation(s), and gain proficiency with  alternative behaviors / skills to problem solve.    Increase confidence using coping skills through group practice in session.    Receive and provide feedback regarding skill development.    Apply coping skills in daily life situations.      Patient Participation / Response:  Fully participated with the group by sharing personal reflections / insights and openly received / provided feedback with other participants.    Demonstrated understanding of topics discussed through group discussion and participation, Expressed understanding of the relationship between behaviors, thoughts, and feelings and Shared experiences and challenges with making behavioral changes    Treatment Plan:  Patient has a current master individualized treatment plan.  See Epic treatment plan for more information.    Liliana lOson, [facility]

## 2022-05-18 ENCOUNTER — HOSPITAL ENCOUNTER (OUTPATIENT)
Dept: BEHAVIORAL HEALTH | Facility: CLINIC | Age: 47
Discharge: HOME | End: 2022-05-18
Attending: PSYCHIATRY & NEUROLOGY
Payer: COMMERCIAL

## 2022-05-18 PROCEDURE — 90853 GROUP PSYCHOTHERAPY: CPT | Mod: GT,95 | Performed by: COUNSELOR

## 2022-05-18 PROCEDURE — G0177 OPPS/PHP; TRAIN & EDUC SERV: HCPCS | Mod: GT,95

## 2022-05-18 NOTE — GROUP NOTE
Process Group Note    PATIENT'S NAME: Kandy Yañez  MRN:   [id number]  :   1975  ACCT. NUMBER: [id number]  DATE OF SERVICE: 22  START TIME:  9:00 AM  END TIME:  9:50 AM  FACILITATOR: Liliana Olson [facility]  TOPIC:  Process Group    Diagnoses:  Bipolar I Disorder Current or Most Recent Episode Manic, Mild   300.02 (F41.1) Generalized Anxiety Disorder.  4. Other Diagnoses that is relevant to services:   Substance-Related & Addictive Disorders Stimulant Use Disorder: Specify current severity:  Severe  304.20 (F14.20)Amphetamine type substance, and Severe, Cocaine, Specify if: in remission, Tobacco Use Disorder, Specify current severity:  305.1(F17.200) Moderate, per history PTSD.       [facility] Adult Dual Diagnosis Day Treatment  TRACK: 1    NUMBER OF PARTICIPANTS: 3                                      Service Modality:  Video Visit     Telemedicine Visit: The patient's condition can be safely assessed and treated via synchronous audio and visual telemedicine encounter.      Reason for Telemedicine Visit: Services only offered telehealth    Originating Site (Patient Location): Patient's home    Distant Site (Provider Location): Provider Remote Setting- Home Office    Consent:  The patient/guardian has verbally consented to: the potential risks and benefits of telemedicine (video visit) versus in person care; bill my insurance or make self-payment for services provided; and responsibility for payment of non-covered services.     Patient would like the video invitation sent by:  My Chart    Mode of Communication:  Video Conference via Medical Zoom    As the provider I attest to compliance with applicable laws and regulations related to telemedicine.                Data:    Session content: At the start of this group, patients were invited to check in by identifying themselves, describing their current emotional status, and identifying issues to address in this group.   Area(s) of treatment focus  addressed in this session included Symptom Management, Personal Safety and Abstinence/Relapse Prevention.    Kandy reported feeling overwhelmed today.  Her goal is to get to an AA meeting today.  She reported feeling frustrated and overwhelmed with how much she has her plate and has had to call into work the past 2 days due to physical pain.  She reported that her ex called her yesterday, which got her upset as well.     Therapeutic Interventions/Treatment Strategies:  Psychotherapist offered support, feedback and validation and reinforced use of skills. Treatment modalities used include Motivational Interviewing, Cognitive Behavioral Therapy and Dialectical Behavioral Therapy. Interventions include Relationship Skills: Encouraged development and maintenance  of healthy boundaries.    Assessment:    Patient response:   Patient responded to session by accepting feedback, giving feedback and listening    Possible barriers to participation / learning include: and no barriers identified    Health Issues:   None reported       Substance Use Review:   Substance Use: Substance use has decreased    Mental Status/Behavioral Observations  Appearance:   Appropriate   Eye Contact:   Good   Psychomotor Behavior: Normal   Attitude:   Cooperative   Orientation:   All  Speech   Rate / Production: Normal    Volume:  Normal   Mood:    Anxious  Irritable   Affect:    Appropriate   Thought Content:   Clear  Thought Form:  Coherent  Logical     Insight:    Good     Plan:     Safety Plan: No current safety concerns identified.  Recommended that patient call 911 or go to the local ED should there be a change in any of these risk factors.     Barriers to treatment: None identified    Patient Contracts (see media tab):  None    Substance Use: Provided encouragement towards sobriety    Provided support and affirmation for steps taken towards sobriety      Continue or Discharge: Patient will continue in Adult Dual Disorder Program (DDP) as  planned. Patient is likely to benefit from learning and using skills as they work toward the goals identified in their treatment plan.      Liliana Olson, [facility]  May 18, 2022

## 2022-05-18 NOTE — GROUP NOTE
Psychotherapy Group Note    PATIENT'S NAME: Kandy Yañez  MRN:   [id number]  :   1975  [id number]  DATE OF SERVICE: 22  START TIME: 10:00 AM  END TIME: 10:50 AM  FACILITATOR: Liliana Olson LPCC  TOPIC:  EBP Group: Behavioral Activation  [facility] Adult Dual Diagnosis Day Treatment  TRACK: 1    NUMBER OF PARTICIPANTS: 3                                      Service Modality:  Video Visit     Telemedicine Visit: The patient's condition can be safely assessed and treated via synchronous audio and visual telemedicine encounter.      Reason for Telemedicine Visit: Services only offered telehealth    Originating Site (Patient Location): Patient's home    Distant Site (Provider Location): Provider Remote Setting- Home Office    Consent:  The patient/guardian has verbally consented to: the potential risks and benefits of telemedicine (video visit) versus in person care; bill my insurance or make self-payment for services provided; and responsibility for payment of non-covered services.     Patient would like the video invitation sent by:  My Chart    Mode of Communication:  Video Conference via Medical Zoom    As the provider I attest to compliance with applicable laws and regulations related to telemedicine.          Summary of Group / Topics Discussed:  Behavioral Activation: Motivation and Procrastination: Patients explored how they currently spend their time, identifying thoughts and feelings that are motivating and serve to increase desired behaviors.  They also examined behaviors that contribute to procrastination.  Different types of procrastination behaviors were identified, and strategies to reduce individual procrastination and increase motivation were explored and practiced.  Patients identified ways to increase goal-directed activities to enhance mood and reduce symptoms.        Patient Session Goals / Objectives:    Identify current patterns of procrastination behavior  and how they influence thoughts and moods, and inhibit motivation.    Identify behaviors that can be implemented that contribute to improving thoughts and feelings, motivation, and reduce symptoms.    Identify and develop a plan to increase activities that promote a sense of accomplishment and competence.    Practice scheduling positive activities / behaviors into daily routines.      Patient Participation / Response:  Fully participated with the group by sharing personal reflections / insights and openly received / provided feedback with other participants.    Demonstrated understanding of topics discussed through group discussion and participation, Expressed understanding of the relationship between behaviors, thoughts, and feelings and Shared experiences and challenges with making behavioral changes    Treatment Plan:  Patient has a current master individualized treatment plan.  See Epic treatment plan for more information.    Liliana Olson, [facility]

## 2022-05-18 NOTE — GROUP NOTE
Psychoeducation Group Note    PATIENT'S NAME: Kandy Yañez  MRN:   [id number]  :   1975  [id number]  DATE OF SERVICE: 22  START TIME: 11:00 AM  END TIME: 11:50 AM  FACILITATOR: Virginia Lino OTR/L  TOPIC: MH Life Skills Group: Resiliency Development  [facility] Adult Dual Diagnosis Day Treatment  TRACK: ddp1    NUMBER OF PARTICIPANTS: 3                                    Service Modality:  Video Visit     Telemedicine Visit: The patient's condition can be safely assessed and treated via synchronous audio and visual telemedicine encounter.      Reason for Telemedicine Visit: Services only offered telehealth    Originating Site (Patient Location): Patient's home    Distant Site (Provider Location): Provider Remote Setting- Home Office    Consent:  The patient/guardian has verbally consented to: the potential risks and benefits of telemedicine (video visit) versus in person care; bill my insurance or make self-payment for services provided; and responsibility for payment of non-covered services.     Patient would like the video invitation sent by:  My Chart    Mode of Communication:  Video Conference via Medical Zoom    As the provider I attest to compliance with applicable laws and regulations related to telemedicine.     Summary of Group / Topics Discussed:  Resiliency Development:  Coping Skills: Aftercare Coping Cards: Patients were taught how to begin to develop a relapse management kit for both mental and substance use/abuse by creating individualized coping cards. Supports, Triggers, Early Warning Signs, Coping Skills, and Validations were covered. All patients were given resources to review and to personalize their coping cards, as well as continue to work on them throughout the week and program.    Patient Session Goals / Objectives:    Identified individualized coping skills they can use for effectively managing both mental health and substance abuse/abuse symptoms      Improved awareness of different types of coping skills and how to personalize them to their unique situation and circumstances      Established a plan for practice of these skills in their own environments    Practiced and reflected on how to generalize taught skills to their everyday life      Patient Participation / Response:  Fully participated with the group by sharing personal reflections / insights and openly received / provided feedback with other participants.    Patient presentation:   congruent, demonstrated understanding of topic through discussion and participation in activities. Pt shared she uses laughter as coping skill and noted sometimes it is maladaptive. Kandy also shared being able to reach out to a sponsor who shares her spiritual views.     Treatment Plan:  Patient has a current master individualized treatment plan.  See Epic treatment plan for more information.    Virginia Lino MA, OTR/L

## 2022-05-19 ENCOUNTER — HOSPITAL ENCOUNTER (OUTPATIENT)
Dept: BEHAVIORAL HEALTH | Facility: CLINIC | Age: 47
Discharge: HOME | End: 2022-05-19
Attending: PSYCHIATRY & NEUROLOGY
Payer: COMMERCIAL

## 2022-05-19 PROCEDURE — G0177 OPPS/PHP; TRAIN & EDUC SERV: HCPCS | Mod: GT,95

## 2022-05-19 PROCEDURE — 90853 GROUP PSYCHOTHERAPY: CPT | Mod: GT,95 | Performed by: COUNSELOR

## 2022-05-19 NOTE — GROUP NOTE
Psychoeducation Group Note    PATIENT'S NAME: Kandy Yañez  MRN:   [id number]  :   1975  ACCT. NUMBER: [id number]  DATE OF SERVICE: 22  START TIME: 11:00 AM  END TIME: 11:50 AM  FACILITATOR: Norberto Burnham RN  TOPIC:  Wellness Group: [facility]                                    Service Modality:  Video Visit     Telemedicine Visit: The patient's condition can be safely assessed and treated via synchronous audio and visual telemedicine encounter.      Reason for Telemedicine Visit: Covid19    Originating Site (Patient Location): Patient's home    Distant Site (Provider Location): Provider Remote Setting- Home Office    Consent:  The patient/guardian has verbally consented to: the potential risks and benefits of telemedicine (video visit) versus in person care; bill my insurance or make self-payment for services provided; and responsibility for payment of non-covered services.     Patient would like the video invitation sent by:  My Chart    Mode of Communication:  Video Conference via Medical Zoom    As the provider I attest to compliance with applicable laws and regulations related to telemedicine.        [facility] Adult Dual Diagnosis Day Treatment  TRACK: ddp1    NUMBER OF PARTICIPANTS: 5    Summary of Group / Topics Discussed:  Foundations of Health: Nutrition: Design a Meal: Patients were educated on the USDA guidelines for creating meals that meet daily nutritional requirements. With the assistance of the instructor, patients were guided to use these to design well-balanced meals. Barriers and obstacles to meeting daily nutritional needs were discussed in the group and solutions and strategies were explored. Patients were also provided education and resources on healthy snack options, budget saving tips, and safe food handling & preparation.      Patient Session Goals / Objectives:  ? Applied USDA MyPlate guidelines to design a balanced breakfast, lunch, and  dinner  ? Verbalized healthy snack options   ? Identified personal barriers/obstacles to meeting nutritional needs (if present) and listed strategies that could be used to overcome them      Patient Participation / Response:  Fully participated with the group by sharing personal reflections / insights and openly received / provided feedback with other participants.    Demonstrated understanding of topics discussed through group discussion and participation    Treatment Plan:  Patient has a current master individualized treatment plan.  See Epic treatment plan for more information.    Norberto Burnham RN

## 2022-05-19 NOTE — GROUP NOTE
Process Group Note    PATIENT'S NAME: Kandy Yañez  MRN:   [id number]  :   1975  ACCT. NUMBER: [id number]  DATE OF SERVICE: 22  START TIME:  9:00 AM  END TIME:  9:50 AM  FACILITATOR: Liliana Olson [facility]  TOPIC:  Process Group    Diagnoses:  Bipolar I Disorder Current or Most Recent Episode Manic, Mild   300.02 (F41.1) Generalized Anxiety Disorder.  4. Other Diagnoses that is relevant to services:   Substance-Related & Addictive Disorders Stimulant Use Disorder: Specify current severity:  Severe  304.20 (F14.20)Amphetamine type substance, and Severe, Cocaine, Specify if: in remission, Tobacco Use Disorder, Specify current severity:  305.1(F17.200) Moderate, per history PTSD.       [facility] Adult Dual Diagnosis Day Treatment  TRACK: 1    NUMBER OF PARTICIPANTS: 4                                      Service Modality:  Video Visit     Telemedicine Visit: The patient's condition can be safely assessed and treated via synchronous audio and visual telemedicine encounter.      Reason for Telemedicine Visit: Services only offered telehealth    Originating Site (Patient Location): Patient's home    Distant Site (Provider Location): Provider Remote Setting- Home Office    Consent:  The patient/guardian has verbally consented to: the potential risks and benefits of telemedicine (video visit) versus in person care; bill my insurance or make self-payment for services provided; and responsibility for payment of non-covered services.     Patient would like the video invitation sent by:  My Chart    Mode of Communication:  Video Conference via Medical Zoom    As the provider I attest to compliance with applicable laws and regulations related to telemedicine.                Data:    Session content: At the start of this group, patients were invited to check in by identifying themselves, describing their current emotional status, and identifying issues to address in this group.   Area(s) of treatment focus  "addressed in this session included Symptom Management, Personal Safety and Abstinence/Relapse Prevention.    Kandy reported feeling \"more positive\" today.  Her goal for the day is to call and meet her sponsor before her women's group. Client denied additional process time but was receptive to feedback from peers and provided support and encouragement from other group peers.       Therapeutic Interventions/Treatment Strategies:  Psychotherapist offered support, feedback and validation and reinforced use of skills.    Assessment:    Patient response:   Patient responded to session by accepting feedback, giving feedback and listening    Possible barriers to participation / learning include: and no barriers identified    Health Issues:   None reported       Substance Use Review:   Substance Use: No active concerns identified.    Mental Status/Behavioral Observations  Appearance:   Appropriate   Eye Contact:   Good   Psychomotor Behavior: Normal   Attitude:   Cooperative   Orientation:   All  Speech   Rate / Production: Normal    Volume:  Normal   Mood:    Normal  Affect:    Appropriate   Thought Content:   Clear  Thought Form:  Coherent  Logical     Insight:    Good     Plan:     Safety Plan: No current safety concerns identified.  Recommended that patient call 911 or go to the local ED should there be a change in any of these risk factors.     Barriers to treatment: None identified    Patient Contracts (see media tab):  None    Substance Use: Not addressed in session     Continue or Discharge: Patient will continue in Adult Dual Disorder Program (DDP) as planned. Patient is likely to benefit from learning and using skills as they work toward the goals identified in their treatment plan.      Liliana Olson, [facility]  May 19, 2022    "

## 2022-05-19 NOTE — GROUP NOTE
Psychoeducation Group Note    PATIENT'S NAME: Kandy Yañez  MRN:   [id number]  [facility]   1975  [id number]  DATE OF SERVICE: 22  START TIME: 10:00 AM  END TIME: 10:50 AM  FACILITATOR: Virginia Lino OTR/L  TOPIC: MH Life Skills Group: Resiliency Development  [facility] Adult Dual Diagnosis Day Treatment  TRACK: ddp1    NUMBER OF PARTICIPANTS: 5                                      Service Modality:  Video Visit     Telemedicine Visit: The patient's condition can be safely assessed and treated via synchronous audio and visual telemedicine encounter.      Reason for Telemedicine Visit: Services only offered telehealth    Originating Site (Patient Location): Patient's home    Distant Site (Provider Location): Provider Remote Setting- Home Office    Consent:  The patient/guardian has verbally consented to: the potential risks and benefits of telemedicine (video visit) versus in person care; bill my insurance or make self-payment for services provided; and responsibility for payment of non-covered services.     Patient would like the video invitation sent by:  My Chart    Mode of Communication:  Video Conference via Medical Zoom    As the provider I attest to compliance with applicable laws and regulations related to telemedicine.        Summary of Group / Topics Discussed:  Resiliency Development:  Coping Skills: Aftercare Coping Cards part 2: Patients were taught how to begin to develop a relapse management kit for both mental and substance use/abuse by creating individualized coping cards. Patients reviewed accomplishments, leisure, movement and structure identifying the role of each in recovery.    Patient Session Goals / Objectives:    Identified individualized coping skills they can use for effectively managing both mental health and substance abuse/abuse symptoms     Improved awareness of different types of coping skills and how to personalize them to their unique situation and  circumstances      Established a plan for practice of these skills in their own environments    Practiced and reflected on how to generalize taught skills to their everyday life      Patient Participation / Response:  Fully participated with the group by sharing personal reflections / insights and openly received / provided feedback with other participants.    Patient presentation:   congruent, demonstrated understanding of topic through discussion and participation in activities. Pt shared an educational accomplishment. She noted she enjoys solitary walks.     Treatment Plan:  Patient has a current master individualized treatment plan.  See Epic treatment plan for more information.    Virginia Lino MA, OTR/L

## 2022-05-23 ENCOUNTER — TELEPHONE (OUTPATIENT)
Dept: BEHAVIORAL HEALTH | Facility: CLINIC | Age: 47
End: 2022-05-23
Payer: COMMERCIAL

## 2022-05-24 ENCOUNTER — HOSPITAL ENCOUNTER (OUTPATIENT)
Dept: BEHAVIORAL HEALTH | Facility: CLINIC | Age: 47
Discharge: HOME | End: 2022-05-24
Attending: PSYCHIATRY & NEUROLOGY
Payer: COMMERCIAL

## 2022-05-24 ENCOUNTER — ANCILLARY PROCEDURE (OUTPATIENT)
Dept: MAMMOGRAPHY | Facility: CLINIC | Age: 47
End: 2022-05-24
Payer: COMMERCIAL

## 2022-05-24 DIAGNOSIS — F15.90 STIMULANT USE DISORDER: ICD-10-CM

## 2022-05-24 DIAGNOSIS — F31.32 MODERATE DEPRESSED BIPOLAR I DISORDER (H): Primary | ICD-10-CM

## 2022-05-24 DIAGNOSIS — Z00.00 ROUTINE GENERAL MEDICAL EXAMINATION AT A HEALTH CARE FACILITY: ICD-10-CM

## 2022-05-24 DIAGNOSIS — F41.1 GENERALIZED ANXIETY DISORDER: ICD-10-CM

## 2022-05-24 DIAGNOSIS — Z12.31 VISIT FOR SCREENING MAMMOGRAM: ICD-10-CM

## 2022-05-24 DIAGNOSIS — F31.9 BIPOLAR I DISORDER (H): ICD-10-CM

## 2022-05-24 PROCEDURE — G0177 OPPS/PHP; TRAIN & EDUC SERV: HCPCS | Mod: GT,95

## 2022-05-24 PROCEDURE — 90853 GROUP PSYCHOTHERAPY: CPT | Mod: GT,95 | Performed by: COUNSELOR

## 2022-05-24 PROCEDURE — 77067 SCR MAMMO BI INCL CAD: CPT | Mod: TC | Performed by: RADIOLOGY

## 2022-05-24 PROCEDURE — 99214 OFFICE O/P EST MOD 30 MIN: CPT | Mod: 95 | Performed by: PSYCHIATRY & NEUROLOGY

## 2022-05-24 RX ORDER — DIVALPROEX SODIUM 500 MG/1
1000 TABLET, DELAYED RELEASE ORAL AT BEDTIME
Qty: 60 TABLET | Refills: 0 | Status: SHIPPED | OUTPATIENT
Start: 2022-05-24 | End: 2022-06-09 | Stop reason: DRUGHIGH

## 2022-05-24 NOTE — GROUP NOTE
Psychoeducation Group Note    PATIENT'S NAME: Kandy Yañez  MRN:   [id number]  [facility]   1975  [id number]  DATE OF SERVICE: 22  START TIME: 11:00 AM  END TIME: 11:50 AM  FACILITATOR: Norberto Burnham RN  TOPIC: MH Wellness Group: Health Maintenance                                    Service Modality:  Video Visit     Telemedicine Visit: The patient's condition can be safely assessed and treated via synchronous audio and visual telemedicine encounter.      Reason for Telemedicine Visit: Covid19    Originating Site (Patient Location): Patient's home    Distant Site (Provider Location): Provider Remote Setting- Home Office    Consent:  The patient/guardian has verbally consented to: the potential risks and benefits of telemedicine (video visit) versus in person care; bill my insurance or make self-payment for services provided; and responsibility for payment of non-covered services.     Patient would like the video invitation sent by:  My Chart    Mode of Communication:  Video Conference via Medical Zoom    As the provider I attest to compliance with applicable laws and regulations related to telemedicine.        [facility] Adult Dual Diagnosis Day Treatment  TRACK: DDP1    NUMBER OF PARTICIPANTS: 5    Summary of Group / Topics Discussed:  Health Maintenance: Wellness Check-in: Patients met with group facilitator to individually review a holistic wellness check-in to assess patient medication adherence/concerns, appointments, physical and mental health, exercise, nutrition, sleep, socialization, substance use, and need for service/resource referrals.       Patient Session Goals / Objectives:    Discussed various aspects of health management and self-care related to physical and mental health    Demonstrated increased self-awareness of current wellness needs    Developed health literacy skills in navigating the healthcare system and self-advocacy/communicating needs with health care team         Patient Participation / Response:  Fully participated with the group by sharing personal reflections / insights and openly received / provided feedback with other participants.    Demonstrated understanding of topics discussed through group discussion and participation    Treatment Plan:  Patient has a current master individualized treatment plan.  See Epic treatment plan for more information.    Norberto Burnahm RN

## 2022-05-24 NOTE — GROUP NOTE
Psychotherapy Group Note    PATIENT'S NAME: Kandy Yañez  MRN:   [id number]  :   1975  ACCT. NUMBER: [id number]  DATE OF SERVICE: 22  START TIME: 10:00 AM  END TIME: 10:50 AM  FACILITATOR: Liliana Olson [facility]  TOPIC: MH EBP Group: Cognitive Restructuring  [facility] Adult Dual Diagnosis Day Treatment  TRACK: 1    NUMBER OF PARTICIPANTS: 5                                      Service Modality:  Video Visit     Telemedicine Visit: The patient's condition can be safely assessed and treated via synchronous audio and visual telemedicine encounter.      Reason for Telemedicine Visit: Services only offered telehealth    Originating Site (Patient Location): Patient's home    Distant Site (Provider Location): Provider Remote Setting- Home Office    Consent:  The patient/guardian has verbally consented to: the potential risks and benefits of telemedicine (video visit) versus in person care; bill my insurance or make self-payment for services provided; and responsibility for payment of non-covered services.     Patient would like the video invitation sent by:  My Chart    Mode of Communication:  Video Conference via Medical Zoom    As the provider I attest to compliance with applicable laws and regulations related to telemedicine.          Summary of Group / Topics Discussed:  Cognitive Restructuring: Distortions: Patients received an overview of how to identify common cognitive distortions. Patients will explore alternatives to cognitive distortions and practice challenging their negative thought patterns. The goal is to help patients target modify ineffective thought patterns.     Patient Session Goals / Objectives:    Familiarized self with ineffective / unhealthy thoughts and how they develop.      Explored impact of ineffective thoughts / distortions on mood and activity    Formulated new neutral/positive alternatives to challenge less helpful / ineffective thoughts.    Practiced and plan to apply  in daily life               Patient Participation / Response:  Fully participated with the group by sharing personal reflections / insights and openly received / provided feedback with other participants.    Demonstrated understanding of topics discussed through group discussion and participation, Expressed understanding of the relationship between behaviors, thoughts, and feelings and Demonstrated knowledge of personal thought patterns and how they impact their mood and behavior.    Treatment Plan:  Patient has a current master individualized treatment plan.  See Epic treatment plan for more information.    Liliana Olson, [facility]

## 2022-05-24 NOTE — GROUP NOTE
Process Group Note    PATIENT'S NAME: Kandy Yañez  MRN:   [id number]  :   1975  ACCT. NUMBER: [id number]  DATE OF SERVICE: 22  START TIME:  9:00 AM  END TIME:  9:50 AM  FACILITATOR: Liliana Olson [facility]  TOPIC:  Process Group    Diagnoses:  Bipolar I Disorder Current or Most Recent Episode Manic, Mild   300.02 (F41.1) Generalized Anxiety Disorder.  4. Other Diagnoses that is relevant to services:   Substance-Related & Addictive Disorders Stimulant Use Disorder: Specify current severity:  Severe  304.20 (F14.20)Amphetamine type substance, and Severe, Cocaine, Specify if: in remission, Tobacco Use Disorder, Specify current severity:  305.1(F17.200) Moderate, per history PTSD.       [facility] Adult Dual Diagnosis Day Treatment  TRACK: 1    NUMBER OF PARTICIPANTS: 5                                      Service Modality:  Video Visit     Telemedicine Visit: The patient's condition can be safely assessed and treated via synchronous audio and visual telemedicine encounter.      Reason for Telemedicine Visit: Services only offered telehealth    Originating Site (Patient Location): Patient's home    Distant Site (Provider Location): Provider Remote Setting- Home Office    Consent:  The patient/guardian has verbally consented to: the potential risks and benefits of telemedicine (video visit) versus in person care; bill my insurance or make self-payment for services provided; and responsibility for payment of non-covered services.     Patient would like the video invitation sent by:  My Chart    Mode of Communication:  Video Conference via Medical Zoom    As the provider I attest to compliance with applicable laws and regulations related to telemedicine.                Data:    Session content: At the start of this group, patients were invited to check in by identifying themselves, describing their current emotional status, and identifying issues to address in this group.   Area(s) of treatment focus  "addressed in this session included Symptom Management, Personal Safety and Abstinence/Relapse Prevention.    Kandy reported feeling \"all over the place\" today.  Her goals are to stay out of bed and get outside.  She reported that she has felt very depressed the past several days and has had trouble getting out of bed.  She lost her job and has been butting heads with her sponsor.  Client and group peers validated her experiences and writer normalized her mood fluctuations due to bioplar disorder and discussed strategies to manage her depression.     Therapeutic Interventions/Treatment Strategies:  Psychotherapist offered support, feedback and validation and reinforced use of skills. Treatment modalities used include Motivational Interviewing, Cognitive Behavioral Therapy and Dialectical Behavioral Therapy. Interventions include Symptoms Management: Promoted understanding of their diagnoses and how it impacts their functioning.    Assessment:    Patient response:   Patient responded to session by accepting feedback, giving feedback and listening    Possible barriers to participation / learning include: and no barriers identified    Health Issues:   None reported       Substance Use Review:   Substance Use: No active concerns identified.    Mental Status/Behavioral Observations  Appearance:   Appropriate   Eye Contact:   Good   Psychomotor Behavior: Normal   Attitude:   Cooperative   Orientation:   All  Speech   Rate / Production: Normal    Volume:  Normal   Mood:    Depressed   Affect:    Appropriate   Thought Content:   Clear  Thought Form:  Coherent  Logical     Insight:    Good     Plan:     Safety Plan: No current safety concerns identified.  Recommended that patient call 911 or go to the local ED should there be a change in any of these risk factors.     Barriers to treatment: None identified    Patient Contracts (see media tab):  None    Substance Use: Provided encouragement towards sobriety    Provided support " and affirmation for steps taken towards sobriety      Continue or Discharge: Patient will continue in Adult Dual Disorder Program (DDP) as planned. Patient is likely to benefit from learning and using skills as they work toward the goals identified in their treatment plan.      Liliana Olson, [facility]  May 24, 2022

## 2022-05-24 NOTE — PROGRESS NOTES
"[facility] Mental Health Outpatient Programs  Provider Interval History Note    Program: Dual Disorder Program    Track: 1    PATIENT'S NAME: Kandy Yañez  MRN:   [id number]  :   1975  ACCT. NUMBER: [id number]  DATE OF SERVICE: 22  CALL/VIDEO START TIME: 957  CALL/VIDEO END TIME: 1022      Interval History:  \"I got the labs done and saw that the Depakote level was low.\" Kandy presents today for follow-up and ongoing program supervision.   Endorses:    Recently started new job  o [place]verne overwhelmed    \"really emotional and I go through this extreme dark depression and rage...\"   o Fatigue, hunger    Symptoms:    Decrease in motivation, drive    Increase in depression    Attention and focus decreased    Reviewed history of destiny  o \"Very sexual\" during episodes  - Cheating during relationships  o Impulsive  o     Substance use:    Sober     Reactions/thoughts about program:    \"It's good      Risk Assessment:    Suicidal ideation: denies current or recent suicidal ideation or behavior    Thoughts of non-suicidal self-injury: denied    Recent self-injurious behavior: denied    Homicidal ideation: denied    Other safety concerns: denied      Medications:  Current Outpatient Medications   Medication Sig Dispense Refill     divalproex sodium delayed-release (DEPAKOTE) 500 MG DR tablet Take 2 tablets (1,000 mg) by mouth At Bedtime Take 1 tab in the morning and 2 tabs at bedtime - Oral 60 tablet 0     hydrOXYzine (VISTARIL) 50 MG capsule Take 1-2 capsules ( mg) by mouth nightly as needed for other (insomnia) 60 capsule 1     melatonin 5 MG CAPS        multivitamin w/minerals (THERA-VIT-M) tablet Take 1 tablet by mouth daily       nicotine (NICODERM CQ) 21 MG/24HR 24 hr patch Place 1 patch onto the skin every 24 hours 30 patch 0     venlafaxine (EFFEXOR) 37.5 MG tablet Take 1 tablet (37.5 mg) by mouth every morning 30 tablet 0     metroNIDAZOLE (METROGEL) 0.75 % " "vaginal gel Place 1 applicator (5 g) vaginally daily 45 g 0         The above list was reviewed and updated in [facility] with patient today.     Patient is taking medications as prescribed and denies adverse effects.      Laboratory Results:  Most recent labs reviewed. Pertinent updates/findings: None.     Metrics:  PHQ-9 scores:   PHQ-9 SCORE 2/28/2022 3/21/2022 4/7/2022   PHQ-9 Total Score MyChart - - 20 (Severe depression)   PHQ-9 Total Score 15 11 20       JAYLIN-7 scores:   JAYLIN-7 SCORE 2/28/2022 3/21/2022 4/7/2022   Total Score - - 14 (moderate anxiety)   Total Score 12 10 14       CSSR-S: No flowsheet data found.      Mental Status Examination (limited due to video virtual visit format):  Vital Signs: There were no vitals taken for this virtual visit.  Appearance: appropriately groomed, appears stated age, and in mild distress.  Attitude: cooperative   Eye Contact: good to the extent that can be determined in a video visit  Muscle Strength and Tone: no gross abnormalities based on remote observation  Psychomotor Behavior: Appropriate and Calm; no evidence of tardive dyskinesia, dystonia, or tics based on remote observation  Gait and Station: normal, no gross abnormalities based on remote observation  Speech: clear, coherent, normal prosody, regular rate, regular rhythm and fluent  Associations: No loosening of associations  Thought Process: coherent and goal directed  Thought Content: no evidence of suicidal ideation or homicidal ideation, no evidence of psychotic thought, no auditory hallucinations present and no visual hallucinations present  Mood: \"anxious\"  Affect: mood congruent, intensity is normal, constricted mobility, restricted range and reactive  Insight: good  Judgment: intact, adequate for safety  Impulse Control: intact  Oriented to: time, place, person and situation  Attention Span and Concentration: normal  Language: Intact  Recent and Remote Memory: intact to interview. Not formally assessed. No " amnesia.  Fund of Knowledge/Assessment of Intelligence: Average  Capacity of Activities of Daily Living: Independent, able to participate in programmatic care services.        Diagnosis/es:  1. 296.41 Bipolar I Disorder Current or Most Recent Episode Manic, Mild   2. 300.02 (F41.1) Generalized Anxiety Disorder.  3. Substance-Related & Addictive Disorders Stimulant Use Disorder, 304.20 (F14.20) Severe, Cocaine   4. Substance-Related & Addictive Disorders Stimulant Use Disorder:  304.40 (F15.20) Severe, Amphetamine type substance  5. Tobacco Use Disorder, Specify current severity:  305.1(F17.200) Moderate  6. PTSD by history        Assessment/Plan:  Kandy presents today for follow up. Endorses continuing depressive symptoms, slightly exacerbated recently by work-related stressors, the patient has stopped working so that should be less of a factor in the near future.    Also still noticing a good deal of mood variability around the time of menses.  Given history of destiny, this is more likely a manifestation of bipolar disorder rather than premenstrual dysphoric disorder per se.  In PMDD, SSRIs or SNRIs are treatment of choice.  These are more problematic in bipolar disorder, of course, as they can provoke destiny.      Discussed that valproate level is low, which is not problematic in and of itself but does suggest that there is room for increase without necessarily courting toxicity.  Will increase divalproex today to 1000 mg daily. Prescription written for 1000 mg at bedtime but can split twice daily if preferred/if noting decline in mood in the afternoon/evening. Repeat valproate level after 1 week at new dose (5/31 or later).  Can consider increase in either divalproex or venlafaxine depending on our observations over the coming month.  Patient's next menstrual cycle will start in about 2 weeks.    Patient has been sober for 2 months now and has quit using tobacco.  Continues to benefit from treatment at this level of  care and in this treatment milieu.  Continue dual disorder program.  Will follow up in a month.        Bipolar disorder  o Overall slightly worsened   o Depressive symptoms more prominent recently  o Treatment considerations, discussion as noted above  o Continue DDP      Anxiety  o Overall stable   o Treatment as noted above      Substance use disorders (stimulants, tobacco)  o Overall stable   o Remains abstinent  o Treatment as noted above      PTSD  o Overall stable   o Treatment as noted above    Continue all other medications as reviewed per electronic medical record today    Continue therapy as planned:    Enrolled in Dual Disorder Program    Continues to benefit from services    Continues to meet criteria for this level of care    Patient would be at reasonable risk of requiring a higher level of care in the absence of current services.    I feel this patient does not meet criteria for an involuntary hold and is appropriate for treatment at an outpatient level of care.    Continue with individual therapist as appropriate    Safety plan reviewed:    To the Emergency Department as needed or call after hours crisis line at [phone] or [phone]. [place] Crisis Text Line: Text [place] to 030548 or Suicide LifeLine Chat: suicidepreventionlifeline.org/chat    Follow-up:     schedule an appointment with me or another program provider in approximately 4 weeks or sooner if needed.  Can speak with a staff member or call the appropriate program number (see below) to schedule    Follow up with outpatient provider(s) as planned or sooner if needed for acute medical concerns.    Questions or concerns:    Call program line with questions or concerns (see below)    Minteoshart may be used to communicate with your provider, but this is not intended to be used for emergencies.      [facility] Adult Mental Health Program lines:  [facility]: [phone]  Dual Disorder: [phone]  Adult Day Treatment:   [phone]  55+/Intensive Outpatient: [phone]    Community Resources:    National Suicide Prevention Lifeline: 957.426.8941 (TTY: 834.259.1626). Call anytime for help.  (www.suicidepreventionlifeline.org)  [place]ness (www.norris.org): 248.450.7289 or 458-432-6114.   Mental Health Association (www.mentalhealth.org): 995.390.8018 or 378-602-6597.  [place] Crisis Text Line: Text [place] to 690760  Suicide LifeLine Chat: suicideSleepy's.org/chat    Treatment Objective(s) Addressed in This Session:  The purpose of today's virtual visit is for this writer to provide oversight of patient's care while receiving program services. Specific treatment goals addressed included personal safety, symptoms stabilization and management, wellness and mental health, and community resources/discharge planning.     This author or another program provider will follow up with the patient as noted above.     Patient agrees with the current plan of care.    Sriram Hines MD  5/24/22      Visit Details:  Type of service:  Video Visit    Start/End Time: see above    Originating Location (pt. Location): Home in [place]    Distant Location (provider location): Provider Remote Setting- Home Office    Platform used for Video Visit: Zoom    Physician has received verbal consent for a Video Visit from the patient? Yes    30 minutes spent on the date of the encounter doing chart review, patient visit and documentation     This document completed in part using Dragon Medical One dictation software.  Please excuse any inadvertent word or phrase substitutions.

## 2022-05-25 ENCOUNTER — HOSPITAL ENCOUNTER (OUTPATIENT)
Dept: BEHAVIORAL HEALTH | Facility: CLINIC | Age: 47
Discharge: HOME | End: 2022-05-25
Attending: PSYCHIATRY & NEUROLOGY
Payer: COMMERCIAL

## 2022-05-25 PROCEDURE — G0177 OPPS/PHP; TRAIN & EDUC SERV: HCPCS | Mod: GT,95

## 2022-05-25 PROCEDURE — 90853 GROUP PSYCHOTHERAPY: CPT | Mod: GT,95 | Performed by: SOCIAL WORKER

## 2022-05-25 PROCEDURE — 90853 GROUP PSYCHOTHERAPY: CPT | Mod: GT,95 | Performed by: COUNSELOR

## 2022-05-25 NOTE — GROUP NOTE
Psychotherapy Group Note    PATIENT'S NAME: Kandy Yañez  MRN:   [id number]  :   1975  ACCT. NUMBER: [id number]  DATE OF SERVICE: 22  START TIME: 10:00 AM  END TIME: 10:50 AM  FACILITATOR: Christel Cisneros LICSW  TOPIC: MH EBP Group: Cognitive Restructuring  [facility] [place] Adult Dual Diagnosis Day Treatment  TRACK: DDP 1    NUMBER OF PARTICIPANTS: 7    Summary of Group / Topics Discussed:  Cognitive Restructuring: Distortions: Patients received an overview of how to identify common cognitive distortions. Patients will explore alternatives to cognitive distortions and practice challenging their negative thought patterns. The goal is to help patients target modify ineffective thought patterns.     Patient Session Goals / Objectives:    Familiarized self with ineffective / unhealthy thoughts and how they develop.      Explored impact of ineffective thoughts / distortions on mood and activity    Formulated new neutral/positive alternatives to challenge less helpful / ineffective thoughts.    Practiced and plan to apply in daily life                                        Service Modality:  Video Visit     Telemedicine Visit: The patient's condition can be safely assessed and treated via synchronous audio and visual telemedicine encounter.      Reason for Telemedicine Visit: Services only offered telehealth    Originating Site (Patient Location): Patient's home    Distant Site (Provider Location): Provider Remote Setting- Home Office    Consent:  The patient/guardian has verbally consented to: the potential risks and benefits of telemedicine (video visit) versus in person care; bill my insurance or make self-payment for services provided; and responsibility for payment of non-covered services.     Patient would like the video invitation sent by:  My Chart    Mode of Communication:  Video Conference via Medical Zoom    As the provider I attest to compliance with applicable laws and regulations  related to telemedicine.            Patient Participation / Response:  Moderately participated, sharing some personal reflections / insights and adequately adequately received / provided feedback with other participants.    Demonstrated understanding of topics discussed through group discussion and participation    Treatment Plan:  Patient has a current master individualized treatment plan.  See Epic treatment plan for more information.    Christel Cisneros, KINGSLEYSW

## 2022-05-25 NOTE — GROUP NOTE
Process Group Note    PATIENT'S NAME: Kandy Yañez  MRN:   [id number]  :   1975  ACCT. NUMBER: [id number]  DATE OF SERVICE: 22  START TIME:  9:00 AM  END TIME:  9:50 AM  FACILITATOR: Liliana Olson [facility]  TOPIC:  Process Group    Diagnoses:  Bipolar I Disorder Current or Most Recent Episode Manic, Mild   300.02 (F41.1) Generalized Anxiety Disorder.  4. Other Diagnoses that is relevant to services:   Substance-Related & Addictive Disorders Stimulant Use Disorder: Specify current severity:  Severe  304.20 (F14.20)Amphetamine type substance, and Severe, Cocaine, Specify if: in remission, Tobacco Use Disorder, Specify current severity:  305.1(F17.200) Moderate, per history PTSD.       [facility] Adult Dual Diagnosis Day Treatment  TRACK: 1    NUMBER OF PARTICIPANTS: 6                                      Service Modality:  Video Visit     Telemedicine Visit: The patient's condition can be safely assessed and treated via synchronous audio and visual telemedicine encounter.      Reason for Telemedicine Visit: Services only offered telehealth    Originating Site (Patient Location): Patient's home    Distant Site (Provider Location): Provider Remote Setting- Home Office    Consent:  The patient/guardian has verbally consented to: the potential risks and benefits of telemedicine (video visit) versus in person care; bill my insurance or make self-payment for services provided; and responsibility for payment of non-covered services.     Patient would like the video invitation sent by:  My Chart    Mode of Communication:  Video Conference via Medical Zoom    As the provider I attest to compliance with applicable laws and regulations related to telemedicine.                Data:    Session content: At the start of this group, patients were invited to check in by identifying themselves, describing their current emotional status, and identifying issues to address in this group.   Area(s) of treatment focus  addressed in this session included Symptom Management, Personal Safety and Abstinence/Relapse Prevention.    Kandy reported feeling tired today.  Her goal is to do some journaling. Client denied additional process time but was receptive to feedback from peers and provided support and encouragement from other group peers.       Therapeutic Interventions/Treatment Strategies:  Psychotherapist offered support, feedback and validation and reinforced use of skills.    Assessment:    Patient response:   Patient responded to session by accepting feedback, giving feedback and listening    Possible barriers to participation / learning include: and no barriers identified    Health Issues:   None reported       Substance Use Review:   Substance Use: Substance use has decreased    Mental Status/Behavioral Observations  Appearance:   Appropriate   Eye Contact:   Good   Psychomotor Behavior: Normal   Attitude:   Cooperative   Orientation:   All  Speech   Rate / Production: Normal    Volume:  Normal   Mood:    Depressed   Affect:    Appropriate   Thought Content:   Clear  Thought Form:  Coherent  Logical     Insight:    Good     Plan:     Safety Plan: No current safety concerns identified.  Recommended that patient call 911 or go to the local ED should there be a change in any of these risk factors.     Barriers to treatment: None identified    Patient Contracts (see media tab):  None    Substance Use: Provided encouragement towards sobriety    Provided support and affirmation for steps taken towards sobriety      Continue or Discharge: Patient will continue in Adult Dual Disorder Program (DDP) as planned. Patient is likely to benefit from learning and using skills as they work toward the goals identified in their treatment plan.      Liliana Olson, [facility]  May 25, 2022

## 2022-05-25 NOTE — GROUP NOTE
Psychoeducation Group Note    PATIENT'S NAME: Kandy Yañez  MRN:   [id number]  :   1975  [id number]  DATE OF SERVICE: 22  START TIME: 11:00 AM  END TIME: 11:50 AM  FACILITATOR: Virginia Lino OTR/L  TOPIC: MH Life Skills Group: Sensory Approaches in Mental Health  [facility] Adult Dual Diagnosis Day Treatment  TRACK: ddp1    NUMBER OF PARTICIPANTS: 7                                      Service Modality:  Video Visit     Telemedicine Visit: The patient's condition can be safely assessed and treated via synchronous audio and visual telemedicine encounter.      Reason for Telemedicine Visit: Services only offered telehealth    Originating Site (Patient Location): Patient's home    Distant Site (Provider Location): Provider Remote Setting- Home Office    Consent:  The patient/guardian has verbally consented to: the potential risks and benefits of telemedicine (video visit) versus in person care; bill my insurance or make self-payment for services provided; and responsibility for payment of non-covered services.     Patient would like the video invitation sent by:  My Chart    Mode of Communication:  Video Conference via Medical Zoom    As the provider I attest to compliance with applicable laws and regulations related to telemedicine.          Summary of Group / Topics Discussed:  Sensory Approaches in Mental Health:  Self Regulation Through Sensory Modulation:  Patients were provided with education on Autonomic Nervous System activation and taught skills that can be used immediately to help them calm down and regain self control.  Patients were taught how to recognize specific signs and symptoms of their individualized state of arousal and how to make changes when needed.  Patients explored body based skills (bottom up processing skills) and techniques to help manage symptoms and change level of arousal when needed to be in control and comfortable so they are able to function in  different environments.       Patient Session Goals / Objectives:    Identified specific and individualized neurosensory skills to help when distressed and for crisis management    Identified signs and symptoms of current level of arousal and ways to make changes in level of arousal when needed    Established a plan for practice of these skills in their own environments      Patient Participation / Response:  Fully participated with the group by sharing personal reflections / insights and openly received / provided feedback with other participants.    Patient presentation:   congruent, demonstrated understanding of topic through discussion and participation in activities. Kandy shared using an jeaneth (mindfulness readings, breathing meditation, with biofeedback).    Treatment Plan:  Patient has a current master individualized treatment plan.  See Epic treatment plan for more information.    Virginia Lino MA, OTR/L

## 2022-05-27 ENCOUNTER — HOSPITAL ENCOUNTER (OUTPATIENT)
Dept: BEHAVIORAL HEALTH | Facility: CLINIC | Age: 47
Discharge: HOME | End: 2022-05-27
Attending: PSYCHIATRY & NEUROLOGY
Payer: COMMERCIAL

## 2022-05-27 PROCEDURE — G0177 OPPS/PHP; TRAIN & EDUC SERV: HCPCS | Mod: GT,95

## 2022-05-27 PROCEDURE — 90853 GROUP PSYCHOTHERAPY: CPT | Mod: GT,95 | Performed by: COUNSELOR

## 2022-05-27 NOTE — GROUP NOTE
Psychoeducation Group Note    PATIENT'S NAME: Kandy Yañez  MRN:   [id number]  :   1975  [id number]  DATE OF SERVICE: 22  START TIME: 11:00 AM  END TIME: 11:50 AM  FACILITATOR: Virginia Lino OTR/L  TOPIC: MH Life Skills Group: Lifestyle Balance and Structure  [facility] Adult Dual Diagnosis Day Treatment  TRACK: ddp1    NUMBER OF PARTICIPANTS: 7    Service Modality:  Video Visit     Telemedicine Visit: The patient's condition can be safely assessed and treated via synchronous audio and visual telemedicine encounter.      Reason for Telemedicine Visit: Services only offered telehealth    Originating Site (Patient Location): Patient's home    Distant Site (Provider Location): Provider Remote Setting- Home Office    Consent:  The patient/guardian has verbally consented to: the potential risks and benefits of telemedicine (video visit) versus in person care; bill my insurance or make self-payment for services provided; and responsibility for payment of non-covered services.     Patient would like the video invitation sent by:  My Chart    Mode of Communication:  Video Conference via Medical Zoom    As the provider I attest to compliance with applicable laws and regulations related to telemedicine.     Summary of Group / Topics Discussed:  Lifestyle Balance and Strucure:  Benefits of Structure on Mental Health: Patients explored and learned about the benefits and possibilities of structure to create lifestyle balance that supports their mental and physical wellbeing. Patients were assisted to identify individualized weekend plans - including self-care, home management, socializing, leisure and reflective activities. Pt's recognized the benefits of activities on mental health and problem solved barriers to engagement and strategies to overcome them.  Patients engaged in an experiential leisure activity to gain self-awareness and build milieu social aspects and reflected on the impact the  "experiential activity had on their mood.       Patient Session Goals / Objectives:    Increased awareness of the importance of engagement in activities to support lifestyle balance and perceived quality of life    Identified strategies to recognize and challenge barriers to participation     Facilitated exploration of leisure    Practiced and reflected on how to generalize taught skills to their everyday life      Patient Participation / Response:  Moderately participated, sharing some personal reflections / insights and adequately adequately received / provided feedback with other participants.    Patient presentation: apperaed high energy and described herself as \"chaotic,\" and noted a struggle with feeling things has become too rigid/structured. She also noted the difficulty of black and white thinking - and expressed some doubt of swinging too far in the opposite direction. Kandy committed to checking in with herself and prioritizing meeting self-care needs, e.g. sleep, nutrition, etc.    Treatment Plan:  Patient has a current master individualized treatment plan.  See Epic treatment plan for more information.    Virginia Lino, OTR/L      "

## 2022-05-27 NOTE — GROUP NOTE
Psychotherapy Group Note    PATIENT'S NAME: Kandy Yañez  MRN:   [id number]  :   1975  [id number]  DATE OF SERVICE: 22  START TIME: 10:00 AM  END TIME: 10:50 AM  FACILITATOR: Liliana Olson LPCC  TOPIC:  EBP Group: DDP Relapse Prevention  [facility] Adult Dual Diagnosis Day Treatment  TRACK: 1    NUMBER OF PARTICIPANTS: 7                                      Service Modality:  Video Visit     Telemedicine Visit: The patient's condition can be safely assessed and treated via synchronous audio and visual telemedicine encounter.      Reason for Telemedicine Visit: Services only offered telehealth    Originating Site (Patient Location): Patient's home    Distant Site (Provider Location): Provider Remote Setting- Home Office    Consent:  The patient/guardian has verbally consented to: the potential risks and benefits of telemedicine (video visit) versus in person care; bill my insurance or make self-payment for services provided; and responsibility for payment of non-covered services.     Patient would like the video invitation sent by:  My Chart    Mode of Communication:  Video Conference via Medical Zoom    As the provider I attest to compliance with applicable laws and regulations related to telemedicine.          Summary of Group / Topics Discussed:  DDP Relapse Prevention: Relationship Mapping: Patients identified different types of relationships they have in their life and examined if there is conflict or closeness, the degree of conflict or closeness, and the reason for conflict. The goal of this topic is to help patients gain awareness of the relationships they have with others, identify types of conflict in patients  lives and how they impact symptoms/functioning, and identify how they can improve relationships with relationship and interpersonal skills they have learned.     Patient Session Goals / Objectives:    Familiarized patients with awareness to the different types  of relationships they may have with different people and substances in their lives    Discussed and practiced strategies to promote healthier understanding of interpersonal relationships with a focus on awareness of conflict, the causes of conflict, and the ways to transform conflict    Discussed the use of substances and its impact on their relationships      Patient Participation / Response:  Fully participated with the group by sharing personal reflections / insights and openly received / provided feedback with other participants.    Demonstrated understanding of topics discussed through group discussion and participation, Demonstrated understanding of utilizing relapse prevention skills to manage urges and maintain sobriety and Identified / Expressed personal readiness to utilize relapse prevention skills    Treatment Plan:  Patient has a current master individualized treatment plan.  See Epic treatment plan for more information.    Liliana Olson, [facility]

## 2022-05-27 NOTE — GROUP NOTE
Process Group Note    PATIENT'S NAME: Kandy Yañez  MRN:   [id number]  :   1975  ACCT. NUMBER: [id number]  DATE OF SERVICE: 22  START TIME:  9:00 AM  END TIME:  9:50 AM  FACILITATOR: Liliana Olson [facility]  TOPIC:  Process Group    Diagnoses:  Bipolar I Disorder Current or Most Recent Episode Manic, Mild   300.02 (F41.1) Generalized Anxiety Disorder.  4. Other Diagnoses that is relevant to services:   Substance-Related & Addictive Disorders Stimulant Use Disorder: Specify current severity:  Severe  304.20 (F14.20)Amphetamine type substance, and Severe, Cocaine, Specify if: in remission, Tobacco Use Disorder, Specify current severity:  305.1(F17.200) Moderate, per history PTSD.       [facility] Adult Dual Diagnosis Day Treatment  TRACK: 1    NUMBER OF PARTICIPANTS: 4                                      Service Modality:  Video Visit     Telemedicine Visit: The patient's condition can be safely assessed and treated via synchronous audio and visual telemedicine encounter.      Reason for Telemedicine Visit: Services only offered telehealth    Originating Site (Patient Location): Patient's home    Distant Site (Provider Location): Provider Remote Setting- Home Office    Consent:  The patient/guardian has verbally consented to: the potential risks and benefits of telemedicine (video visit) versus in person care; bill my insurance or make self-payment for services provided; and responsibility for payment of non-covered services.     Patient would like the video invitation sent by:  My Chart    Mode of Communication:  Video Conference via Medical Zoom    As the provider I attest to compliance with applicable laws and regulations related to telemedicine.                Data:    Session content: At the start of this group, patients were invited to check in by identifying themselves, describing their current emotional status, and identifying issues to address in this group.   Area(s) of treatment focus  "addressed in this session included Symptom Management, Personal Safety and Abstinence/Relapse Prevention.    Kandy reported feeling \"discombobulated\" today.  Her goal for the day is to go for a walk outside. She reported spending the night with a friend because she was feeling she needed a change of pace.  She is looking forward to [facility] this weekend. Client denied additional process time but was receptive to feedback from peers and provided support and encouragement from other group peers.       Therapeutic Interventions/Treatment Strategies:  Psychotherapist offered support, feedback and validation and reinforced use of skills.    Assessment:    Patient response:   Patient responded to session by accepting feedback, giving feedback and listening    Possible barriers to participation / learning include: and no barriers identified    Health Issues:   None reported       Substance Use Review:   Substance Use: Substance use has decreased    Mental Status/Behavioral Observations  Appearance:   Appropriate   Eye Contact:   Good   Psychomotor Behavior: Normal   Attitude:   Cooperative   Orientation:   All  Speech   Rate / Production: Normal    Volume:  Normal   Mood:    Hypomanic   Affect:    Appropriate   Thought Content:   Clear  Thought Form:  Coherent  Logical     Insight:    Good     Plan:     Safety Plan: No current safety concerns identified.  Recommended that patient call 911 or go to the local ED should there be a change in any of these risk factors.     Barriers to treatment: None identified    Patient Contracts (see media tab):  None    Substance Use: Provided encouragement towards sobriety    Provided support and affirmation for steps taken towards sobriety      Continue or Discharge: Patient will continue in Adult Dual Disorder Program (DDP) as planned. Patient is likely to benefit from learning and using skills as they work toward the goals identified in their treatment plan.      Liliana Olson, " [facility]  May 27, 2022

## 2022-05-31 ENCOUNTER — HOSPITAL ENCOUNTER (OUTPATIENT)
Dept: BEHAVIORAL HEALTH | Facility: CLINIC | Age: 47
Discharge: HOME | End: 2022-05-31
Attending: PSYCHIATRY & NEUROLOGY
Payer: COMMERCIAL

## 2022-05-31 ENCOUNTER — TELEPHONE (OUTPATIENT)
Dept: BEHAVIORAL HEALTH | Facility: CLINIC | Age: 47
End: 2022-05-31
Payer: COMMERCIAL

## 2022-05-31 PROCEDURE — G0177 OPPS/PHP; TRAIN & EDUC SERV: HCPCS | Mod: GT,95

## 2022-05-31 NOTE — GROUP NOTE
Process Group Note    PATIENT'S NAME: Kandy Yañez  MRN:   [id number]  :   1975  ACCT. NUMBER: [id number]  DATE OF SERVICE: 22  START TIME:  9:00 AM  END TIME:  9:50 AM  FACILITATOR: Liliana Olson LPCC  TOPIC: MH Process Group    Diagnoses:  ***    [facility] Adult Dual Diagnosis Day Treatment  TRACK: 1    NUMBER OF PARTICIPANTS: 4                                      Service Modality:  Video Visit     Telemedicine Visit: The patient's condition can be safely assessed and treated via synchronous audio and visual telemedicine encounter.      Reason for Telemedicine Visit: Services only offered telehealth    Originating Site (Patient Location): Patient's home    Distant Site (Provider Location): Provider Remote Setting- Home Office    Consent:  The patient/guardian has verbally consented to: the potential risks and benefits of telemedicine (video visit) versus in person care; bill my insurance or make self-payment for services provided; and responsibility for payment of non-covered services.     Patient would like the video invitation sent by:  My Chart    Mode of Communication:  Video Conference via Medical Zoom    As the provider I attest to compliance with applicable laws and regulations related to telemedicine.                Data:    Session content: At the start of this group, patients were invited to check in by identifying themselves, describing their current emotional status, and identifying issues to address in this group.   Area(s) of treatment focus addressed in this session included {OP BEH ADULT MH AREA OF TREATMENT FOCUS:558007}.  ***    Therapeutic Interventions/Treatment Strategies:  {OP  THERAPEUTIC INTERVENTIONS/TREATMENT:371435}    Assessment:    Patient response:   Patient responded to session by {PATIENT RESPONSE:883881}    Possible barriers to participation / learning include: {POSSIBLE BARRIERS:102549}    Health Issues:   {YES / NO:722708}       Substance Use  "Review:   Substance Use: {YES / NO:110138}    Mental Status/Behavioral Observations  Appearance:   {Appearance:454493::\"Appropriate \"}  Eye Contact:   {Eye Contact:766661::\"Good \"}  Psychomotor Behavior: {Psychomotor Behavior:162900::\"Normal \"}  Attitude:   {Attitude:825667::\"Cooperative \"}  Orientation:   {Orientation:257099::\"All\"}  Speech   Rate / Production: {Speech Rate/Production:349914::\"Normal \"}   Volume:  {Speech Volume:581717::\"Normal \"}  Mood:    {Mood:196064::\"Normal\"}  Affect:    {Affect:631189::\"Appropriate \"}  Thought Content:   {Thought Content with Safety:825563}  Thought Form:  {Thought Form:020407::\"Coherent \",\"Logical \"}    Insight:    {Insight:155625::\"Good \"}    Plan:     Safety Plan: {SAFETY PLAN:798601}     Barriers to treatment: {Barriers to Treatment:155540}    Patient Contracts (see media tab):  {Patient Contracts:331424}    Substance Use: {SUBSTANCE USE ASSESSMENT/INTERVENTION:490752}     Continue or Discharge: {Continue or Discharge:041841}      Liliana Olson, [facility]  May 31, 2022  " Cardiac Monitor/Defib/ACLS/Rescue Kit/O2/BVM

## 2022-05-31 NOTE — TELEPHONE ENCOUNTER
Writer called client to explain their absence from group today.  Writer left them a voicemail asking that they return writer's call.    Liliana Olson [facility] on 5/31/2022 at 9:50 AM

## 2022-05-31 NOTE — GROUP NOTE
Psychoeducation Group Note    PATIENT'S NAME: Kandy Yañez  MRN:   [id number]  :   1975  [id number]  DATE OF SERVICE: 22  START TIME: 11:00 AM  END TIME: 11:50 AM  FACILITATOR: Norberto Burnham RN  TOPIC:  Wellness Group: Brain Health                                    Service Modality:  Video Visit     Telemedicine Visit: The patient's condition can be safely assessed and treated via synchronous audio and visual telemedicine encounter.      Reason for Telemedicine Visit: Covid19    Originating Site (Patient Location): Patient's home    Distant Site (Provider Location): Provider Remote Setting- Home Office    Consent:  The patient/guardian has verbally consented to: the potential risks and benefits of telemedicine (video visit) versus in person care; bill my insurance or make self-payment for services provided; and responsibility for payment of non-covered services.     Patient would like the video invitation sent by:  My Chart    Mode of Communication:  Video Conference via Medical Zoom    As the provider I attest to compliance with applicable laws and regulations related to telemedicine.        [facility] Adult Dual Diagnosis Day Treatment  TRACK: DDP1    NUMBER OF PARTICIPANTS: 7    Summary of Group / Topics Discussed: PART 2  Brain Health:  Pathophysiology of Drugs on the Brain: Patients were educated on basic neuroscience and the normal role and function of the limbic system, diencephalon, cerebral cortex, and the anatomy of neurons and neurotransmitters. The natural reward circuit was discussed and the effect of drugs, how they work, and how use leads to addiction was also examined. Various pharmacologic, psychotherapeutic, and complementary treatment options were reviewed.     Patient Session Goals / Objectives:  ? Described basic neuroscience with emphasis on the reward system   ? Explained how drugs work in the brain and how chemical/substance use can lead to  addiction  ? Identified and described pharmacologic, psychotherapeutic, and complementary treatment options      Patient Participation / Response:  Fully participated with the group by sharing personal reflections / insights and openly received / provided feedback with other participants.    Demonstrated understanding of topics discussed through group discussion and participation    Treatment Plan:  Patient has a current master individualized treatment plan.  See Epic treatment plan for more information.    Norberto Burnham RN

## 2022-06-01 ENCOUNTER — HOSPITAL ENCOUNTER (OUTPATIENT)
Dept: BEHAVIORAL HEALTH | Facility: CLINIC | Age: 47
Discharge: HOME | End: 2022-06-01
Attending: PSYCHIATRY & NEUROLOGY
Payer: COMMERCIAL

## 2022-06-01 PROCEDURE — 90853 GROUP PSYCHOTHERAPY: CPT | Mod: GT,95 | Performed by: COUNSELOR

## 2022-06-01 PROCEDURE — G0177 OPPS/PHP; TRAIN & EDUC SERV: HCPCS | Mod: GT,95

## 2022-06-01 NOTE — GROUP NOTE
Psychoeducation Group Note    PATIENT'S NAME: Kandy Yañez  MRN:   [id number]  :   1975  [id number]  DATE OF SERVICE: 22  START TIME: 11:00 AM  END TIME: 11:50 AM  FACILITATOR: Virginia Lino OTR/L  TOPIC: MH Life Skills Group: Cognitive Functioning  [facility] Adult Dual Diagnosis Day Treatment  TRACK: ddp1    NUMBER OF PARTICIPANTS: 6    Service Modality:  Video Visit     Telemedicine Visit: The patient's condition can be safely assessed and treated via synchronous audio and visual telemedicine encounter.      Reason for Telemedicine Visit: Services only offered telehealth    Originating Site (Patient Location): Patient's home    Distant Site (Provider Location): Provider Remote Setting- Home Office    Consent:  The patient/guardian has verbally consented to: the potential risks and benefits of telemedicine (video visit) versus in person care; bill my insurance or make self-payment for services provided; and responsibility for payment of non-covered services.     Patient would like the video invitation sent by:  My Chart    Mode of Communication:  Video Conference via Medical Zoom    As the provider I attest to compliance with applicable laws and regulations related to telemedicine.       Summary of Group / Topics Discussed:  Life Skills: Coping Skills:  Patients discussed and practiced coping skills regarding humility, self-compassion (common humanity), and humor.  Reviewed the benefits of applying the aforementioned coping strategies.  Patients explored how these strategies might be applied to daily stressors or distressing situations. Patients practiced engaging in sharing relatable experiences to encourage common humanity, humility and humor.    Patient Session Goals / Objectives:    Understand the purpose and benefits of applying common humanity, humility, and humor as coping strategies    Apply the skill through sharing an anecdote where they experienced a sense of  imperfection and engage in the sense of humor and common humanity    Address when to use these coping skills when in distress.      Patient Participation / Response:  Minimally participated, only when prompted / asked.    Patient presentation: congruent, demonstrated understanding of topic through discussion and participation in activities. Did not engage in discussion unless directly prompted, shared minimally when prompted.     Treatment Plan:  Patient has a current master individualized treatment plan.  See Epic treatment plan for more information.    Virginia Lino, OTR/L

## 2022-06-01 NOTE — GROUP NOTE
Psychotherapy Group Note    PATIENT'S NAME: Kandy Yañez  MRN:   [id number]  :   1975  [id number]  DATE OF SERVICE: 22  START TIME: 10:00 AM  END TIME: 10:50 AM  FACILITATOR: Liliana Olson LPCC  TOPIC:  EBP Group: Emotions Management  [facility] Adult Dual Diagnosis Day Treatment  TRACK: 1    NUMBER OF PARTICIPANTS: 6                                      Service Modality:  Video Visit     Telemedicine Visit: The patient's condition can be safely assessed and treated via synchronous audio and visual telemedicine encounter.      Reason for Telemedicine Visit: Services only offered telehealth    Originating Site (Patient Location): Patient's home    Distant Site (Provider Location): Provider Remote Setting- Home Office    Consent:  The patient/guardian has verbally consented to: the potential risks and benefits of telemedicine (video visit) versus in person care; bill my insurance or make self-payment for services provided; and responsibility for payment of non-covered services.     Patient would like the video invitation sent by:  My Chart    Mode of Communication:  Video Conference via Medical Zoom    As the provider I attest to compliance with applicable laws and regulations related to telemedicine.          Summary of Group / Topics Discussed:  Emotions Management: Understanding Emotions: Patients discussed the purpose of emotions and function they serve in our lives.  Reviewed core emotions, why they happen (triggers), and how they occur. The group assisted one anothers' understanding that: emotional experiences are important; difficult emotions have a place in our lives; and the differences between various emotions.    Patient Session Goals / Objectives:    Demonstrate understanding of types various emotions    Identify and discuss specific emotions and when they occur; understand triggers    Discuss barriers to emotional regulation      Patient Participation /  Response:  Fully participated with the group by sharing personal reflections / insights and openly received / provided feedback with other participants.    Demonstrated understanding of topics discussed through group discussion and participation, Expressed understanding of the relevance / importance of emotions management skills at distressing times in life and Self-aware of experiences with difficult emotions, and strategies to employ to manage them    Treatment Plan:  Patient has a current master individualized treatment plan.  See Epic treatment plan for more information.    Liliana Olson, [facility]

## 2022-06-01 NOTE — GROUP NOTE
Process Group Note    PATIENT'S NAME: Kandy Yañez  MRN:   [id number]  :   1975  ACCT. NUMBER: [id number]  DATE OF SERVICE: 22  START TIME:  9:00 AM  END TIME:  9:50 AM  FACILITATOR: Liliana Olson [facility]  TOPIC:  Process Group    Diagnoses:  Bipolar I Disorder Current or Most Recent Episode Manic, Mild   300.02 (F41.1) Generalized Anxiety Disorder.  4. Other Diagnoses that is relevant to services:   Substance-Related & Addictive Disorders Stimulant Use Disorder: Specify current severity:  Severe  304.20 (F14.20)Amphetamine type substance, and Severe, Cocaine, Specify if: in remission, Tobacco Use Disorder, Specify current severity:  305.1(F17.200) Moderate, per history PTSD.       [facility] Adult Dual Diagnosis Day Treatment  TRACK: 1    NUMBER OF PARTICIPANTS: 5                                      Service Modality:  Video Visit     Telemedicine Visit: The patient's condition can be safely assessed and treated via synchronous audio and visual telemedicine encounter.      Reason for Telemedicine Visit: Services only offered telehealth    Originating Site (Patient Location): Patient's home    Distant Site (Provider Location): Provider Remote Setting- Home Office    Consent:  The patient/guardian has verbally consented to: the potential risks and benefits of telemedicine (video visit) versus in person care; bill my insurance or make self-payment for services provided; and responsibility for payment of non-covered services.     Patient would like the video invitation sent by:  My Chart    Mode of Communication:  Video Conference via Medical Zoom    As the provider I attest to compliance with applicable laws and regulations related to telemedicine.                Data:    Session content: At the start of this group, patients were invited to check in by identifying themselves, describing their current emotional status, and identifying issues to address in this group.   Area(s) of treatment focus  "addressed in this session included Symptom Management, Personal Safety and Abstinence/Relapse Prevention.    Kandy reported feeling \"cloudy\" today.  Her goal for the day is to get some clothes put away. Client denied additional process time but was receptive to feedback from peers and provided support and encouragement from other group peers.      Therapeutic Interventions/Treatment Strategies:  Psychotherapist offered support, feedback and validation and reinforced use of skills.    Assessment:    Patient response:   Patient responded to session by accepting feedback and giving feedback    Possible barriers to participation / learning include: and no barriers identified    Health Issues:   None reported       Substance Use Review:   Substance Use: Substance use has decreased    Mental Status/Behavioral Observations  Appearance:   Appropriate   Eye Contact:   Good   Psychomotor Behavior: Normal   Attitude:   Cooperative   Orientation:   All  Speech   Rate / Production: Normal    Volume:  Normal   Mood:    Anxious  Depressed  Irritable   Affect:    Appropriate   Thought Content:   Clear  Thought Form:  Coherent  Logical     Insight:    Good     Plan:     Safety Plan: No current safety concerns identified.  Recommended that patient call 911 or go to the local ED should there be a change in any of these risk factors.     Barriers to treatment: None identified    Patient Contracts (see media tab):  None    Substance Use: Provided encouragement towards sobriety    Provided support and affirmation for steps taken towards sobriety      Continue or Discharge: Patient will continue in Adult Dual Disorder Program (DDP) as planned. Patient is likely to benefit from learning and using skills as they work toward the goals identified in their treatment plan.      Liliana Olson, [facility]  June 1, 2022    "

## 2022-06-02 ENCOUNTER — HOSPITAL ENCOUNTER (OUTPATIENT)
Dept: BEHAVIORAL HEALTH | Facility: CLINIC | Age: 47
Discharge: HOME | End: 2022-06-02
Attending: PSYCHIATRY & NEUROLOGY
Payer: COMMERCIAL

## 2022-06-02 PROCEDURE — 90853 GROUP PSYCHOTHERAPY: CPT | Mod: GT,95 | Performed by: COUNSELOR

## 2022-06-02 PROCEDURE — G0177 OPPS/PHP; TRAIN & EDUC SERV: HCPCS | Mod: GT,95

## 2022-06-02 PROCEDURE — G0177 OPPS/PHP; TRAIN & EDUC SERV: HCPCS | Mod: GT,95 | Performed by: OCCUPATIONAL THERAPIST

## 2022-06-02 NOTE — GROUP NOTE
Process Group Note    PATIENT'S NAME: Kandy Yañez  MRN:   [id number]  :   1975  ACCT. NUMBER: [id number]  DATE OF SERVICE: 22  START TIME:  9:00 AM  END TIME:  9:50 AM  FACILITATOR: Liliana Olson [facility]  TOPIC:  Process Group    Diagnoses:  Bipolar I Disorder Current or Most Recent Episode Manic, Mild   300.02 (F41.1) Generalized Anxiety Disorder.  4. Other Diagnoses that is relevant to services:   Substance-Related & Addictive Disorders Stimulant Use Disorder: Specify current severity:  Severe  304.20 (F14.20)Amphetamine type substance, and Severe, Cocaine, Specify if: in remission, Tobacco Use Disorder, Specify current severity:  305.1(F17.200) Moderate, per history PTSD.       [facility] Adult Dual Diagnosis Day Treatment  TRACK: 1    NUMBER OF PARTICIPANTS: 5                                      Service Modality:  Video Visit     Telemedicine Visit: The patient's condition can be safely assessed and treated via synchronous audio and visual telemedicine encounter.      Reason for Telemedicine Visit: Services only offered telehealth    Originating Site (Patient Location): Patient's home    Distant Site (Provider Location): Provider Remote Setting- Home Office    Consent:  The patient/guardian has verbally consented to: the potential risks and benefits of telemedicine (video visit) versus in person care; bill my insurance or make self-payment for services provided; and responsibility for payment of non-covered services.     Patient would like the video invitation sent by:  My Chart    Mode of Communication:  Video Conference via Medical Zoom    As the provider I attest to compliance with applicable laws and regulations related to telemedicine.                Data:    Session content: At the start of this group, patients were invited to check in by identifying themselves, describing their current emotional status, and identifying issues to address in this group.   Area(s) of treatment focus  addressed in this session included Symptom Management, Personal Safety and Abstinence/Relapse Prevention.    Kandy reported feeling very tired today.  Her goal for the day is to put away her laundry.  Client denied additional process time but was receptive to feedback from peers and provided support and encouragement from other group peers.       Therapeutic Interventions/Treatment Strategies:  Psychotherapist offered support, feedback and validation and reinforced use of skills.    Assessment:    Patient response:   Patient responded to session by accepting feedback, giving feedback and listening    Possible barriers to participation / learning include: and no barriers identified    Health Issues:   None reported       Substance Use Review:   Substance Use: Substance use has decreased    Mental Status/Behavioral Observations  Appearance:   Appropriate   Eye Contact:   Good   Psychomotor Behavior: Normal   Attitude:   Cooperative   Orientation:   All  Speech   Rate / Production: Normal    Volume:  Normal   Mood:    Anxious   Affect:    Appropriate   Thought Content:   Clear  Thought Form:  Coherent  Logical     Insight:    Good     Plan:     Safety Plan: No current safety concerns identified.  Recommended that patient call 911 or go to the local ED should there be a change in any of these risk factors.     Barriers to treatment: None identified    Patient Contracts (see media tab):  None    Substance Use: Provided encouragement towards sobriety    Provided support and affirmation for steps taken towards sobriety      Continue or Discharge: Patient will continue in Adult Dual Disorder Program (DDP) as planned. Patient is likely to benefit from learning and using skills as they work toward the goals identified in their treatment plan.      Liliana Olson, [facility]  June 2, 2022

## 2022-06-02 NOTE — GROUP NOTE
Psychoeducation Group Note    PATIENT'S NAME: Kandy Yañez  MRN:   [id number]  :   1975  ACCT. NUMBER: [id number]  DATE OF SERVICE: 22  START TIME: 10:00 AM  END TIME: 10:50 AM  FACILITATOR: Joana Hubbard OTR/L  TOPIC: MH Life Skills Group: Cognitive Functioning  [facility] Adult Dual Diagnosis Day Treatment  TRACK: 1    NUMBER OF PARTICIPANTS: 6                                      Service Modality:  Video Visit     Telemedicine Visit: The patient's condition can be safely assessed and treated via synchronous audio and visual telemedicine encounter.      Reason for Telemedicine Visit: Services only offered telehealth    Originating Site (Patient Location): Patient's home    Distant Site (Provider Location): [facility]    Consent:  The patient/guardian has verbally consented to: the potential risks and benefits of telemedicine (video visit) versus in person care; bill my insurance or make self-payment for services provided; and responsibility for payment of non-covered services.     Patient would like the video invitation sent by:  My Chart    Mode of Communication:  Video Conference via Medical Zoom    As the provider I attest to compliance with applicable laws and regulations related to telemedicine.         Summary of Group / Topics Discussed:  Cognitive Functioning: Patients were taught and provided with an opportunity to gain awareness of how their mental health symptoms impact their current cognitive functioning as well as how this impacts their performance and participation in meaningful roles, relationships, and routines.  Patients were taught skills and strategies on how to monitor and improve cognitive performance through remediation or compensatory strategies.  Patients were given opportunities to practice taught skills and techniques in session and how to apply to everyday life.      Patient Session Goals /  Objectives:    Identified how their mental health symptoms impact their functioning, focusing on specific cognitive challenges     Improved awareness of specific remediation and/or compensatory strategies to improve  executive functioning skills and how this relates to mental health recovery        Established a plan for practice of these skills in their own environments    Practiced and reflected on how to generalize taught skills to their everyday life          Patient Participation / Response:  Fully participated with the group by sharing personal reflections / insights and openly received / provided feedback with other participants.    Patient presentation: active in group discussion sharing some ideas with the group, Verbalized understanding of content and Patient would benefit from additional opportunities to practice the content to be able to generalize it to their everyday life with increased intentionality, consistency, and efficacy in support of their psychiatric recovery    Treatment Plan:  Patient has a current master individualized treatment plan.  See Epic treatment plan for more information.    Joana Hubbard, OTR/L

## 2022-06-02 NOTE — GROUP NOTE
Psychoeducation Group Note    PATIENT'S NAME: Kandy Yañez  MRN:   [id number]  :   1975  [id number]  DATE OF SERVICE: 22  START TIME: 11:00 AM  END TIME: 11:50 AM  FACILITATOR: Norberto Burnham RN  TOPIC:  Wellness Group: Medication Education and Management                                    Service Modality:  Video Visit     Telemedicine Visit: The patient's condition can be safely assessed and treated via synchronous audio and visual telemedicine encounter.      Reason for Telemedicine Visit: Covid19    Originating Site (Patient Location): Patient's home    Distant Site (Provider Location): Provider Remote Setting- Home Office    Consent:  The patient/guardian has verbally consented to: the potential risks and benefits of telemedicine (video visit) versus in person care; bill my insurance or make self-payment for services provided; and responsibility for payment of non-covered services.     Patient would like the video invitation sent by:  My Chart    Mode of Communication:  Video Conference via Medical Zoom    As the provider I attest to compliance with applicable laws and regulations related to telemedicine.        [facility] Adult Dual Diagnosis Day Treatment  TRACK: DDP1    NUMBER OF PARTICIPANTS: 6    Summary of Group / Topics Discussed:  Medication Educations and Management:  Medication Assessment/Review: Patients were given a medication quiz to assess their current knowledge about the medications that are prescribed and why they are taking them. Medication lists were reviewed with each patient individually to provide education and answer questions. Safe medication storage, handling, management, and disposal were discussed within the group. Patients completed medication wallet cards    Patient Session Goals / Objectives:  ? Assessed patient understanding of their current medications and indication  ? Identify what to do if a dose is missed  ? Assessed medication  adherence  ? Assessed knowledge of medication side effects and how to treat them      Patient Participation / Response:  Fully participated with the group by sharing personal reflections / insights and openly received / provided feedback with other participants.     Demonstrated understanding of topics discussed through group discussion and participation    Treatment Plan:  Patient has a current master individualized treatment plan.  See Epic treatment plan for more information.    Norberto Burnham RN

## 2022-06-03 ENCOUNTER — HOSPITAL ENCOUNTER (OUTPATIENT)
Dept: BEHAVIORAL HEALTH | Facility: CLINIC | Age: 47
Discharge: HOME | End: 2022-06-03
Attending: PSYCHIATRY & NEUROLOGY
Payer: COMMERCIAL

## 2022-06-03 PROCEDURE — 90853 GROUP PSYCHOTHERAPY: CPT | Mod: GT,95 | Performed by: COUNSELOR

## 2022-06-03 PROCEDURE — G0177 OPPS/PHP; TRAIN & EDUC SERV: HCPCS | Mod: GT,95 | Performed by: OCCUPATIONAL THERAPIST

## 2022-06-03 ASSESSMENT — COLUMBIA-SUICIDE SEVERITY RATING SCALE - C-SSRS
1. SINCE LAST CONTACT, HAVE YOU WISHED YOU WERE DEAD OR WISHED YOU COULD GO TO SLEEP AND NOT WAKE UP?: NO
6. HAVE YOU EVER DONE ANYTHING, STARTED TO DO ANYTHING, OR PREPARED TO DO ANYTHING TO END YOUR LIFE?: NO
SUICIDE, SINCE LAST CONTACT: NO
ATTEMPT SINCE LAST CONTACT: NO
TOTAL  NUMBER OF ABORTED OR SELF INTERRUPTED ATTEMPTS SINCE LAST CONTACT: NO
TOTAL  NUMBER OF INTERRUPTED ATTEMPTS SINCE LAST CONTACT: NO
2. HAVE YOU ACTUALLY HAD ANY THOUGHTS OF KILLING YOURSELF?: NO

## 2022-06-03 NOTE — GROUP NOTE
Psychoeducation Group Note    PATIENT'S NAME: Kandy Yañez  MRN:   [id number]  :   1975  ACCT. NUMBER: [id number]  DATE OF SERVICE: 22  START TIME: 11:00 AM  END TIME: 11:50 AM  FACILITATOR: Joana Hubbard OTR/L  TOPIC: MH Life Skills Group: Lifestyle Balance and Structure  [facility] Adult Dual Diagnosis Day Treatment  TRACK: 1    NUMBER OF PARTICIPANTS: 5                                      Service Modality:  Video Visit     Telemedicine Visit: The patient's condition can be safely assessed and treated via synchronous audio and visual telemedicine encounter.      Reason for Telemedicine Visit: Services only offered telehealth    Originating Site (Patient Location): Patient's home    Distant Site (Provider Location): [facility]    Consent:  The patient/guardian has verbally consented to: the potential risks and benefits of telemedicine (video visit) versus in person care; bill my insurance or make self-payment for services provided; and responsibility for payment of non-covered services.     Patient would like the video invitation sent by:  My Chart    Mode of Communication:  Video Conference via Medical Zoom    As the provider I attest to compliance with applicable laws and regulations related to telemedicine.         Summary of Group / Topics Discussed:  Lifestyle Balance and Structure:  Weekend Wellness: The group focused on reflecting on the past week and identifying insights and positive experiences that they want to build upon further.  The group also focused on identifying needs and any concerns for the upcoming weekend and created a list of activities and tasks that they might engage in to help support themselves and add structure their weekend.  Facilitated the sharing of individual insights and plans with validation, support, and feedback provided.    Patient Session Goals / Objectives:    Reflected on insights learned and  positive experiences over the last week to help with their recovery and wellbeing    Identified needs and concerns for the upcoming weekend and identified ways to address these    Created a written list of possible ways to structure their weekend    Identified plan to support follow through on plans and reflection on progress made             Patient Participation / Response:  Fully participated with the group by sharing personal reflections / insights and openly received / provided feedback with other participants.    Patient presentation: active in group sharing plans for the weekend; no safety concerns reported;  good sense of humor, Verbalized understanding of content and Patient would benefit from additional opportunities to practice the content to be able to generalize it to their everyday life with increased intentionality, consistency, and efficacy in support of their psychiatric recovery    Treatment Plan:  Patient has See Epic Treatment Plan - Patient is discharging.  Kandy will be starting in Day Treatment next week for additional support/treatment.     Joana Hubbard OTR/L

## 2022-06-03 NOTE — DISCHARGE SUMMARY
Adult Dual Disorder Program   Discharge Summary/Instructions     Patient: Kandy Yañez MRN: [id number]  : 1975 Age:  46 year old Sex:  female    Admission Date: 22  Discharge Date: 6/3/22  Diagnosis:   Bipolar I Disorder Current or Most Recent Episode Manic, Mild   300.02 (F41.1) Generalized Anxiety Disorder.  4. Other Diagnoses that is relevant to services:   Substance-Related & Addictive Disorders Stimulant Use Disorder: Specify current severity:  Severe  304.20 (F14.20)Amphetamine type substance, and Severe, Cocaine, Specify if: in remission, Tobacco Use Disorder, Specify current severity:  305.1(F17.200) Moderate, per history PTSD.       Focus of Treatment / Discharge Recommendations: You have completed the Dual Disorder IOP and have been referred to Adult Day Treatment.  You will be in the 3A group which meets , , and  from 9am-12pm.  Please also continue to take your medications daily, maintain sobriety, and establish care with individual providers.      Personal Safety/ Management of Symptoms:    * Follow your safety plan.  Report increased symptoms to your care team and/or use the crisis resources listed below.    Crisis Resources:    Suicide Prevention Lifeline: 6-554-020-TALK (1501)    Crisis Text Line Service (available 24 hours a day, 7 days a week): Text [place] to 009351    Call  **CRISIS (720946) from a cell phone to talk to a team of professionals who can help you.  Crisis Services By [facility]: Phone Number:   [place]     [phone]   [place]    [phone]   [place]    [phone]   [place]    [phone]   [place]    [phone]   [place] [phone]   [place]     [phone]       Call 911 or go to my nearest emergency department.     Managing Symptoms / Abstinence / Preventing Relapse:    Take all medicines as directed.  Carry a current list of medicines with you.    Use coping skills: mindfulness, breathing, grounding, etc.    Do not use illicit (street)  drugs, controlled substances (narcotics) or alcohol.    Go to all appointments.    Report symptoms to your care team including: thoughts of suicide, loss of sleep, increased confusion, mood getting worse, feeling more aggressive, or substance use.    Develop/Improve Independent Living/Socialization Skills: Maintain daily structure/routine, take medications as prescribed, use your support system, maintain healthy boundaries with ex (and other men).     Community Resources/Supports and Discharge Planning:      Follow up with psychiatrist / main caregiver: N/A    Next visit: N/A    Follow up with your therapist: N/A   Next visit: N/A    Go to group therapy and / or support groups at: [facility]T 3A.  Also continue to go to your AA/NA meetings.     See your medical doctor about:  Any physical health concerns    Other:  N/A    Copy of summary sent to: Sent to client via [employer] and also probation      Client Signature:__unavailable to sign due to COVID-19_______________________________   Date / Time:___________    Staff Signature:___Liliana Olson [facility] on 6/3/2022 at 10:18 AM  _______________________________   Date / Time:___________

## 2022-06-03 NOTE — GROUP NOTE
Process Group Note    PATIENT'S NAME: Kandy Yañez  MRN:   [id number]  :   1975  ACCT. NUMBER: [id number]  DATE OF SERVICE: 22  START TIME:  9:00 AM  END TIME:  9:50 AM  FACILITATOR: Liliana Olson [facility]  TOPIC:  Process Group    Diagnoses:  Bipolar I Disorder Current or Most Recent Episode Manic, Mild   300.02 (F41.1) Generalized Anxiety Disorder.  4. Other Diagnoses that is relevant to services:   Substance-Related & Addictive Disorders Stimulant Use Disorder: Specify current severity:  Severe  304.20 (F14.20)Amphetamine type substance, and Severe, Cocaine, Specify if: in remission, Tobacco Use Disorder, Specify current severity:  305.1(F17.200) Moderate, per history PTSD.       [facility] Adult Dual Diagnosis Day Treatment  TRACK: 1    NUMBER OF PARTICIPANTS: 6                                      Service Modality:  Video Visit     Telemedicine Visit: The patient's condition can be safely assessed and treated via synchronous audio and visual telemedicine encounter.      Reason for Telemedicine Visit: Services only offered telehealth    Originating Site (Patient Location): Patient's home    Distant Site (Provider Location): Provider Remote Setting- Home Office    Consent:  The patient/guardian has verbally consented to: the potential risks and benefits of telemedicine (video visit) versus in person care; bill my insurance or make self-payment for services provided; and responsibility for payment of non-covered services.     Patient would like the video invitation sent by:  My Chart    Mode of Communication:  Video Conference via Medical Zoom    As the provider I attest to compliance with applicable laws and regulations related to telemedicine.                Data:    Session content: At the start of this group, patients were invited to check in by identifying themselves, describing their current emotional status, and identifying issues to address in this group.   Area(s) of treatment focus  addressed in this session included Symptom Management, Personal Safety and Abstinence/Relapse Prevention.    Kandy reported feeling good today.  Her goal today is to complete her MADD panel.  Client denied additional process time but was receptive to feedback from peers and provided support and encouragement from other group peers.       Therapeutic Interventions/Treatment Strategies:  Psychotherapist offered support, feedback and validation and reinforced use of skills.    Assessment:    Patient response:   Patient responded to session by accepting feedback, giving feedback and listening    Possible barriers to participation / learning include: and no barriers identified    Health Issues:   None reported       Substance Use Review:   Substance Use: Substance use has decreased    Mental Status/Behavioral Observations  Appearance:   Appropriate   Eye Contact:   Good   Psychomotor Behavior: Normal   Attitude:   Cooperative   Orientation:   All  Speech   Rate / Production: Normal    Volume:  Normal   Mood:    Anxious   Affect:    Appropriate   Thought Content:   Clear  Thought Form:  Coherent  Logical     Insight:    Good     Plan:     Safety Plan: No current safety concerns identified.  Recommended that patient call 911 or go to the local ED should there be a change in any of these risk factors.     Barriers to treatment: None identified    Patient Contracts (see media tab):  None    Substance Use: Provided encouragement towards sobriety    Provided support and affirmation for steps taken towards sobriety      Continue or Discharge: Patient will continue in Adult Dual Disorder Program (DDP) as planned. Patient is likely to benefit from learning and using skills as they work toward the goals identified in their treatment plan.      Liliana Olson, [facility]  D[place]ne 3, 2022

## 2022-06-03 NOTE — PROGRESS NOTES
"TRANSFER SBAR NOTE  Adult  Outpatient Programs          SITUATION:     Admission Date: 6/3/2022    Patient name:  Kandy Yañez  Preferred name: Kandy She/Her/Hers/Herself 46 year old  Diagnosis:   (F31.9) Bipolar I disorder (H)  Comment:     Plan: Group psychotherapy, May discharge when: other         (specify in comments) Discharge from program         based on clinical judgment of the mental health        professional, [place] to Outpatient     New assigned Program/Track: ADT 3A  Is this a new Level of Care? Yes  NOTE: LOC: PHP, IOP/Day Tx, \"Clinic\". If yes, LOCUS and Discharge Summary required.     Reviewed patient's schedule and informed them of any variation due to late days (PHP) or Holidays. yes  Does the patient have any planned absences and/or barriers to admission/treatment? no  NOTE: impact of transportation, technology, childcare, work, or housing concerns.    Insurance: [facility]or[facility] Product Type: HMO /  Changes/Concerns: no      BACKGROUND:     Patient's stated goal/reason for treatment (copy from DA or Treatment plan; confirm with patient): \"to stop using\"    Provide additional information regarding clinical reasons and goals identified for continued care into next program or level of care? Needs continued support with sobriety and managing mental health.       ASSESSMENT:     Please consult  if any of the following concerns may impact admission/participation in program.    Is there a Safety Plan? yes    Safety status/concerns: N/A     New Information since original Admission SBAR:  Continued Substance Use Status concerns: yes - 2 months sober from meth  Ongoing Pertinent Medical/Nutritional concerns: no  Any concerns related to use of telehealth, ROIs, e-HOLGER, emergency contacts: no      Does patient have FMLA or Short-Term Disability requests/plans? no  NOTE: Whenever possible, FMLA or Short-Term Disability paperwork needs to be managed/completed by the " patient's community provider.   Exceptions: Patient does not have a community provider AND request is specific to mental health and time off for the duration of the program participation.    Notify RN Triage as soon as possible.     Care Providers/Medication Management Needs:   Is Dr. Hines providing care?  yes  Is there a need (requirement or for continued patient care) for this to continue in next program/level of care: yes    See information listed in Discharge Instructions or Original Admission Note related to Community or other MH providers.      Provide any additional background information pertinent for continued care (areas of focus and useful approaches): N/A        RECOMMENDATIONS:     Patient Transfer Completed: yes    Care Team, referrals made/needed: no  PCP: No Ref-Primary, Physician  NOTE: Notify RN, as needed, to make internal referrals.                                                             Completed by: Liliana Olson [facility]

## 2022-06-03 NOTE — PROGRESS NOTES
LOCUS Worksheet     Name: Kandy Yañez MRN: [id number]    : 1975      Gender:  female    PMI:  [id number]   Provider Name: JORGE Will [facility]   Provider NPI:  [id number]    Actual level of Care Provided:  Dual Disorder IOP    Service(s) receiving or referred to:  Adult Day Treatment     Reason for Variance: Discharging to a lower level of care      Rating completed by:       I. Risk of Harm:   2      Low Risk of Harm    II. Functional Status:   2      Mild Impairment    III. Co-Morbidity:   2      Minor Co-Morbidity    IV - A. Recovery Environment - Level of Stress:   2      Mildly Stressful Environment    IV - B. Recovery Environment - Level of Support:   2      Supportive Environment    V. Treatment and Recovery History:   2      Significant Response to Treatment and Recovery Management    VI. Engagement and Recovery Project:   2      Positive Engagement and Recovery       14 Composite Score    Level of Care Recommendation:   14 to 16       Low Intensity Community Based Services

## 2022-06-03 NOTE — GROUP NOTE
Psychotherapy Group Note    PATIENT'S NAME: Kandy Yañez  MRN:   [id number]  :   1975  [id number]  DATE OF SERVICE: 22  START TIME: 10:00 AM  END TIME: 10:50 AM  FACILITATOR: Liliana Olson LPCC  TOPIC:  EBP Group: DDP Relapse Prevention  [facility] Adult Dual Diagnosis Day Treatment  TRACK: 1    NUMBER OF PARTICIPANTS: 5                                      Service Modality:  Video Visit     Telemedicine Visit: The patient's condition can be safely assessed and treated via synchronous audio and visual telemedicine encounter.      Reason for Telemedicine Visit: Services only offered telehealth    Originating Site (Patient Location): Patient's home    Distant Site (Provider Location): Provider Remote Setting- Home Office    Consent:  The patient/guardian has verbally consented to: the potential risks and benefits of telemedicine (video visit) versus in person care; bill my insurance or make self-payment for services provided; and responsibility for payment of non-covered services.     Patient would like the video invitation sent by:  My Chart    Mode of Communication:  Video Conference via Medical Zoom    As the provider I attest to compliance with applicable laws and regulations related to telemedicine.          Summary of Group / Topics Discussed:  DDP Relapse Prevention: Observing and Describing Process of Relapse: Patients explored the process of relapse and what individual factors can contribute to relapse. This will assist patients in recognizing that relapse is a process that often begins well before the actual use of a substance. Patients discussed their own personal vulnerabilities, emotions, cravings, urges, situations, and thoughts that may lead to a relapse and what has led to past relapses.     Patient Session Goals / Objectives:    Verbalized understanding the importance of awareness of factors that contribute to relapse     Verbalized understanding that relapse is  a process    Shared personal vulnerabilities, thoughts, emotions, and situations that may lead to relapse     Described skills to manage factors that contribute to relapse       Patient Participation / Response:  Fully participated with the group by sharing personal reflections / insights and openly received / provided feedback with other participants.    Demonstrated understanding of topics discussed through group discussion and participation, Demonstrated understanding of utilizing relapse prevention skills to manage urges and maintain sobriety and Identified / Expressed personal readiness to utilize relapse prevention skills    Treatment Plan:  Patient has a current master individualized treatment plan.  See Epic treatment plan for more information.    Liliana Olson, [facility]

## 2022-06-06 ENCOUNTER — TELEPHONE (OUTPATIENT)
Dept: BEHAVIORAL HEALTH | Facility: CLINIC | Age: 47
End: 2022-06-06
Payer: COMMERCIAL

## 2022-06-06 NOTE — TELEPHONE ENCOUNTER
Call placed to pt who was scheduled to start 3A group.  LVM with call back number and requested pt call me back to let us know whether or not she wanted to start the program.  As of 1700- no call back received.

## 2022-06-09 ENCOUNTER — TELEPHONE (OUTPATIENT)
Dept: BEHAVIORAL HEALTH | Facility: CLINIC | Age: 47
End: 2022-06-09
Payer: COMMERCIAL

## 2022-06-13 NOTE — PROGRESS NOTES
"    Admission SBAR NOTE  Adult  Outpatient Programs          SITUATION:     Admission Date: 6/14/2022    Provider verified identity through the following two step process.  Patient provided: verbal spelling of full first and last name and Patient is known previously to provider    Patient name:  Kandy Yañez  Preferred name: Kandy She/Her/Hers/Herself 46 year old  Diagnosis/-es (copy from , including ICD-10):     Bipolar I Disorder Current or Most Recent Episode Manic, Mild   300.02 (F41.1) Generalized Anxiety Disorder.  4. Other Diagnoses that is relevant to services:   Substance-Related & Addictive Disorders Stimulant Use Disorder: Specify current severity:  Severe  304.20 (F14.20)Amphetamine type substance, and Severe, Cocaine, Specify if: in remission, Tobacco Use Disorder, Specify current severity:  305.1(F17.200) Moderate, per history PTSD.       Assigned Program/Track: DDP 1    Reviewed patient's schedule and informed them of any variation due to late days (PHP) or Holidays. yes    Does the patient have any planned absences and/or barriers to admission/treatment? no  NOTE: impact of transportation, technology, childcare, work, or housing concerns.    Insurance: Payor: [facility] / [facility] / Product Type: HMO /  Changes/Concerns: no    Does patient need an appointment with the program provider? no  NOTE: If yes, please schedule provider visit.      BACKGROUND:     Patient's stated goal/reason for treatment (copy from ; confirm with patient): \"get back on track after relapse\"      ASSESSMENT:     Please consult  if any of the following concerns may impact admission/participation in program:     PHQ, JAYLIN and PROMIS done within 7 days OR send upon admission if over 7 days.      Paragon Suicide Severity Rating (select Lifetime/Recent):   Paragon Suicide Severity Rating Scale (Short Version)  Paragon Suicide Severity Rating (Short Version) 3/21/2022 6/3/2022   1. Wish to be Dead " (Since Last Contact) 0 0   2. Non-Specific Active Suicidal Thoughts (Since Last Contact) 0 0   Actual Attempt (Since Last Contact) 0 0   Has subject engaged in non-suicidal self-injurious behavior? (Since Last Contact) 0 0   Interrupted Attempts (Since Last Contact) 0 0   Aborted or Self-Interrupted Attempt (Since Last Contact) 0 0   Preparatory Acts or Behavior (Since Last Contact) 0 0   Suicide (Since Last Contact) 0 0   Calculated C-SSRS Risk Score (Since Last Contact) No Risk Indicated No Risk Indicated       LOCUS Composite Ratin   High Intensity Community Based Services: 17 to 19 Composite Rating (Day Tx or IOP)   Medically Monitored Non-Residential Services: 20 to 22 Composite Rating (PHP or IOP)   NOTE: if composite rating does not match recommendation, please notify  that a variance may be required.      Copy/Paste current Safety Plan to the BEH TX PLAN ENCOUNTER. yes    Safety status/concerns: N/A     Substance use concerns: yes  NOTE: if no substance use concerns, OK to delete below:     Patient reported last use of substances as: 6/10/22 (meth).     Patient reports/presents withdrawal/intoxication concerns: yes    Pertinent Medical/Nutritional concerns: no    Review Tele-Health Requirements (including secure environment, confidentiality, in-state status, equipment needs and process - encourage MyChart): yes    Paper or Docusign requirements for ROIs, e-HOLGER, emergency contact, etc have been completed? yes  If not, do upon admission.     Confirm Emergency Contact listed in the SnapShot/Demographics with patient and notify OBC if an update is required. yes    Does patient have FMLA or Short-Term Disability requests/plans? no  NOTE: Whenever possible, FMLA or Short-Term Disability paperwork needs to be managed/completed by the patient's community provider.   Exceptions: Patient does not have a community provider AND request is specific to mental health and time off for the duration of the  program participation.    Notify RN Triage as soon as possible.     Care Providers/Medication Management Needs:     Does patient have a current community or other [facility] provider prescribing medications for mental health? no  NOTE: Delete below if not applicable:    Psychiatric Provider (or PCP if managing MH meds)/Name: N/A  Clinic name/location:   Last appointment:    Next appointment:       NOTE: Inform patients, program is temporary and we will not be transferring care. Patient's should continue to see their community provider.       Individual Therapist/Name:  N/A  Clinic name/location:   Last appointment:    Next appointment:        Patient will continue to see above provider while participating in program: N/A        RECOMMENDATIONS:     Patient Admission Completed: yes    Care Team, referrals made/needed: yes  PCP: No Ref-Primary, Physician  NOTE: Notify RN, as needed, to make internal referrals.                                                             Completed by: Liliana Olson [facility]

## 2022-06-13 NOTE — ADDENDUM NOTE
Encounter addended by: Liliana Olson [facility] on: 6/13/2022 12:00 PM   Actions taken: Clinical Note Signed

## 2022-06-13 NOTE — PROGRESS NOTES
Kandy completed the Dual Disorder IOP on 6/3 and was scheduled to start an Adult Day Treatment group the following Monday, 6/6.  Kandy did not attend group on 6/6 and was unable to be reached by program staff.  She continued to be absent for the rest of the week and did not return staff phone calls.  Writer called her emergency contact (mother) on 6/9 and her mother reported that Kandy had left the house on 6/3 and had not been seen or heard from since.  Her mother suspected she met up with her ex-boyfriend and relapsed on meth.  On 6/6, Kandy called writer an confirmed that she had relapsed but was back home and wanting to re-engage with programming.  She reported that she had stopped using and was re-committed to sobriety.  After discussing her case with program staff, it was determined that Kandy will return to the Dual Disorder IOP for the time being to stabilize her sobriety and then if she continues to be able to maintain her sobriety, transition to Day Treatment as originally planned in a few weeks.    Liliana Olson, [facility] on 6/13/2022 at 12:00 PM

## 2022-06-14 ENCOUNTER — HOSPITAL ENCOUNTER (OUTPATIENT)
Dept: BEHAVIORAL HEALTH | Facility: CLINIC | Age: 47
Discharge: HOME | End: 2022-06-14
Attending: PSYCHIATRY & NEUROLOGY
Payer: COMMERCIAL

## 2022-06-14 ENCOUNTER — TELEPHONE (OUTPATIENT)
Dept: BEHAVIORAL HEALTH | Facility: CLINIC | Age: 47
End: 2022-06-14
Payer: COMMERCIAL

## 2022-06-14 PROCEDURE — 90853 GROUP PSYCHOTHERAPY: CPT | Mod: GT,95 | Performed by: COUNSELOR

## 2022-06-14 PROCEDURE — G0177 OPPS/PHP; TRAIN & EDUC SERV: HCPCS | Mod: GT,95

## 2022-06-14 NOTE — GROUP NOTE
Psychoeducation Group Note    PATIENT'S NAME: Kandy Yañez  MRN:   [id number]  :   1975  ACCT. NUMBER: [id number]  DATE OF SERVICE: 22  START TIME: 11:00 AM  END TIME: 11:50 AM  FACILITATOR: Norberto Burnham RN  TOPIC:  Wellness Group: [facility]                                    Service Modality:  Video Visit     Telemedicine Visit: The patient's condition can be safely assessed and treated via synchronous audio and visual telemedicine encounter.      Reason for Telemedicine Visit: Covid19    Originating Site (Patient Location): Patient's home    Distant Site (Provider Location): Provider Remote Setting- Home Office    Consent:  The patient/guardian has verbally consented to: the potential risks and benefits of telemedicine (video visit) versus in person care; bill my insurance or make self-payment for services provided; and responsibility for payment of non-covered services.     Patient would like the video invitation sent by:  My Chart    Mode of Communication:  Video Conference via Medical Zoom    As the provider I attest to compliance with applicable laws and regulations related to telemedicine.        [facility] Adult Dual Diagnosis Day Treatment  TRACK: DDP1    NUMBER OF PARTICIPANTS: 7    Summary of Group / Topics Discussed:  Foundations of Health: Nutrition: MICD nutrition: Patients were provided education on the role of nutrition in the body and all of the functions that nutrition influences including energy, body structure and bodily function as overarching categories. Select macronutrients and micronutrients and their important roles and functions were also reviewed. Chemical and substance use disrupt how we eat, how our organs behave, and what our body does with the food that we do eat. Patients were educated on the effects that chemicals have on nutritional status and ways in which nutrition can be promoted while in recovery.      Patient Session Goals /  Objectives:  ? Identified hunger as a factor that can increase risk for chemical/substance relapse  ? Described the role of macronutrients and micronutrients in the body  ? Explained the effects of chemicals/substances on nutrition  ? Listed strategies for promoting balanced nutrition to promote wellness and recovery      Patient Participation / Response:  Fully participated with the group by sharing personal reflections / insights and openly received / provided feedback with other participants.    Demonstrated understanding of topics discussed through group discussion and participation    Treatment Plan:  Patient has an initial individualized treatment plan that was created as part of their diagnostic assessment / admission process.  A master individualized treatment plan is in the process of being developed with the patient and multi-disciplinary care team.    Norberto Burnham RN

## 2022-06-14 NOTE — GROUP NOTE
Process Group Note    PATIENT'S NAME: Kandy Yañez  MRN:   [id number]  :   1975  ACCT. NUMBER: [id number]  DATE OF SERVICE: 22  START TIME:  9:00 AM  END TIME:  9:50 AM  FACILITATOR: Liliana Olson [facility]  TOPIC:  Process Group    Diagnoses:  Bipolar I Disorder Current or Most Recent Episode Manic, Mild   300.02 (F41.1) Generalized Anxiety Disorder.  4. Other Diagnoses that is relevant to services:   Substance-Related & Addictive Disorders Stimulant Use Disorder: Specify current severity:  Severe  304.20 (F14.20)Amphetamine type substance, and Severe, Cocaine, Specify if: in remission, Tobacco Use Disorder, Specify current severity:  305.1(F17.200) Moderate, per history PTSD.       [facility] Adult Dual Diagnosis Day Treatment  TRACK: 1    NUMBER OF PARTICIPANTS: 5                                      Service Modality:  Video Visit     Telemedicine Visit: The patient's condition can be safely assessed and treated via synchronous audio and visual telemedicine encounter.      Reason for Telemedicine Visit: Services only offered telehealth    Originating Site (Patient Location): Patient's home    Distant Site (Provider Location): Provider Remote Setting- Home Office    Consent:  The patient/guardian has verbally consented to: the potential risks and benefits of telemedicine (video visit) versus in person care; bill my insurance or make self-payment for services provided; and responsibility for payment of non-covered services.     Patient would like the video invitation sent by:  My Chart    Mode of Communication:  Video Conference via Medical Zoom    As the provider I attest to compliance with applicable laws and regulations related to telemedicine.                Data:    Session content: At the start of this group, patients were invited to check in by identifying themselves, describing their current emotional status, and identifying issues to address in this group.   Area(s) of treatment focus  "addressed in this session included Symptom Management, Personal Safety and Abstinence/Relapse Prevention.    Kandy reported feeling \"overwhelmed\" today because it's her first day back after her relapse.  Her goal today is to not beat herself up and challenge her negative self-talk.  She shared about what led her to relapse and how she was able to get herself back on track. She felt like she \"saw it coming\" but didn't stop it and is having a really hard time not beating herself up.     Therapeutic Interventions/Treatment Strategies:  Psychotherapist offered support, feedback and validation and reinforced use of skills. Treatment modalities used include Motivational Interviewing, Cognitive Behavioral Therapy and Dialectical Behavioral Therapy. Interventions include Relapse Prevention: Facilitated understanding the importance of awareness of factors that contribute to relapse , Assisted patient in identifying personal vulnerabilities, thoughts, emotions, and situations that may lead to relapse  and Coached on skills to manage factors that contribute to relapse.    Assessment:    Patient response:   Patient responded to session by accepting feedback, giving feedback and listening    Possible barriers to participation / learning include: and no barriers identified    Health Issues:   None reported       Substance Use Review:   Substance Use: methamphetamine .  and Last use: 6/9/22    Mental Status/Behavioral Observations  Appearance:   Appropriate   Eye Contact:   Good   Psychomotor Behavior: Normal   Attitude:   Cooperative   Orientation:   All  Speech   Rate / Production: Normal    Volume:  Normal   Mood:    Anxious  Depressed   Affect:    Tearful  Thought Content:   Clear  Thought Form:  Coherent  Logical     Insight:    Good     Plan:     Safety Plan: No current safety concerns identified.  Recommended that patient call 911 or go to the local ED should there be a change in any of these risk factors.     Barriers to " treatment: None identified    Patient Contracts (see media tab):  None    Substance Use: Provided encouragement towards sobriety    Provided support and affirmation for steps taken towards sobriety      Continue or Discharge: Patient will continue in Adult Dual Disorder Program (DDP) as planned. Patient is likely to benefit from learning and using skills as they work toward the goals identified in their treatment plan.      Liliana Olson, [facility]  June 14, 2022

## 2022-06-14 NOTE — GROUP NOTE
Psychotherapy Group Note    PATIENT'S NAME: Kandy Yañez  MRN:   [id number]  :   1975  ACCT. NUMBER: [id number]  DATE OF SERVICE: 22  START TIME: 10:00 AM  END TIME: 10:50 AM  FACILITATOR: Liliana Olson LPCC  TOPIC: MH EBP Group: Specialty Awareness  [facility] Adult Dual Diagnosis Day Treatment  TRACK: 1    NUMBER OF PARTICIPANTS: 7                                      Service Modality:  Video Visit     Telemedicine Visit: The patient's condition can be safely assessed and treated via synchronous audio and visual telemedicine encounter.      Reason for Telemedicine Visit: Services only offered telehealth    Originating Site (Patient Location): Patient's home    Distant Site (Provider Location): Provider Remote Setting- Home Office    Consent:  The patient/guardian has verbally consented to: the potential risks and benefits of telemedicine (video visit) versus in person care; bill my insurance or make self-payment for services provided; and responsibility for payment of non-covered services.     Patient would like the video invitation sent by:  My Chart    Mode of Communication:  Video Conference via Medical Zoom    As the provider I attest to compliance with applicable laws and regulations related to telemedicine.          Summary of Group / Topics Discussed:  Specialty Topics: Trauma and PTSD: Patients were provided with information to understand the types and origins of trauma, the relationship between trauma and PTSD, the scope of Trauma-Informed Care, and treatment options. Patients were able to explore how trauma may have impacted their functioning directly or indirectly, with reference to the the complexity of trauma and associated treatment options. Patients reviewed their current awareness of this topic and relevance to their functioning. Individual experiences with symptoms and treatment options were discussed.     Patient Session Goals / Objectives:    Discussed definitions of  trauma, Trauma-Informed Care, PTSD    Discussed how traumatic experiences affect the individual and their relationships (directly, indirectly, brain development and function)    Identified how a history of trauma or exposure to trauma may impact group work    Assisted patients to find ways to adapt functioning in light of past traumatic experience(s), and to identify suitable treatment options and community resources      Patient Participation / Response:  Fully participated with the group by sharing personal reflections / insights and openly received / provided feedback with other participants.    Demonstrated understanding of topics discussed through group discussion and participation, Identified / Expressed readiness to act on skill suggestions discussed in topic and Verbalized understanding of ways to proactively manage illness    Treatment Plan:  Patient has a current master individualized treatment plan.  See Epic treatment plan for more information.    Liliana Olson, [facility]

## 2022-06-15 ENCOUNTER — HOSPITAL ENCOUNTER (OUTPATIENT)
Dept: BEHAVIORAL HEALTH | Facility: CLINIC | Age: 47
Discharge: HOME | End: 2022-06-15
Attending: PSYCHIATRY & NEUROLOGY
Payer: COMMERCIAL

## 2022-06-15 ENCOUNTER — TELEPHONE (OUTPATIENT)
Dept: BEHAVIORAL HEALTH | Facility: CLINIC | Age: 47
End: 2022-06-15
Payer: COMMERCIAL

## 2022-06-15 PROCEDURE — G0177 OPPS/PHP; TRAIN & EDUC SERV: HCPCS | Mod: GT,95

## 2022-06-15 PROCEDURE — 90853 GROUP PSYCHOTHERAPY: CPT | Mod: GT,95 | Performed by: COUNSELOR

## 2022-06-15 NOTE — GROUP NOTE
Psychoeducation Group Note    PATIENT'S NAME: Kandy Yañez  MRN:   [id number]  :   1975  [id number]  DATE OF SERVICE: 6/15/22  START TIME: 11:00 AM  END TIME: 11:50 AM  FACILITATOR: Virginia Lino OTR/L  TOPIC: MH Life Skills Group: Resiliency Development  [facility] Adult Dual Diagnosis Day Treatment  TRACK: ddp    NUMBER OF PARTICIPANTS: 4    Service Modality:  Video Visit     Telemedicine Visit: The patient's condition can be safely assessed and treated via synchronous audio and visual telemedicine encounter.      Reason for Telemedicine Visit: Services only offered telehealth    Originating Site (Patient Location): Patient's home    Distant Site (Provider Location): Provider Remote Setting- Home Office    Consent:  The patient/guardian has verbally consented to: the potential risks and benefits of telemedicine (video visit) versus in person care; bill my insurance or make self-payment for services provided; and responsibility for payment of non-covered services.     Patient would like the video invitation sent by:  My Chart    Mode of Communication:  Video Conference via Medical Zoom    As the provider I attest to compliance with applicable laws and regulations related to telemedicine.     Summary of Group / Topics Discussed:  Resiliency Development:  Self Esteem and Self Compassion: Positive Focus: Patients were given the opportunity to reflect and identified the positive aspects of challenges they have been through during their lifetime.  Patients were taught about the importance of self kindness on mental health wellbeing.  Patients were also given the opportunity to improve self-acceptance and work towards purpose through sharing inspiring stories of their lives to instill hope.      Patient Session Goals / Objectives:  ? Identified personal strengths and qualities about themselves as a way to provide hope and build self-compassion as a way to effectively manage symptoms    ? Improved awareness of resiliency and how this contributes to the process of recovery and mental health wellbeing and resiliency    ? Established a plan for practice of these skills in their own environments  ? Practiced and reflected on how to generalize taught skills to their everyday life      Patient Participation / Response:  Minimally participated, only when prompted / asked.    Patient presentation:  Kandy did not engage in discussion but appeared attentive.     Treatment Plan:  Patient has a current master individualized treatment plan.  See Epic treatment plan for more information.    Virginia Lino, OTR/L

## 2022-06-15 NOTE — GROUP NOTE
Process Group Note    PATIENT'S NAME: Kandy Yañez  MRN:   [id number]  :   1975  ACCT. NUMBER: [id number]  DATE OF SERVICE: 6/15/22  START TIME:  9:00 AM  END TIME:  9:50 AM  FACILITATOR: Liliana Olson [facility]  TOPIC:  Process Group    Diagnoses:  Bipolar I Disorder Current or Most Recent Episode Manic, Mild   300.02 (F41.1) Generalized Anxiety Disorder.  4. Other Diagnoses that is relevant to services:   Substance-Related & Addictive Disorders Stimulant Use Disorder: Specify current severity:  Severe  304.20 (F14.20)Amphetamine type substance, and Severe, Cocaine, Specify if: in remission, Tobacco Use Disorder, Specify current severity:  305.1(F17.200) Moderate, per history PTSD.       [facility] Adult Dual Diagnosis Day Treatment  TRACK: 1    NUMBER OF PARTICIPANTS: 5                                      Service Modality:  Video Visit     Telemedicine Visit: The patient's condition can be safely assessed and treated via synchronous audio and visual telemedicine encounter.      Reason for Telemedicine Visit: Services only offered telehealth    Originating Site (Patient Location): Patient's home    Distant Site (Provider Location): Provider Remote Setting- Home Office    Consent:  The patient/guardian has verbally consented to: the potential risks and benefits of telemedicine (video visit) versus in person care; bill my insurance or make self-payment for services provided; and responsibility for payment of non-covered services.     Patient would like the video invitation sent by:  My Chart    Mode of Communication:  Video Conference via Medical Zoom    As the provider I attest to compliance with applicable laws and regulations related to telemedicine.                Data:    Session content: At the start of this group, patients were invited to check in by identifying themselves, describing their current emotional status, and identifying issues to address in this group.   Area(s) of treatment focus  "addressed in this session included Symptom Management, Personal Safety and Abstinence/Relapse Prevention.    Kandy reported feeling \"tired\" because she was up late.  Her goal for the day is to start some laundry.  Client denied additional process time but was receptive to feedback from peers and provided support and encouragement from other group peers.       Therapeutic Interventions/Treatment Strategies:  Psychotherapist offered support, feedback and validation and reinforced use of skills.    Assessment:    Patient response:   Patient responded to session by accepting feedback, giving feedback and listening    Possible barriers to participation / learning include: and no barriers identified    Health Issues:   None reported       Substance Use Review:   Substance Use: Substance use has decreased    Mental Status/Behavioral Observations  Appearance:   Appropriate   Eye Contact:   Good   Psychomotor Behavior: Normal   Attitude:   Cooperative   Orientation:   All  Speech   Rate / Production: Normal    Volume:  Normal   Mood:    Depressed   Affect:    Appropriate   Thought Content:   Clear  Thought Form:  Coherent  Logical     Insight:    Good     Plan:     Safety Plan: No current safety concerns identified.  Recommended that patient call 911 or go to the local ED should there be a change in any of these risk factors.     Barriers to treatment: None identified    Patient Contracts (see media tab):  None    Substance Use: Provided encouragement towards sobriety    Provided support and affirmation for steps taken towards sobriety      Continue or Discharge: Patient will continue in Adult Day Treatment (ADT)  as planned. Patient is likely to benefit from learning and using skills as they work toward the goals identified in their treatment plan.      Liliana Olson, [facility]  Dorene 15, 2022    "

## 2022-06-15 NOTE — TELEPHONE ENCOUNTER
Called patient to offer assistance setting up a psychiatry apt (OP), pts mailbox is full. I see pt tomorrow in group, I will talk to them then.

## 2022-06-15 NOTE — GROUP NOTE
Psychotherapy Group Note    PATIENT'S NAME: Kandy Yañez  MRN:   [id number]  :   1975  [id number]  DATE OF SERVICE: 6/15/22  START TIME: 10:00 AM  END TIME: 10:50 AM  FACILITATOR: Liliana Olson LPCC  TOPIC:  EBP Group: DDP Relapse Prevention  [facility] Adult Dual Diagnosis Day Treatment  TRACK: 1    NUMBER OF PARTICIPANTS: 5                                      Service Modality:  Video Visit     Telemedicine Visit: The patient's condition can be safely assessed and treated via synchronous audio and visual telemedicine encounter.      Reason for Telemedicine Visit: Services only offered telehealth    Originating Site (Patient Location): Patient's home    Distant Site (Provider Location): Provider Remote Setting- Home Office    Consent:  The patient/guardian has verbally consented to: the potential risks and benefits of telemedicine (video visit) versus in person care; bill my insurance or make self-payment for services provided; and responsibility for payment of non-covered services.     Patient would like the video invitation sent by:  My Chart    Mode of Communication:  Video Conference via Medical Zoom    As the provider I attest to compliance with applicable laws and regulations related to telemedicine.          Summary of Group / Topics Discussed:  DDP Relapse Prevention: Stages of Change: Patients explored the process and types of change in relation to substance use, including but not limited to: theories of change, steps to making change, methods of changing behavior, and potential barriers to implementing change. Patients discussed their current and past experiences with managing change in relation to substance use and what stage of change they currently identify with. Patients identified what changes may benefit their daily lives and how they can work towards implementing change.    Patient Session Goals / Objectives:    Gained understanding of the change  process    Identified positive and negative behavioral patterns    Made plans to track and implement changes and shared experiences in group    Identified personal barriers to change      Patient Participation / Response:  Fully participated with the group by sharing personal reflections / insights and openly received / provided feedback with other participants.    Demonstrated understanding of topics discussed through group discussion and participation, Demonstrated understanding of utilizing relapse prevention skills to manage urges and maintain sobriety and Identified / Expressed personal readiness to utilize relapse prevention skills    Treatment Plan:  Patient has a current master individualized treatment plan.  See Epic treatment plan for more information.    Liliana Olson, [facility]

## 2022-06-16 DIAGNOSIS — F31.9 BIPOLAR DISORDER, UNSPECIFIED (H): Primary | ICD-10-CM

## 2022-06-16 RX ORDER — DIVALPROEX SODIUM 500 MG/1
TABLET, DELAYED RELEASE ORAL
Qty: 90 TABLET | Refills: 0 | Status: SHIPPED | OUTPATIENT
Start: 2022-06-16

## 2022-06-16 NOTE — TELEPHONE ENCOUNTER
Incoming refill request from [facility] for depakote 500mg, take 500mg am and 1000mg pm.    Refill was last sent on 6/9/2022 for 30 day supply but of 250mg. This is the wrong dose, dose has been updated to 500mg am, 1000mg pm.     Pt was mistaken and was taking 1000mg am and 1000mg pm. Pt agrees to start taking 500mg am and 1000mg pm. Verified with prescriber, pt should begin taking 500mg am, and 1000mg pm.     Pt will need to rx.    Completed this visit:  Depakote rx pended and medication dosing verfied with pt.  Transition clinic referral placed  Long-term psychiatry referral placed  Discussed getting Depakote lab draw in 1 week.

## 2022-06-17 ENCOUNTER — HOSPITAL ENCOUNTER (OUTPATIENT)
Dept: BEHAVIORAL HEALTH | Facility: CLINIC | Age: 47
Discharge: HOME | End: 2022-06-17
Attending: PSYCHIATRY & NEUROLOGY
Payer: COMMERCIAL

## 2022-06-17 DIAGNOSIS — F15.90 STIMULANT USE DISORDER: Primary | ICD-10-CM

## 2022-06-17 PROCEDURE — 90853 GROUP PSYCHOTHERAPY: CPT | Mod: GT,95

## 2022-06-17 NOTE — GROUP NOTE
Psychotherapy Group Note    PATIENT'S NAME: Kandy Yañez  MRN:   [id number]  :   1975  ACCT. NUMBER: [id number]  DATE OF SERVICE: 22  START TIME: 10:00 AM  END TIME: 10:30 AM  FACILITATOR: Antoinette Bowden LGSW  TOPIC: MH EBP Group: Mindfulness  [facility] Adult Dual Diagnosis Day Treatment  TRACK: DDP 1    NUMBER OF PARTICIPANTS: 6    Summary of Group / Topics Discussed:  Mindfulness: What is Mindfulness: Patients received an overview on what mindfulness is and how mindfulness can benefit general health, mental health symptoms, and stressors. The history of mindfulness, its application to mental health therapies, and key concepts were also discussed. Patients discussed current awareness, knowledge, and practice of mindfulness skills. Patients also discussed barriers to mindfulness practice.     Patient Session Goals / Objectives:    Demonstrated understanding of key concepts and application to daily life    Identified when/how to use mindfulness     Resolved barriers to practice    Identified plan to use mindfulness in daily life                                      Service Modality:  Video Visit     Telemedicine Visit: The patient's condition can be safely assessed and treated via synchronous audio and visual telemedicine encounter.      Reason for Telemedicine Visit: Services only offered telehealth    Originating Site (Patient Location): Patient's home    Distant Site (Provider Location): Provider Remote Setting- Home Office    Consent:  The patient/guardian has verbally consented to: the potential risks and benefits of telemedicine (video visit) versus in person care; bill my insurance or make self-payment for services provided; and responsibility for payment of non-covered services.     Patient would like the video invitation sent by:  My Chart    Mode of Communication:  Video Conference via Medical Zoom    As the provider I attest to compliance with applicable laws and regulations related to  telemedicine.            Patient Participation / Response:  Minimally participated, only when prompted / asked.    Demonstrated understanding of topics discussed through group discussion and participation    Treatment Plan:  Patient has a current master individualized treatment plan.  See Epic treatment plan for more information.    Antoinette Bowden LGSW

## 2022-06-17 NOTE — PROGRESS NOTES
Client joined the DDP1 group at 9:40am.    Client reported feeling pissed off and angry today.  She is frustrated that her mom and stepdad are treating her  like a child  after her recent relapse (i.e. needing to get permission to leave the house, get transportation, etc.). She coped with her anger by leaving a heated argument with her mom and stepdad this morning.   Her goal is to do a side job with a friend so she can get $25 gift certificates.  She noted that due to her relapse, she spent all of her money.  She reports being  done  with her mom and stepdad but stated that she was not worried about becoming homeless.  Triggers include her parents drinking in the house and an urge to use with a friend who got her the side job.  Writer engaged in weekend planning with client.  She was agreeable to reaching out to a friend in [place] to stay with over the weekend.  In addition to feeling criticized by her parents, she reported feeling  pushed off  by mental health providers.  She notices that she needs extra support when her period is coming.  Writer encouraged client to focus on what she could do to cope with tough emotions.  She endorsed using a period tracker to anticipate symptoms, mindfulness (stop and think before reacting), sleeping and reading bible passages.  Peers offered supportive feedback and validation. She is anxious about her  reaching out and wonders if her recent relapse will set her back.  Writer encouraged client to prioritize physical self-care this weekend and to consult with primary therapist when they return from vacation.    FERNANDA Crawford on 6/17/2022 at 11:18 AM

## 2022-06-20 ENCOUNTER — TELEPHONE (OUTPATIENT)
Dept: BEHAVIORAL HEALTH | Facility: CLINIC | Age: 47
End: 2022-06-20
Payer: COMMERCIAL

## 2022-06-20 DIAGNOSIS — F15.90 STIMULANT USE DISORDER: Primary | ICD-10-CM

## 2022-06-20 NOTE — TELEPHONE ENCOUNTER
Writer contacted pt and left voicemail inquiring about absence. Left message requesting return phone call.     FOREIGN Escamilla,[facility]  6/20/2022

## 2022-06-22 ENCOUNTER — TELEPHONE (OUTPATIENT)
Dept: BEHAVIORAL HEALTH | Facility: CLINIC | Age: 47
End: 2022-06-22

## 2022-06-22 NOTE — TELEPHONE ENCOUNTER
Writer called client to explain their absence from group today.  Writer left them a voicemail asking that they return writer's call. Writer explained that if she does not contact writer or attend group tomorrow, her EC will be called and she will likely be discharged.    Liliana Olson, [facility] on 6/22/2022 at 3:03 PM

## 2022-06-23 ENCOUNTER — TELEPHONE (OUTPATIENT)
Dept: BEHAVIORAL HEALTH | Facility: CLINIC | Age: 47
End: 2022-06-23

## 2022-06-23 NOTE — TELEPHONE ENCOUNTER
Writer left a  for Kandy's emergency contact (mother, Shwetha) and explained that Kandy hasn't been in group all week and is going to be discharged if she doesn't contact writer by the end of the day today.      Liliana Olson, [facility] on 6/23/2022 at 10:06 AM

## 2022-06-23 NOTE — TELEPHONE ENCOUNTER
Writer attempted to leave Kandy a VM explaining that she is being discharged but her voicemail box was full and writer was unable to leave a VM..    Liliana Olson, [facility] on 6/23/2022 at 3:53 PM

## 2022-06-23 NOTE — TELEPHONE ENCOUNTER
Writer spoke with Kandy's mother after reaching out to her due to Kandy's absences from group this week.  She explained that Kandy had been feeling sick due to a medication and left the house yesterday, saying she was having a friend take her to the hospital but her mom hasn't seen or heard from her since and her isn't answer the phone.  Writer explained to her mother that she is being discharged from [facility] and being referred to a higher level of care.    Liliana Olson [facility] on 6/23/2022 at 3:55 PM

## 2022-06-24 NOTE — DISCHARGE SUMMARY
Adult Dual Disorder Program   Discharge Summary/Instructions     Patient: Kandy Yañez MRN: [id number]  : 1975 Age:  46 year old Sex:  female    Admission Date: 22  Discharge Date: 22  Diagnosis:   Bipolar I Disorder Current or Most Recent Episode Manic, Mild   300.02 (F41.1) Generalized Anxiety Disorder.  4. Other Diagnoses that is relevant to services:   Substance-Related & Addictive Disorders Stimulant Use Disorder: Specify current severity:  Severe  304.20 (F14.20)Amphetamine type substance, and Severe, Cocaine, Specify if: in remission, Tobacco Use Disorder, Specify current severity:  305.1(F17.200) Moderate, per history PTSD.       Focus of Treatment / Discharge Recommendations: You are being discharged from the Dual Disorder IOP and recommendations are to seek out a higher level of care.  You can call [employer]'s One Access line to be re-assessed at [phone].      Personal Safety/ Management of Symptoms:    * Follow your safety plan.  Report increased symptoms to your care team                    and/or use the crisis resources listed below.    Crisis Resources:    Suicide Prevention Lifeline: 9-492-003-KEXY (4321)    Crisis Text Line Service (available 24 hours a day, 7 days a week): Text [place] to 511137    Call  **CRISIS (729558) from a cell phone to talk to a team of professionals who can help you.  Crisis Services By [facility]: Phone Number:   [place]     [phone]   [place]    [phone]   [place]    [phone]   [place]    [phone]   [place]    [phone]   [place] [phone]   [place]     [phone]       Call 911 or go to my nearest emergency department.     Managing Symptoms / Abstinence / Preventing Relapse:    Take all medicines as directed.  Carry a current list of medicines with you.    Use coping skills: mindfulness, distress tolerance, breathing, etc.    Do not use illicit (street) drugs, controlled substances (narcotics) or alcohol.    Go to all  appointments.    Report symptoms to your care team including: thoughts of suicide, loss of sleep, increased confusion, mood getting worse, feeling more aggressive, or substance use.    Develop/Improve Independent Living/Socialization Skills: Maintain structure/routine, take meds as prescribed, attend community support meetings, maintain healthy boundaries.     Community Resources/Supports and Discharge Planning:      Follow up with psychiatrist / main caregiver: N/A    Next visit: N/A    Follow up with your therapist: N/A   Next visit: N/A    Go to group therapy and / or support groups at: AA    See your medical doctor about:  Any physical health concerns    Other:  N/A    Copy of summary sent to: Sent to patient via [employer]      Client Signature:__unavailable to sign due to COVID-19_______________________________   Date / Time:___________    Staff Signature:___Liliana Olson [facility] on 6/24/2022 at 9:09 AM  _______________________________   Date / Time:___________

## 2022-07-18 ENCOUNTER — TELEPHONE (OUTPATIENT)
Dept: BEHAVIORAL HEALTH | Facility: CLINIC | Age: 47
End: 2022-07-18

## 2022-07-20 ENCOUNTER — TELEPHONE (OUTPATIENT)
Dept: BEHAVIORAL HEALTH | Facility: CLINIC | Age: 47
End: 2022-07-20

## 2022-07-20 NOTE — TELEPHONE ENCOUNTER
Tried reaching out to patient to check them in for their virtual Dual eval today, 7/20/2022 at 0800. Called, but no answer so a voice message was left for patient to return call to check in.

## 2022-08-17 ENCOUNTER — TRANSFERRED RECORDS (OUTPATIENT)
Dept: HEALTH INFORMATION MANAGEMENT | Facility: CLINIC | Age: 47
End: 2022-08-17

## 2022-11-21 ENCOUNTER — HEALTH MAINTENANCE LETTER (OUTPATIENT)
Age: 47
End: 2022-11-21

## 2023-02-06 ENCOUNTER — TELEPHONE (OUTPATIENT)
Dept: FAMILY MEDICINE | Facility: CLINIC | Age: 48
End: 2023-02-06
Payer: COMMERCIAL

## 2023-02-06 NOTE — LETTER
[facility]  [address] [place] [address] [phone]      March 8, 2023    To  Kandy Yañez  [address] [place] [address]    Your team at [facility] [place] cares about your health. We have reviewed your chart and based on our findings; we are making the following recommendations to better manage your health.     You are in particular need of attention regarding the following:     Call or MyChart message your clinic to schedule a colonoscopy, schedule/ a FIT Test, or order a Cologuard test. If you are unsure what type of test you need, please call your clinic and speak to clinic staff.   Colon cancer is now the second leading cause of cancer-related deaths in the [place] for both men and women and there are over 130,000 new cases and 50,000 deaths per year from colon cancer. Colonoscopies can prevent 90-95% of these deaths. Problem lesions can be removed before they ever become cancer. This test is not only looking for cancer, but also getting rid of precancerous lesions.   If you are under/uninsured, we recommend you contact the [employer] Program.[employer] is a free colorectal cancer screening program that provides colonoscopies for eligible under/uninsured [place] men and women. If you are interested in receiving a free colonoscopy, please call [employer] at t [phone] (mention code Sc[employer]Web) to see if you're eligible. Please have them send us the results.     If you have already completed these items, please contact the clinic via phone or   M[facility]hart so your care team can review and update your records. Thank you for   choosing [facility] for your healthcare needs. For any questions,   concerns, or to schedule an appointment please contact our clinic.    Healthy Regards,      Your [facility] [place] Care Team

## 2023-02-06 NOTE — TELEPHONE ENCOUNTER
Patient Quality Outreach    Patient is due for the following:   Colon Cancer Screening      Topic Date Due     COVID-19 Vaccine (1) Never done     Flu Vaccine (1) 09/01/2022       Next Steps:   Schedule a office visit for Colon Cancer Screening    Type of outreach:    Sent Proxible message.      Questions for provider review:    None     Melita Brown CMA  Chart routed to Care Team.

## 2023-03-08 NOTE — TELEPHONE ENCOUNTER
Patient Quality Outreach    Patient is due for the following:   Colon Cancer Screening    Next Steps:   Patient was scheduled for Colon Cancer Screening    Type of outreach:    Sent Sidense message.    Next Steps:  Reach out within 90 days via Letter.    Max number of attempts reached: Yes. Will try again in 90 days if patient still on fail list.    Questions for provider review:    None     Melita Brown CMA  Chart routed to Care Team.

## 2023-04-11 ENCOUNTER — OFFICE VISIT (OUTPATIENT)
Dept: URGENT CARE | Facility: URGENT CARE | Age: 48
End: 2023-04-11
Payer: COMMERCIAL

## 2023-04-11 VITALS
BODY MASS INDEX: 25.44 KG/M2 | DIASTOLIC BLOOD PRESSURE: 70 MMHG | WEIGHT: 143.6 LBS | TEMPERATURE: 98 F | HEART RATE: 77 BPM | SYSTOLIC BLOOD PRESSURE: 103 MMHG | RESPIRATION RATE: 16 BRPM | OXYGEN SATURATION: 96 %

## 2023-04-11 DIAGNOSIS — K08.89 TOOTH PAIN: ICD-10-CM

## 2023-04-11 DIAGNOSIS — H66.002 ACUTE SUPPURATIVE OTITIS MEDIA OF LEFT EAR WITHOUT SPONTANEOUS RUPTURE OF TYMPANIC MEMBRANE, RECURRENCE NOT SPECIFIED: ICD-10-CM

## 2023-04-11 DIAGNOSIS — J01.90 ACUTE SINUSITIS WITH SYMPTOMS > 10 DAYS: Primary | ICD-10-CM

## 2023-04-11 DIAGNOSIS — R05.1 ACUTE COUGH: ICD-10-CM

## 2023-04-11 PROCEDURE — 99214 OFFICE O/P EST MOD 30 MIN: CPT | Performed by: PHYSICIAN ASSISTANT

## 2023-04-11 RX ORDER — AMOXICILLIN 875 MG
875 TABLET ORAL 2 TIMES DAILY
Qty: 20 TABLET | Refills: 0 | Status: SHIPPED | OUTPATIENT
Start: 2023-04-11 | End: 2023-04-21

## 2023-04-11 NOTE — PROGRESS NOTES
ASSESSMENT:     ICD-10-CM    1. Acute sinusitis with symptoms > 10 days  J01.90 amoxicillin (AMOXIL) 875 MG tablet      2. Acute cough  R05.1 amoxicillin (AMOXIL) 875 MG tablet      3. Acute suppurative otitis media of left ear without spontaneous rupture of tympanic membrane, recurrence not specified  H66.002 amoxicillin (AMOXIL) 875 MG tablet      4. Tooth pain  K08.89 amoxicillin (AMOXIL) 875 MG tablet          PLAN: Acute sinusitis/left otitis media.  Amoxicillin.  See dentist for tooth pain.  I have discussed clinical findings with patient.  Side effects of medications discussed.  Symptomatic care is discussed.  I have discussed the possibility of  worsening symptoms and indication to RTC or go to the ER if they occur.  All questions are answered, patient indicates understanding of these issues and is in agreement with plan.   Patient care instructions are discussed/given at the end of visit.   Lots of rest and fluids.      Mona Sanders PA-C      SUBJECTIVE:    47-year-old female with sinus congestion, left bilateral back molar tooth pain, left ear pain for 7 to 10 days.    Allergies   Allergen Reactions     Abilify [Aripiprazole] Other (See Comments)     Decreased libido, low BS, tiredness       Past Medical History:   Diagnosis Date     Bipolar disorder (H)      Chronic back pain      S/P diskectomy     L4-5       divalproex sodium delayed-release (DEPAKOTE) 250 MG DR tablet, TAKE 1 TABLET IN THE MORNING AND 2 TABLETS AT BEDTIME  divalproex sodium delayed-release (DEPAKOTE) 500 MG DR tablet, Take 1 tablet (500mg) in the AM and take 2 tablets (1,000mg) at bedtime.  hydrOXYzine (VISTARIL) 50 MG capsule, Take 1-2 capsules ( mg) by mouth nightly as needed for other (insomnia)  melatonin 5 MG CAPS,   metroNIDAZOLE (METROGEL) 0.75 % vaginal gel, Place 1 applicator (5 g) vaginally daily  multivitamin w/minerals (THERA-VIT-M) tablet, Take 1 tablet by mouth daily  nicotine (NICODERM CQ) 21 MG/24HR  24 hr patch, Place 1 patch onto the skin every 24 hours  venlafaxine (EFFEXOR) 37.5 MG tablet, Take 1 tablet (37.5 mg) by mouth every morning    No current facility-administered medications on file prior to visit.      Social History     Tobacco Use     Smoking status: Former     Packs/day: 0.50     Years: 20.00     Pack years: 10.00     Types: Cigarettes     Start date: 12/28/1989     Smokeless tobacco: Never     Tobacco comments:     plans to start patches this weekend   Vaping Use     Vaping status: Not on file   Substance Use Topics     Alcohol use: No       ROS:  CONSTITUTIONAL: Negative for fatigue or fever.  EYES: Negative for eye problems.  ENT: As above.  RESP: As above.  CV: Negative for chest pains.  GI: Negative for vomiting.  MUSCULOSKELETAL:  Negative for significant muscle or joint pains.  NEUROLOGIC: Negative for headaches.  SKIN: Negative for rash.  PSYCH: Normal mentation for age.    OBJECTIVE:  /70 (BP Location: Left arm, Patient Position: Sitting, Cuff Size: Adult Regular)   Pulse 77   Temp 98  F (36.7  C) (Tympanic)   Resp 16   Wt 65.1 kg (143 lb 9.6 oz)   SpO2 96%   BMI 25.44 kg/m      GENERAL APPEARANCE: Healthy, alert and no distress.  EYES:Conjunctiva/sclera clear.  EARS: No cerumen.   Ear canals w/o erythema.  Right TM is translucent.  Left TM is very edematous and bulging.      NOSE/MOUTH: Nose without ulcers, erythema or lesions.  Tender left lower back molar.  SINUSES: Moderate  maxillary sinus tenderness.  THROAT: No erythema w/o tonsillar enlargement . No exudates.  NECK: Supple, nontender, no lymphadenopathy.  RESP: Lungs clear to auscultation - no rales, rhonchi or wheezes  CV: Regular rate and rhythm, normal S1 S2, no murmur noted.  NEURO: Awake, alert    SKIN: No rashes        Mona Sanders PA-C

## 2023-07-09 ENCOUNTER — HEALTH MAINTENANCE LETTER (OUTPATIENT)
Age: 48
End: 2023-07-09

## 2023-09-17 ENCOUNTER — HEALTH MAINTENANCE LETTER (OUTPATIENT)
Age: 48
End: 2023-09-17

## 2023-12-22 NOTE — GROUP NOTE
Psychoeducation Group Note    PATIENT'S NAME: Kandy Yañez  MRN:   [id number]  :   1975  [id number]  DATE OF SERVICE: 22  START TIME:  9:00 AM  END TIME:  9:50 AM  FACILITATOR: Virginia Lino OTR/L  TOPIC: MH Life Skills Group: Communication and Social Skills Development  [facility] Adult Dual Diagnosis Day Treatment    TRACK: DDP1    Service Modality:  Video Visit     Telemedicine Visit: The patient's condition can be safely assessed and treated via synchronous audio and visual telemedicine encounter.      Reason for Telemedicine Visit: Services only offered telehealth    Originating Site (Patient Location): Patient's home    Distant Site (Provider Location): Provider Remote Setting- Home Office    Consent:  The patient/guardian has verbally consented to: the potential risks and benefits of telemedicine (video visit) versus in person care; bill my insurance or make self-payment for services provided; and responsibility for payment of non-covered services.     Patient would like the video invitation sent by:  My Chart    Mode of Communication:  Video Conference via Medical Zoom    As the provider I attest to compliance with applicable laws and regulations related to telemedicine.     Summary of Group / Topics Discussed:  Communication and Social Skills Development: Social Supports: Interpersonal Styles: Patients were taught and gained awareness of differently types of interpersonal communication styles.  Patients were taught communication skills so they can communicate with others more effectively. All patients shared results of their assessments and identified behaviors that could be useful in relationships based on differences.     Patient Session Goals / Objectives:    Identified their own type of interpersonal style of communicating and how this influences how they communicate with other people       Improved awareness of important aspects of interpersonal styles of communication  and how this relates to mental health recovery        Established a plan for practice of these skills in their own environments    Practiced and reflected on how to generalize taught skills to their everyday life      Patient Participation / Response:  Fully participated with the group by sharing personal reflections / insights and openly received / provided feedback with other participants.    Patient presentation:   congruent, demonstrated understanding of topic through discussion and participation in activities. Pt shared she appreciates affirmation, touch, and quality time. She was able to give examples of how it would be helpful to understand other's preferences.     Treatment Plan:  Patient has a current master individualized treatment plan.  See Epic treatment plan for more information.    Virginia Lino, OTR/L       [id number]

## 2024-09-01 ENCOUNTER — HEALTH MAINTENANCE LETTER (OUTPATIENT)
Age: 49
End: 2024-09-01